# Patient Record
Sex: FEMALE | Race: BLACK OR AFRICAN AMERICAN | NOT HISPANIC OR LATINO
[De-identification: names, ages, dates, MRNs, and addresses within clinical notes are randomized per-mention and may not be internally consistent; named-entity substitution may affect disease eponyms.]

---

## 2017-01-10 ENCOUNTER — CHARTING TRANS (OUTPATIENT)
Dept: OTHER | Age: 52
End: 2017-01-10

## 2017-01-23 ENCOUNTER — CHARTING TRANS (OUTPATIENT)
Dept: INTERNAL MEDICINE | Age: 52
End: 2017-01-23

## 2017-01-23 ENCOUNTER — CHARTING TRANS (OUTPATIENT)
Dept: OTHER | Age: 52
End: 2017-01-23

## 2017-01-23 ENCOUNTER — IMAGING SERVICES (OUTPATIENT)
Dept: OTHER | Age: 52
End: 2017-01-23

## 2017-01-24 ENCOUNTER — LAB SERVICES (OUTPATIENT)
Dept: OTHER | Age: 52
End: 2017-01-24

## 2017-01-24 LAB
ALBUMIN SERPL BCG-MCNC: 4.2 G/DL (ref 3.6–5.1)
ALP SERPL-CCNC: 64 U/L (ref 45–105)
ALT SERPL W/O P-5'-P-CCNC: 15 U/L (ref 7–34)
AST SERPL-CCNC: 13 U/L (ref 9–37)
BILIRUB SERPL-MCNC: <0.2 MG/DL (ref 0–1)
BUN SERPL-MCNC: 21 MG/DL (ref 7–20)
CALCIUM SERPL-MCNC: 9.8 MG/DL (ref 8.6–10.6)
CHLORIDE SERPL-SCNC: 102 MMOL/L (ref 96–107)
CHOLEST SERPL-MCNC: 198 MG/DL (ref 140–200)
CREATININE, SERUM: 1.1 MG/DL (ref 0.5–1.4)
DIFFERENTIAL TYPE: ABNORMAL
GFR SERPL CREATININE-BSD FRML MDRD: 52 ML/MIN/{1.73M2}
GFR SERPL CREATININE-BSD FRML MDRD: >60 ML/MIN/{1.73M2}
GLUCOSE P FAST SERPL-MCNC: 102 MG/DL (ref 60–100)
HCO3 SERPL-SCNC: 23 MMOL/L (ref 22–32)
HDLC SERPL-MCNC: 77 MG/DL
HEMATOCRIT: 34.1 % (ref 34–45)
HEMOGLOBIN: 11.5 G/DL (ref 11.2–15.7)
LDLC SERPL CALC-MCNC: 110 MG/DL (ref 0–100)
LYMPH PERCENT: 26.9 % (ref 20.5–51.1)
LYMPHOCYTE ABSOLUTE #: 2.8 10*3/UL (ref 1.2–3.4)
MEAN CORPUSCULAR HGB CONCENTRATION: 33.7 % (ref 32–36)
MEAN CORPUSCULAR HGB: 29.7 PG (ref 27–34)
MEAN CORPUSCULAR VOLUME: 88.1 FL (ref 79–95)
MEAN PLATELET VOLUME: 7.8 FL (ref 8.6–12.4)
MIXED %: 7.4 % (ref 4.3–12.9)
MIXED ABSOLUTE #: 0.8 10*3/UL (ref 0.2–0.9)
NEUTROPHIL ABSOLUTE #: 6.8 10*3/UL (ref 1.4–6.5)
NEUTROPHIL PERCENT: 65.7 % (ref 34–73.5)
PLATELET COUNT: 426 10*3/UL (ref 150–400)
POTASSIUM SERPL-SCNC: 4.4 MMOL/L (ref 3.5–5.3)
PROT SERPL-MCNC: 6.8 G/DL (ref 6.2–8.1)
RED BLOOD CELL COUNT: 3.87 10*6/UL (ref 3.7–5.2)
RED CELL DISTRIBUTION WIDTH: 14 % (ref 11.3–14.8)
SODIUM SERPL-SCNC: 137 MMOL/L (ref 136–146)
TRIGL SERPL-MCNC: 54 MG/DL (ref 0–200)
TSH SERPL DL<=0.05 MIU/L-ACNC: 2.64 M[IU]/L (ref 0.3–4.82)
WHITE BLOOD CELL COUNT: 10.4 10*3/UL (ref 4–10)

## 2017-01-25 LAB
EST. AVERAGE GLUCOSE BLD GHB EST-MCNC: 126 MG/DL (ref 0–154)
HBA1C MFR BLD: 6 % (ref 4.2–6)

## 2017-01-26 ENCOUNTER — CHARTING TRANS (OUTPATIENT)
Dept: OTHER | Age: 52
End: 2017-01-26

## 2017-01-27 ENCOUNTER — CHARTING TRANS (OUTPATIENT)
Dept: OTHER | Age: 52
End: 2017-01-27

## 2017-01-27 ENCOUNTER — IMAGING SERVICES (OUTPATIENT)
Dept: OTHER | Age: 52
End: 2017-01-27

## 2017-02-03 ENCOUNTER — CHARTING TRANS (OUTPATIENT)
Dept: OTHER | Age: 52
End: 2017-02-03

## 2017-02-18 ENCOUNTER — IMAGING SERVICES (OUTPATIENT)
Dept: OTHER | Age: 52
End: 2017-02-18

## 2017-02-20 ENCOUNTER — IMAGING SERVICES (OUTPATIENT)
Dept: OTHER | Age: 52
End: 2017-02-20

## 2017-02-24 ENCOUNTER — IMAGING SERVICES (OUTPATIENT)
Dept: OTHER | Age: 52
End: 2017-02-24

## 2017-02-24 ENCOUNTER — CHARTING TRANS (OUTPATIENT)
Dept: OTHER | Age: 52
End: 2017-02-24

## 2017-03-20 ENCOUNTER — CHARTING TRANS (OUTPATIENT)
Dept: GASTROENTEROLOGY | Age: 52
End: 2017-03-20

## 2017-04-02 ENCOUNTER — CHARTING TRANS (OUTPATIENT)
Dept: URGENT CARE | Age: 52
End: 2017-04-02

## 2017-04-02 ASSESSMENT — PAIN SCALES - GENERAL: PAINLEVEL_OUTOF10: 6

## 2017-04-03 ENCOUNTER — CHARTING TRANS (OUTPATIENT)
Dept: OTHER | Age: 52
End: 2017-04-03

## 2017-04-10 ENCOUNTER — CHARTING TRANS (OUTPATIENT)
Dept: OTHER | Age: 52
End: 2017-04-10

## 2017-05-01 ENCOUNTER — CHARTING TRANS (OUTPATIENT)
Dept: OTHER | Age: 52
End: 2017-05-01

## 2017-05-01 ENCOUNTER — LAB SERVICES (OUTPATIENT)
Dept: OTHER | Age: 52
End: 2017-05-01

## 2017-05-02 LAB — PREGNANCY TEST, URINE: NEGATIVE

## 2017-05-03 ENCOUNTER — CHARTING TRANS (OUTPATIENT)
Dept: OTHER | Age: 52
End: 2017-05-03

## 2017-05-03 LAB — AP REPORT: NORMAL

## 2017-06-26 ENCOUNTER — CHARTING TRANS (OUTPATIENT)
Dept: OTHER | Age: 52
End: 2017-06-26

## 2017-06-28 ENCOUNTER — IMAGING SERVICES (OUTPATIENT)
Dept: OTHER | Age: 52
End: 2017-06-28

## 2017-06-28 ENCOUNTER — CHARTING TRANS (OUTPATIENT)
Dept: ORTHOPEDICS | Age: 52
End: 2017-06-28

## 2017-07-03 ENCOUNTER — CHARTING TRANS (OUTPATIENT)
Dept: OTHER | Age: 52
End: 2017-07-03

## 2017-07-05 ENCOUNTER — CHARTING TRANS (OUTPATIENT)
Dept: OTHER | Age: 52
End: 2017-07-05

## 2017-07-12 ENCOUNTER — MYAURORA ACCOUNT LINK (OUTPATIENT)
Dept: OTHER | Age: 52
End: 2017-07-12

## 2017-07-12 ENCOUNTER — CHARTING TRANS (OUTPATIENT)
Dept: GASTROENTEROLOGY | Age: 52
End: 2017-07-12

## 2017-07-17 ENCOUNTER — CHARTING TRANS (OUTPATIENT)
Dept: INTERNAL MEDICINE | Age: 52
End: 2017-07-17

## 2017-07-20 ENCOUNTER — CHARTING TRANS (OUTPATIENT)
Dept: OTHER | Age: 52
End: 2017-07-20

## 2017-08-25 ENCOUNTER — IMAGING SERVICES (OUTPATIENT)
Dept: OTHER | Age: 52
End: 2017-08-25

## 2017-09-15 ENCOUNTER — CHARTING TRANS (OUTPATIENT)
Dept: OTHER | Age: 52
End: 2017-09-15

## 2017-10-03 ENCOUNTER — CHARTING TRANS (OUTPATIENT)
Dept: OTHER | Age: 52
End: 2017-10-03

## 2017-11-08 ENCOUNTER — CHARTING TRANS (OUTPATIENT)
Dept: OTHER | Age: 52
End: 2017-11-08

## 2017-12-14 ENCOUNTER — CHARTING TRANS (OUTPATIENT)
Dept: OTHER | Age: 52
End: 2017-12-14

## 2017-12-19 ENCOUNTER — CHARTING TRANS (OUTPATIENT)
Dept: OTHER | Age: 52
End: 2017-12-19

## 2018-01-03 ENCOUNTER — CHARTING TRANS (OUTPATIENT)
Dept: ORTHOPEDICS | Age: 53
End: 2018-01-03

## 2018-01-11 ENCOUNTER — LAB SERVICES (OUTPATIENT)
Dept: OTHER | Age: 53
End: 2018-01-11

## 2018-01-11 LAB
ALBUMIN SERPL BCG-MCNC: 4.3 G/DL (ref 3.6–5.1)
ALP SERPL-CCNC: 61 U/L (ref 45–105)
ALT SERPL W/O P-5'-P-CCNC: 12 U/L (ref 7–34)
AST SERPL-CCNC: 14 U/L (ref 9–37)
BILIRUB SERPL-MCNC: 0.3 MG/DL (ref 0–1)
BUN SERPL-MCNC: 20 MG/DL (ref 7–20)
CALCIUM SERPL-MCNC: 9.7 MG/DL (ref 8.6–10.6)
CHLORIDE SERPL-SCNC: 102 MMOL/L (ref 96–107)
CHOLEST SERPL-MCNC: 240 MG/DL (ref 140–200)
CREATININE, SERUM: 1 MG/DL (ref 0.5–1.4)
DIFFERENTIAL TYPE: ABNORMAL
EST. AVERAGE GLUCOSE BLD GHB EST-MCNC: 120 MG/DL (ref 0–154)
GFR SERPL CREATININE-BSD FRML MDRD: 59 ML/MIN/{1.73M2}
GFR SERPL CREATININE-BSD FRML MDRD: >60 ML/MIN/{1.73M2}
GLUCOSE P FAST SERPL-MCNC: 87 MG/DL (ref 60–100)
HBA1C MFR BLD: 5.8 % (ref 4.2–6)
HCO3 SERPL-SCNC: 23 MMOL/L (ref 22–32)
HDLC SERPL-MCNC: 88 MG/DL
HEMATOCRIT: 37.8 % (ref 34–45)
HEMOGLOBIN: 12.5 G/DL (ref 11.2–15.7)
LDLC SERPL CALC-MCNC: 144 MG/DL (ref 0–100)
LYMPH PERCENT: 30.7 % (ref 20.5–51.1)
LYMPHOCYTE ABSOLUTE #: 3.2 10*3/UL (ref 1.2–3.4)
MEAN CORPUSCULAR HGB CONCENTRATION: 33.1 % (ref 32–36)
MEAN CORPUSCULAR HGB: 29.7 PG (ref 27–34)
MEAN CORPUSCULAR VOLUME: 89.8 FL (ref 79–95)
MEAN PLATELET VOLUME: 8 FL (ref 8.6–12.4)
MIXED %: 7 % (ref 4.3–12.9)
MIXED ABSOLUTE #: 0.7 10*3/UL (ref 0.2–0.9)
NEUTROPHIL ABSOLUTE #: 6.5 10*3/UL (ref 1.4–6.5)
NEUTROPHIL PERCENT: 62.3 % (ref 34–73.5)
PLATELET COUNT: 445 10*3/UL (ref 150–400)
POTASSIUM SERPL-SCNC: 4.7 MMOL/L (ref 3.5–5.3)
PROT SERPL-MCNC: 7 G/DL (ref 6.2–8.1)
RED BLOOD CELL COUNT: 4.21 10*6/UL (ref 3.7–5.2)
RED CELL DISTRIBUTION WIDTH: 14 % (ref 11.3–14.8)
SODIUM SERPL-SCNC: 137 MMOL/L (ref 136–146)
TRIGL SERPL-MCNC: 43 MG/DL (ref 0–200)
TSH SERPL DL<=0.05 MIU/L-ACNC: 1.85 M[IU]/L (ref 0.3–4.82)
WHITE BLOOD CELL COUNT: 10.4 10*3/UL (ref 4–10)

## 2018-01-23 ENCOUNTER — CHARTING TRANS (OUTPATIENT)
Dept: OTHER | Age: 53
End: 2018-01-23

## 2018-01-23 ENCOUNTER — LAB SERVICES (OUTPATIENT)
Dept: OTHER | Age: 53
End: 2018-01-23

## 2018-01-30 LAB — AP REPORT: NORMAL

## 2018-02-15 ENCOUNTER — CHARTING TRANS (OUTPATIENT)
Dept: OTHER | Age: 53
End: 2018-02-15

## 2018-02-23 ENCOUNTER — IMAGING SERVICES (OUTPATIENT)
Dept: OTHER | Age: 53
End: 2018-02-23

## 2018-03-12 ENCOUNTER — CHARTING TRANS (OUTPATIENT)
Dept: OTHER | Age: 53
End: 2018-03-12

## 2018-03-12 ENCOUNTER — MYAURORA ACCOUNT LINK (OUTPATIENT)
Dept: OTHER | Age: 53
End: 2018-03-12

## 2018-03-12 ASSESSMENT — PAIN SCALES - GENERAL: PAINLEVEL_OUTOF10: 7

## 2018-03-16 ENCOUNTER — MYAURORA ACCOUNT LINK (OUTPATIENT)
Dept: OTHER | Age: 53
End: 2018-03-16

## 2018-03-16 ENCOUNTER — CHARTING TRANS (OUTPATIENT)
Dept: OTHER | Age: 53
End: 2018-03-16

## 2018-03-19 ENCOUNTER — CHARTING TRANS (OUTPATIENT)
Dept: OTHER | Age: 53
End: 2018-03-19

## 2018-03-27 ENCOUNTER — CHARTING TRANS (OUTPATIENT)
Dept: OTHER | Age: 53
End: 2018-03-27

## 2018-03-27 ENCOUNTER — MYAURORA ACCOUNT LINK (OUTPATIENT)
Dept: OTHER | Age: 53
End: 2018-03-27

## 2018-03-27 ASSESSMENT — PAIN SCALES - GENERAL: PAINLEVEL_OUTOF10: 5

## 2018-03-28 ENCOUNTER — CHARTING TRANS (OUTPATIENT)
Dept: OTHER | Age: 53
End: 2018-03-28

## 2018-04-26 ENCOUNTER — CHARTING TRANS (OUTPATIENT)
Dept: OTHER | Age: 53
End: 2018-04-26

## 2018-06-13 ENCOUNTER — CHARTING TRANS (OUTPATIENT)
Dept: OTHER | Age: 53
End: 2018-06-13

## 2018-06-27 ENCOUNTER — CHARTING TRANS (OUTPATIENT)
Dept: OTHER | Age: 53
End: 2018-06-27

## 2018-07-30 ENCOUNTER — MYAURORA ACCOUNT LINK (OUTPATIENT)
Dept: OTHER | Age: 53
End: 2018-07-30

## 2018-08-20 ENCOUNTER — CHARTING TRANS (OUTPATIENT)
Dept: OTHER | Age: 53
End: 2018-08-20

## 2018-09-04 ENCOUNTER — MYAURORA ACCOUNT LINK (OUTPATIENT)
Dept: OTHER | Age: 53
End: 2018-09-04

## 2018-09-04 ENCOUNTER — IMAGING SERVICES (OUTPATIENT)
Dept: OTHER | Age: 53
End: 2018-09-04

## 2018-10-01 ENCOUNTER — CHARTING TRANS (OUTPATIENT)
Dept: OTHER | Age: 53
End: 2018-10-01

## 2018-10-04 ENCOUNTER — LAB SERVICES (OUTPATIENT)
Dept: OTHER | Age: 53
End: 2018-10-04

## 2018-10-04 ENCOUNTER — ANCILLARY ORDERS (OUTPATIENT)
Dept: OTHER | Age: 53
End: 2018-10-04

## 2018-10-04 ENCOUNTER — CHARTING TRANS (OUTPATIENT)
Dept: OTHER | Age: 53
End: 2018-10-04

## 2018-10-04 ENCOUNTER — MYAURORA ACCOUNT LINK (OUTPATIENT)
Dept: OTHER | Age: 53
End: 2018-10-04

## 2018-10-04 DIAGNOSIS — D21.9 BENIGN NEOPLASM OF CONNECTIVE AND OTHER SOFT TISSUE, UNSPECIFIED: ICD-10-CM

## 2018-10-04 LAB
BASOPHIL %: 0.4 % (ref 0–1.2)
BASOPHIL ABSOLUTE #: 0 10*3/UL (ref 0–0.1)
DIFFERENTIAL TYPE: ABNORMAL
EOSINOPHIL %: 2.9 % (ref 0–10)
EOSINOPHIL ABSOLUTE #: 0.3 10*3/UL (ref 0–0.5)
HEMATOCRIT: 38.9 % (ref 34–45)
HEMOGLOBIN: 12.9 G/DL (ref 11.2–15.7)
LYMPH PERCENT: 29.8 % (ref 20.5–51.1)
LYMPHOCYTE ABSOLUTE #: 2.7 10*3/UL (ref 1.2–3.4)
MEAN CORPUSCULAR HGB CONCENTRATION: 33.2 % (ref 32–36)
MEAN CORPUSCULAR HGB: 30.1 PG (ref 27–34)
MEAN CORPUSCULAR VOLUME: 90.7 FL (ref 79–95)
MEAN PLATELET VOLUME: 8.8 FL (ref 8.6–12.4)
MONOCYTE ABSOLUTE #: 0.7 10*3/UL (ref 0.2–0.9)
MONOCYTE PERCENT: 7.5 % (ref 4.3–12.9)
NEUTROPHIL ABSOLUTE #: 5.4 10*3/UL (ref 1.4–6.5)
NEUTROPHIL PERCENT: 59.4 % (ref 34–73.5)
PLATELET COUNT: 441 10*3/UL (ref 150–400)
RED BLOOD CELL COUNT: 4.29 10*6/UL (ref 3.7–5.2)
RED CELL DISTRIBUTION WIDTH: 13 % (ref 11.3–14.8)
WHITE BLOOD CELL COUNT: 9.2 10*3/UL (ref 4–10)

## 2018-10-05 LAB — FSH SERPL-ACNC: 7.82 M[IU]/ML (ref 2–25)

## 2018-10-06 ENCOUNTER — CHARTING TRANS (OUTPATIENT)
Dept: OTHER | Age: 53
End: 2018-10-06

## 2018-11-02 ENCOUNTER — CHARTING TRANS (OUTPATIENT)
Dept: OTHER | Age: 53
End: 2018-11-02

## 2018-11-12 ENCOUNTER — CHARTING TRANS (OUTPATIENT)
Dept: OTHER | Age: 53
End: 2018-11-12

## 2018-11-13 ENCOUNTER — CHARTING TRANS (OUTPATIENT)
Dept: OTHER | Age: 53
End: 2018-11-13

## 2018-11-17 ENCOUNTER — CHARTING TRANS (OUTPATIENT)
Dept: OTHER | Age: 53
End: 2018-11-17

## 2018-11-27 VITALS — WEIGHT: 280 LBS | BODY MASS INDEX: 45 KG/M2 | HEIGHT: 66 IN

## 2018-11-28 VITALS
DIASTOLIC BLOOD PRESSURE: 80 MMHG | RESPIRATION RATE: 22 BRPM | OXYGEN SATURATION: 97 % | TEMPERATURE: 97.3 F | SYSTOLIC BLOOD PRESSURE: 120 MMHG | HEART RATE: 57 BPM

## 2018-11-28 VITALS
DIASTOLIC BLOOD PRESSURE: 66 MMHG | WEIGHT: 293 LBS | BODY MASS INDEX: 47.09 KG/M2 | HEART RATE: 64 BPM | HEIGHT: 66 IN | SYSTOLIC BLOOD PRESSURE: 106 MMHG

## 2018-11-28 VITALS — WEIGHT: 293 LBS | BODY MASS INDEX: 47.09 KG/M2 | HEIGHT: 66 IN

## 2018-11-28 VITALS
BODY MASS INDEX: 46.93 KG/M2 | HEIGHT: 66 IN | TEMPERATURE: 97.3 F | SYSTOLIC BLOOD PRESSURE: 112 MMHG | RESPIRATION RATE: 16 BRPM | HEART RATE: 84 BPM | DIASTOLIC BLOOD PRESSURE: 62 MMHG | WEIGHT: 292 LBS

## 2018-11-29 VITALS
BODY MASS INDEX: 45.99 KG/M2 | HEIGHT: 67 IN | HEART RATE: 99 BPM | SYSTOLIC BLOOD PRESSURE: 138 MMHG | TEMPERATURE: 97 F | DIASTOLIC BLOOD PRESSURE: 86 MMHG | WEIGHT: 293 LBS | OXYGEN SATURATION: 92 %

## 2018-11-29 VITALS
HEIGHT: 67 IN | RESPIRATION RATE: 14 BRPM | WEIGHT: 293 LBS | DIASTOLIC BLOOD PRESSURE: 92 MMHG | BODY MASS INDEX: 45.99 KG/M2 | HEART RATE: 60 BPM | SYSTOLIC BLOOD PRESSURE: 116 MMHG

## 2018-12-10 ENCOUNTER — OFFICE VISIT (OUTPATIENT)
Dept: ORTHOPEDICS | Age: 53
End: 2018-12-10

## 2018-12-10 VITALS — WEIGHT: 270 LBS | HEIGHT: 66 IN | BODY MASS INDEX: 43.39 KG/M2

## 2018-12-10 DIAGNOSIS — M17.11 PRIMARY OSTEOARTHRITIS OF RIGHT KNEE: Primary | ICD-10-CM

## 2018-12-10 PROCEDURE — 20610 DRAIN/INJ JOINT/BURSA W/O US: CPT

## 2018-12-10 RX ORDER — CHLORAL HYDRATE 500 MG
CAPSULE ORAL
COMMUNITY
End: 2022-09-07 | Stop reason: ALTCHOICE

## 2018-12-10 RX ORDER — METHYLPREDNISOLONE ACETATE 40 MG/ML
40 INJECTION, SUSPENSION INTRA-ARTICULAR; INTRALESIONAL; INTRAMUSCULAR; SOFT TISSUE
Status: COMPLETED | OUTPATIENT
Start: 2018-12-10 | End: 2018-12-10

## 2018-12-10 RX ORDER — ALBUTEROL SULFATE 90 UG/1
2 AEROSOL, METERED RESPIRATORY (INHALATION)
COMMUNITY
Start: 2016-03-08 | End: 2019-10-25 | Stop reason: ALTCHOICE

## 2018-12-10 RX ORDER — LISINOPRIL 20 MG/1
TABLET ORAL
COMMUNITY
Start: 2018-11-12 | End: 2019-11-10 | Stop reason: SDUPTHER

## 2018-12-10 RX ORDER — FLUTICASONE PROPIONATE 50 MCG
1 SPRAY, SUSPENSION (ML) NASAL
COMMUNITY
Start: 2016-03-08 | End: 2019-10-25 | Stop reason: ALTCHOICE

## 2018-12-10 RX ORDER — BUPROPION HYDROCHLORIDE 150 MG/1
150 TABLET ORAL
COMMUNITY
Start: 2018-09-04 | End: 2019-02-19 | Stop reason: SDUPTHER

## 2018-12-10 RX ORDER — NABUMETONE 500 MG/1
TABLET, FILM COATED ORAL
COMMUNITY
Start: 2018-09-14 | End: 2019-03-12 | Stop reason: SDUPTHER

## 2018-12-10 RX ORDER — BUPROPION HYDROCHLORIDE 300 MG/1
300 TABLET ORAL
COMMUNITY
Start: 2018-09-04 | End: 2019-02-19 | Stop reason: SDUPTHER

## 2018-12-10 RX ORDER — MONTELUKAST SODIUM 10 MG/1
10 TABLET ORAL
COMMUNITY
Start: 2016-03-08 | End: 2020-04-24 | Stop reason: ALTCHOICE

## 2018-12-10 RX ORDER — BUTALBITAL, ACETAMINOPHEN AND CAFFEINE 50; 325; 40 MG/1; MG/1; MG/1
TABLET ORAL
COMMUNITY
Start: 2018-10-06 | End: 2019-03-11 | Stop reason: SDUPTHER

## 2018-12-10 RX ADMIN — METHYLPREDNISOLONE ACETATE 40 MG: 40 INJECTION, SUSPENSION INTRA-ARTICULAR; INTRALESIONAL; INTRAMUSCULAR; SOFT TISSUE at 15:14

## 2018-12-29 RX ORDER — BUTALBITAL, ACETAMINOPHEN AND CAFFEINE 50; 325; 40 MG/1; MG/1; MG/1
TABLET ORAL
Qty: 30 TABLET | Refills: 0 | Status: SHIPPED | OUTPATIENT
Start: 2018-12-29 | End: 2019-01-28 | Stop reason: SDUPTHER

## 2019-01-04 RX ORDER — BUTALBITAL, ACETAMINOPHEN AND CAFFEINE 50; 325; 40 MG/1; MG/1; MG/1
TABLET ORAL
Qty: 30 TABLET | Refills: 0 | OUTPATIENT
Start: 2019-01-04

## 2019-01-21 RX ORDER — PANTOPRAZOLE SODIUM 40 MG/1
TABLET, DELAYED RELEASE ORAL
COMMUNITY
Start: 2018-06-27 | End: 2019-01-28 | Stop reason: SDUPTHER

## 2019-01-28 ENCOUNTER — OFFICE VISIT (OUTPATIENT)
Dept: INTERNAL MEDICINE | Age: 54
End: 2019-01-28

## 2019-01-28 ENCOUNTER — OFFICE VISIT (OUTPATIENT)
Dept: ORTHOPEDICS | Age: 54
End: 2019-01-28

## 2019-01-28 VITALS
DIASTOLIC BLOOD PRESSURE: 68 MMHG | OXYGEN SATURATION: 99 % | BODY MASS INDEX: 42.59 KG/M2 | SYSTOLIC BLOOD PRESSURE: 102 MMHG | HEART RATE: 75 BPM | TEMPERATURE: 96.9 F | WEIGHT: 265 LBS | HEIGHT: 66 IN

## 2019-01-28 DIAGNOSIS — M17.12 PRIMARY OSTEOARTHRITIS OF LEFT KNEE: Primary | ICD-10-CM

## 2019-01-28 DIAGNOSIS — Z00.00 ROUTINE GENERAL MEDICAL EXAMINATION AT HEALTH CARE FACILITY: Primary | ICD-10-CM

## 2019-01-28 DIAGNOSIS — Z12.31 ENCOUNTER FOR SCREENING MAMMOGRAM FOR MALIGNANT NEOPLASM OF BREAST: ICD-10-CM

## 2019-01-28 PROCEDURE — 3074F SYST BP LT 130 MM HG: CPT | Performed by: INTERNAL MEDICINE

## 2019-01-28 PROCEDURE — 80061 LIPID PANEL: CPT | Performed by: INTERNAL MEDICINE

## 2019-01-28 PROCEDURE — 80050 GENERAL HEALTH PANEL: CPT | Performed by: INTERNAL MEDICINE

## 2019-01-28 PROCEDURE — 20610 DRAIN/INJ JOINT/BURSA W/O US: CPT | Performed by: PHYSICIAN ASSISTANT

## 2019-01-28 PROCEDURE — 99396 PREV VISIT EST AGE 40-64: CPT | Performed by: INTERNAL MEDICINE

## 2019-01-28 PROCEDURE — 3078F DIAST BP <80 MM HG: CPT | Performed by: INTERNAL MEDICINE

## 2019-01-28 RX ORDER — PANTOPRAZOLE SODIUM 40 MG/1
40 TABLET, DELAYED RELEASE ORAL DAILY
Qty: 90 TABLET | Refills: 0 | Status: SHIPPED | OUTPATIENT
Start: 2019-01-28 | End: 2019-04-26 | Stop reason: SDUPTHER

## 2019-01-28 RX ORDER — PHENDIMETRAZINE TARTRATE 35 MG/1
TABLET ORAL
Refills: 0 | COMMUNITY
Start: 2019-01-17 | End: 2019-10-25 | Stop reason: ALTCHOICE

## 2019-01-28 RX ORDER — METHYLPREDNISOLONE ACETATE 40 MG/ML
80 INJECTION, SUSPENSION INTRA-ARTICULAR; INTRALESIONAL; INTRAMUSCULAR; SOFT TISSUE
Status: COMPLETED | OUTPATIENT
Start: 2019-01-28 | End: 2019-01-28

## 2019-01-28 RX ADMIN — METHYLPREDNISOLONE ACETATE 80 MG: 40 INJECTION, SUSPENSION INTRA-ARTICULAR; INTRALESIONAL; INTRAMUSCULAR; SOFT TISSUE at 14:05

## 2019-01-28 SDOH — HEALTH STABILITY: PHYSICAL HEALTH: ON AVERAGE, HOW MANY MINUTES DO YOU ENGAGE IN EXERCISE AT THIS LEVEL?: 30 MIN

## 2019-01-28 SDOH — HEALTH STABILITY: MENTAL HEALTH
STRESS IS WHEN SOMEONE FEELS TENSE, NERVOUS, ANXIOUS, OR CAN'T SLEEP AT NIGHT BECAUSE THEIR MIND IS TROUBLED. HOW STRESSED ARE YOU?: NOT AT ALL

## 2019-01-28 SDOH — HEALTH STABILITY: PHYSICAL HEALTH: ON AVERAGE, HOW MANY DAYS PER WEEK DO YOU ENGAGE IN MODERATE TO STRENUOUS EXERCISE (LIKE A BRISK WALK)?: 5 DAYS

## 2019-01-28 ASSESSMENT — PATIENT HEALTH QUESTIONNAIRE - PHQ9
2. FEELING DOWN, DEPRESSED OR HOPELESS: NOT AT ALL
1. LITTLE INTEREST OR PLEASURE IN DOING THINGS: NOT AT ALL
SUM OF ALL RESPONSES TO PHQ9 QUESTIONS 1 AND 2: 0

## 2019-01-29 ENCOUNTER — LAB SERVICES (OUTPATIENT)
Dept: LAB | Age: 54
End: 2019-01-29

## 2019-01-29 DIAGNOSIS — Z00.00 ROUTINE GENERAL MEDICAL EXAMINATION AT HEALTH CARE FACILITY: ICD-10-CM

## 2019-01-29 LAB
ALBUMIN SERPL BCG-MCNC: 4.4 G/DL (ref 3.6–5.1)
ALP SERPL-CCNC: 65 U/L (ref 45–130)
ALT SERPL W/O P-5'-P-CCNC: 20 U/L (ref 7–34)
AST SERPL-CCNC: 21 U/L (ref 9–37)
BILIRUB SERPL-MCNC: 0.2 MG/DL (ref 0–1)
BUN SERPL-MCNC: 23 MG/DL (ref 7–20)
CALCIUM SERPL-MCNC: 10 MG/DL (ref 8.6–10.6)
CHLORIDE SERPL-SCNC: 104 MMOL/L (ref 96–107)
CHOLEST SERPL-MCNC: 222 MG/DL (ref 140–200)
CREAT SERPL-MCNC: 1.1 MG/DL (ref 0.5–1.4)
DIFFERENTIAL TYPE: ABNORMAL
GFR SERPL CREATININE-BSD FRML MDRD: 54 ML/MIN/{1.73M2}
GFR SERPL CREATININE-BSD FRML MDRD: >60 ML/MIN/{1.73M2}
GLUCOSE P FAST SERPL-MCNC: 85 MG/DL (ref 60–100)
HCO3 SERPL-SCNC: 23 MMOL/L (ref 22–32)
HDLC SERPL-MCNC: 78 MG/DL
HEMATOCRIT: 40.6 % (ref 34–45)
HEMOGLOBIN: 13.3 G/DL (ref 11.2–15.7)
LDLC SERPL CALC-MCNC: 135 MG/DL (ref 0–100)
LYMPH PERCENT: 30.2 % (ref 20.5–51.1)
LYMPHOCYTE ABSOLUTE #: 2.5 10*3/UL (ref 1.2–3.4)
MEAN CORPUSCULAR HGB CONCENTRATION: 32.8 % (ref 32–36)
MEAN CORPUSCULAR HGB: 30.4 PG (ref 27–34)
MEAN CORPUSCULAR VOLUME: 92.7 FL (ref 79–95)
MEAN PLATELET VOLUME: 8.4 FL (ref 8.6–12.4)
MIXED %: 5.8 % (ref 4.3–12.9)
MIXED ABSOLUTE #: 0.5 10*3/UL (ref 0.2–0.9)
NEUTROPHIL ABSOLUTE #: 5.4 10*3/UL (ref 1.4–6.5)
NEUTROPHIL PERCENT: 64 % (ref 34–73.5)
PLATELET COUNT: 389 10*3/UL (ref 150–400)
POTASSIUM SERPL-SCNC: 4.7 MMOL/L (ref 3.5–5.3)
PROT SERPL-MCNC: 7.3 G/DL (ref 6.2–8.1)
RED BLOOD CELL COUNT: 4.38 10*6/UL (ref 3.7–5.2)
RED CELL DISTRIBUTION WIDTH: 14 % (ref 11.3–14.8)
SODIUM SERPL-SCNC: 138 MMOL/L (ref 136–146)
TRIGL SERPL-MCNC: 44 MG/DL (ref 0–200)
TSH SERPL DL<=0.05 MIU/L-ACNC: 2.08 M[IU]/L (ref 0.3–4.82)
WHITE BLOOD CELL COUNT: 8.4 10*3/UL (ref 4–10)

## 2019-01-29 PROCEDURE — 80050 GENERAL HEALTH PANEL: CPT | Performed by: INTERNAL MEDICINE

## 2019-01-29 PROCEDURE — 36415 COLL VENOUS BLD VENIPUNCTURE: CPT | Performed by: INTERNAL MEDICINE

## 2019-01-29 PROCEDURE — 80061 LIPID PANEL: CPT | Performed by: INTERNAL MEDICINE

## 2019-02-20 RX ORDER — BUPROPION HYDROCHLORIDE 300 MG/1
TABLET ORAL
Qty: 90 TABLET | Refills: 1 | Status: SHIPPED | OUTPATIENT
Start: 2019-02-20 | End: 2019-08-21 | Stop reason: SDUPTHER

## 2019-02-20 RX ORDER — NABUMETONE 500 MG/1
TABLET, FILM COATED ORAL
Qty: 30 TABLET | Refills: 2 | OUTPATIENT
Start: 2019-02-20

## 2019-02-20 RX ORDER — BUPROPION HYDROCHLORIDE 150 MG/1
TABLET ORAL
Qty: 90 TABLET | Refills: 1 | Status: SHIPPED | OUTPATIENT
Start: 2019-02-20 | End: 2019-08-21 | Stop reason: SDUPTHER

## 2019-02-25 ENCOUNTER — TELEPHONE (OUTPATIENT)
Dept: SCHEDULING | Age: 54
End: 2019-02-25

## 2019-03-05 VITALS
SYSTOLIC BLOOD PRESSURE: 118 MMHG | DIASTOLIC BLOOD PRESSURE: 78 MMHG | WEIGHT: 292 LBS | HEIGHT: 67 IN | BODY MASS INDEX: 45.83 KG/M2

## 2019-03-06 VITALS
TEMPERATURE: 98.5 F | HEART RATE: 74 BPM | SYSTOLIC BLOOD PRESSURE: 122 MMHG | RESPIRATION RATE: 16 BRPM | OXYGEN SATURATION: 96 % | DIASTOLIC BLOOD PRESSURE: 84 MMHG

## 2019-03-06 VITALS
HEIGHT: 66 IN | DIASTOLIC BLOOD PRESSURE: 74 MMHG | SYSTOLIC BLOOD PRESSURE: 118 MMHG | TEMPERATURE: 98.7 F | WEIGHT: 274 LBS | BODY MASS INDEX: 44.03 KG/M2 | HEART RATE: 68 BPM

## 2019-03-06 VITALS
HEART RATE: 66 BPM | TEMPERATURE: 97 F | RESPIRATION RATE: 18 BRPM | OXYGEN SATURATION: 99 % | SYSTOLIC BLOOD PRESSURE: 136 MMHG | DIASTOLIC BLOOD PRESSURE: 90 MMHG

## 2019-03-11 RX ORDER — BUTALBITAL, ACETAMINOPHEN AND CAFFEINE 50; 325; 40 MG/1; MG/1; MG/1
TABLET ORAL
Qty: 30 TABLET | Refills: 0 | Status: SHIPPED | OUTPATIENT
Start: 2019-03-11 | End: 2019-05-11 | Stop reason: SDUPTHER

## 2019-03-11 RX ORDER — NABUMETONE 500 MG/1
TABLET, FILM COATED ORAL
Qty: 30 TABLET | Refills: 2 | OUTPATIENT
Start: 2019-03-11

## 2019-03-12 RX ORDER — NABUMETONE 500 MG/1
TABLET, FILM COATED ORAL
Qty: 30 TABLET | Refills: 0 | Status: SHIPPED | OUTPATIENT
Start: 2019-03-12 | End: 2019-10-25 | Stop reason: ALTCHOICE

## 2019-04-11 ENCOUNTER — TELEPHONE (OUTPATIENT)
Dept: ORTHOPEDICS | Age: 54
End: 2019-04-11

## 2019-05-01 RX ORDER — PANTOPRAZOLE SODIUM 40 MG/1
TABLET, DELAYED RELEASE ORAL
Qty: 90 TABLET | Refills: 1 | Status: SHIPPED | OUTPATIENT
Start: 2019-05-01 | End: 2019-11-20 | Stop reason: SDUPTHER

## 2019-05-14 RX ORDER — BUTALBITAL, ACETAMINOPHEN AND CAFFEINE 50; 325; 40 MG/1; MG/1; MG/1
TABLET ORAL
Qty: 30 TABLET | Refills: 0 | Status: SHIPPED | OUTPATIENT
Start: 2019-05-14 | End: 2019-08-19 | Stop reason: SDUPTHER

## 2019-05-20 NOTE — PROGRESS NOTES
HISTORY OF PRESENT ILLNESS  Patient presents with:  Weight Problem: referred by Dr Matt Carrizales  is currently taking phendimetrizine 35mg bid throught medi weight loss, Dr Lenise [facility]      Kirstin Osborn is a 47year old f negative  Migraines: +with aura, 1-3x/month  Seizures: negative  Joint-related conditions: OA bilateral knees  Liver disease: negative  Renal disease: negative  Diabetes: negative  Thyroid disease: small thyroid nodules  Constipation: negative  Other perti HGB, HCT, MCV, MCH, MCHC, RDW, PLT, MPV  No results found for: GLU, BUN, BUNCREA, CREATSERUM, ANIONGAP, GFR, GFRNAA, GFRAA, CA, OSMOCALC, ALKPHO, AST, ALT, ALKPHOS, BILT, TP, ALB, GLOBULT, GLOBULIN, AGRATIO, ALBGLOBRAT, NA, K, CL, CO2  No results found for LEPTIN, SERUM; Future  -     VITAMIN B12; Future  -     VITAMIN D, 25-HYDROXY; Future    Morbid obesity with BMI of 40.0-44.9, adult (HCC)  - no medication changes at this time, pt going to consider options.  If decides to [facility] with [facility]...your Lifestyle Renovation begins now! Thank you for placing your trust in our health care team, I look forward to working with you along this journey to better health!     Next steps:     1.  Schedule a personal nutrition consultation with one of the start to mindful eating! Continue or start exercising to help establish a routine. If not already exercising begin with 1 day and progress as able with long-term goal of 30 minutes 5 days a week at a minimum.     Meditation daily can help manage and

## 2019-05-21 ENCOUNTER — OFFICE VISIT (OUTPATIENT)
Dept: INTERNAL MEDICINE CLINIC | Facility: CLINIC | Age: 54
End: 2019-05-21
Payer: COMMERCIAL

## 2019-05-21 VITALS
RESPIRATION RATE: 14 BRPM | HEIGHT: 65.5 IN | SYSTOLIC BLOOD PRESSURE: 118 MMHG | DIASTOLIC BLOOD PRESSURE: 74 MMHG | HEART RATE: 76 BPM | BODY MASS INDEX: 43.95 KG/M2 | WEIGHT: 267 LBS

## 2019-05-21 DIAGNOSIS — M17.0 OSTEOARTHRITIS OF BOTH KNEES, UNSPECIFIED OSTEOARTHRITIS TYPE: ICD-10-CM

## 2019-05-21 DIAGNOSIS — Z51.81 ENCOUNTER FOR THERAPEUTIC DRUG MONITORING: Primary | ICD-10-CM

## 2019-05-21 DIAGNOSIS — E66.01 MORBID OBESITY WITH BMI OF 40.0-44.9, ADULT (HCC): ICD-10-CM

## 2019-05-21 DIAGNOSIS — I10 ESSENTIAL HYPERTENSION: ICD-10-CM

## 2019-05-21 DIAGNOSIS — G43.909 MIGRAINE WITHOUT STATUS MIGRAINOSUS, NOT INTRACTABLE, UNSPECIFIED MIGRAINE TYPE: ICD-10-CM

## 2019-05-21 DIAGNOSIS — K21.00 GASTROESOPHAGEAL REFLUX DISEASE WITH ESOPHAGITIS: ICD-10-CM

## 2019-05-21 PROCEDURE — 99204 OFFICE O/P NEW MOD 45 MIN: CPT | Performed by: NURSE PRACTITIONER

## 2019-05-21 RX ORDER — CHLORAL HYDRATE 500 MG
1 CAPSULE ORAL DAILY
Status: ON HOLD | COMMUNITY
End: 2021-05-18

## 2019-05-21 RX ORDER — PHENDIMETRAZINE TARTRATE 35 MG/1
1 TABLET ORAL 2 TIMES DAILY
Refills: 0 | COMMUNITY
Start: 2019-05-10 | End: 2019-07-16 | Stop reason: ALTCHOICE

## 2019-05-21 RX ORDER — BUTALBITAL, ACETAMINOPHEN AND CAFFEINE 50; 325; 40 MG/1; MG/1; MG/1
1 TABLET ORAL AS NEEDED
COMMUNITY
Start: 2018-10-06

## 2019-05-21 RX ORDER — LISINOPRIL 20 MG/1
20 TABLET ORAL
Refills: 1 | Status: ON HOLD | COMMUNITY
Start: 2019-05-11 | End: 2021-05-18

## 2019-05-21 RX ORDER — BUPROPION HYDROCHLORIDE 300 MG/1
1 TABLET ORAL DAILY
COMMUNITY
Start: 2018-09-04 | End: 2021-04-20

## 2019-05-21 RX ORDER — BUPROPION HYDROCHLORIDE 150 MG/1
1 TABLET ORAL DAILY
COMMUNITY
Start: 2018-09-04 | End: 2020-02-19

## 2019-05-21 RX ORDER — FLUTICASONE PROPIONATE 50 MCG
1 SPRAY, SUSPENSION (ML) NASAL DAILY
COMMUNITY
Start: 2016-03-08 | End: 2020-02-19

## 2019-05-21 RX ORDER — ALBUTEROL SULFATE 90 UG/1
2 AEROSOL, METERED RESPIRATORY (INHALATION) AS NEEDED
COMMUNITY
Start: 2016-03-08 | End: 2020-02-19

## 2019-05-21 RX ORDER — PANTOPRAZOLE SODIUM 40 MG/1
40 TABLET, DELAYED RELEASE ORAL
Refills: 1 | Status: ON HOLD | COMMUNITY
Start: 2019-05-01 | End: 2021-05-18

## 2019-05-21 RX ORDER — NABUMETONE 500 MG/1
1 TABLET, FILM COATED ORAL 2 TIMES DAILY PRN
Refills: 0 | COMMUNITY
Start: 2019-03-12 | End: 2019-09-16

## 2019-05-21 NOTE — PROGRESS NOTES
Yoni Hampton is covered, but a prior authorization (PA) is needed  Your patient can expect to pay $25.00

## 2019-05-21 NOTE — PATIENT INSTRUCTIONS
Welcome to the [facility]...your Lifestyle Renovation begins now! Thank you for placing your trust in our health care team, I look forward to working with you along this journey to better health!     Next steps:     1.  Sched to remain accountable for your choices- this is the start to mindful eating! Continue or start exercising to help establish a routine.  If not already exercising begin with 1 day and progress as able with long-term goal of 30 minutes 5 days a week at a m

## 2019-06-06 PROBLEM — G89.29 CHRONIC PAIN OF BOTH KNEES: Status: ACTIVE | Noted: 2019-06-06

## 2019-06-06 PROBLEM — M25.561 CHRONIC PAIN OF BOTH KNEES: Status: ACTIVE | Noted: 2019-06-06

## 2019-06-06 PROBLEM — M25.562 CHRONIC PAIN OF BOTH KNEES: Status: ACTIVE | Noted: 2019-06-06

## 2019-06-19 ENCOUNTER — TELEPHONE (OUTPATIENT)
Dept: INTERNAL MEDICINE CLINIC | Facility: CLINIC | Age: 54
End: 2019-06-19

## 2019-06-19 NOTE — TELEPHONE ENCOUNTER
I called pt to schedule appt and pt said at her first initial visit she told chas she wasn't sure about the program or starting any medication but now she is ready to continue and start medication pt was here for a first inital visit in may and would l

## 2019-06-20 NOTE — TELEPHONE ENCOUNTER
I reviewed her consult note. I would like her to start Topamax at 1 tab daily for 7 days, then increase to 1 tab twice a day as prescribed. Send script to pharmacy for #60 tabs with 1 refill.  Have her schedule f/u visit in 3 weeks and bring previous EKG sh

## 2019-07-15 ENCOUNTER — TELEPHONE (OUTPATIENT)
Dept: INTERNAL MEDICINE CLINIC | Facility: CLINIC | Age: 54
End: 2019-07-15

## 2019-07-15 DIAGNOSIS — Z51.81 ENCOUNTER FOR THERAPEUTIC DRUG MONITORING: Primary | ICD-10-CM

## 2019-07-15 DIAGNOSIS — E66.01 MORBID OBESITY WITH BMI OF 40.0-44.9, ADULT (HCC): ICD-10-CM

## 2019-07-16 RX ORDER — PHENTERMINE HYDROCHLORIDE 15 MG/1
15 CAPSULE ORAL EVERY MORNING
Qty: 30 CAPSULE | Refills: 0 | OUTPATIENT
Start: 2019-07-16 | End: 2019-08-19 | Stop reason: DRUGHIGH

## 2019-07-16 RX ORDER — PHENDIMETRAZINE TARTRATE 35 MG/1
1 TABLET ORAL 2 TIMES DAILY
Qty: 30 TABLET | Refills: 0 | Status: CANCELLED | OUTPATIENT
Start: 2019-07-16

## 2019-07-16 NOTE — TELEPHONE ENCOUNTER
Requesting phendimetrizine   LOV: 5/21/19  RTC: 1 month  Filled: 5/10/19 #30 with 0 refills    Future Appointments   Date Time Provider Ole Llanes   7/30/2019 11:30 AM Denver Adams PTA [facility] PT [facility]   8/6/2019 11:30 AM Denver Adams PTA

## 2019-07-16 NOTE — TELEPHONE ENCOUNTER
Please make sure patient stops phendimetrazine (prescribed by outside provider) and then starts phentermine daily in the AM. Continue on Topamax, should have 1 refill left.  Bring previous EKG from last provider to next office visit, if she can not locate w

## 2019-07-22 NOTE — TELEPHONE ENCOUNTER
Patient informed of all. She will stop Phedimetrazine and begin Phentermine as instructed.   She would like called to [facility] (given information about cost/coupons)  Rx called in    Phentermine HCl 15 MG Oral Cap 30 capsule 0 7/16/2019    Sig:   Take 1 capsule

## 2019-08-19 ENCOUNTER — OFFICE VISIT (OUTPATIENT)
Dept: INTERNAL MEDICINE CLINIC | Facility: CLINIC | Age: 54
End: 2019-08-19
Payer: COMMERCIAL

## 2019-08-19 VITALS
RESPIRATION RATE: 14 BRPM | HEART RATE: 78 BPM | DIASTOLIC BLOOD PRESSURE: 74 MMHG | BODY MASS INDEX: 46.42 KG/M2 | HEIGHT: 65.5 IN | SYSTOLIC BLOOD PRESSURE: 110 MMHG | WEIGHT: 282 LBS

## 2019-08-19 DIAGNOSIS — M17.0 OSTEOARTHRITIS OF BOTH KNEES, UNSPECIFIED OSTEOARTHRITIS TYPE: ICD-10-CM

## 2019-08-19 DIAGNOSIS — Z51.81 ENCOUNTER FOR THERAPEUTIC DRUG MONITORING: Primary | ICD-10-CM

## 2019-08-19 DIAGNOSIS — K21.00 GASTROESOPHAGEAL REFLUX DISEASE WITH ESOPHAGITIS: ICD-10-CM

## 2019-08-19 DIAGNOSIS — E66.01 MORBID OBESITY WITH BMI OF 40.0-44.9, ADULT (HCC): ICD-10-CM

## 2019-08-19 DIAGNOSIS — G43.909 MIGRAINE WITHOUT STATUS MIGRAINOSUS, NOT INTRACTABLE, UNSPECIFIED MIGRAINE TYPE: ICD-10-CM

## 2019-08-19 DIAGNOSIS — I10 ESSENTIAL HYPERTENSION: ICD-10-CM

## 2019-08-19 PROCEDURE — 99214 OFFICE O/P EST MOD 30 MIN: CPT | Performed by: NURSE PRACTITIONER

## 2019-08-19 RX ORDER — PHENTERMINE HYDROCHLORIDE 37.5 MG/1
37.5 TABLET ORAL EVERY MORNING
Qty: 30 TABLET | Refills: 0 | Status: SHIPPED | OUTPATIENT
Start: 2019-08-19 | End: 2019-09-16

## 2019-08-19 RX ORDER — PHENTERMINE HYDROCHLORIDE 15 MG/1
15 CAPSULE ORAL EVERY MORNING
Qty: 30 CAPSULE | Refills: 0 | Status: CANCELLED | OUTPATIENT
Start: 2019-08-19

## 2019-08-19 RX ORDER — TOPIRAMATE 25 MG/1
25 TABLET ORAL 2 TIMES DAILY
Refills: 1 | COMMUNITY
Start: 2019-07-16 | End: 2019-08-19 | Stop reason: DRUGHIGH

## 2019-08-19 RX ORDER — TOPIRAMATE 50 MG/1
50 TABLET, FILM COATED ORAL 2 TIMES DAILY
Qty: 60 TABLET | Refills: 2 | Status: SHIPPED | OUTPATIENT
Start: 2019-08-19 | End: 2019-10-15

## 2019-08-19 RX ORDER — BUTALBITAL, ACETAMINOPHEN AND CAFFEINE 50; 325; 40 MG/1; MG/1; MG/1
TABLET ORAL
Qty: 30 TABLET | Refills: 0 | Status: SHIPPED | OUTPATIENT
Start: 2019-08-19 | End: 2019-11-09 | Stop reason: SDUPTHER

## 2019-08-19 RX ORDER — TOPIRAMATE 25 MG/1
TABLET ORAL
Qty: 60 TABLET | Refills: 1 | OUTPATIENT
Start: 2019-08-19

## 2019-08-19 NOTE — PROGRESS NOTES
Lawrence Rich is a 49year old female presents today for 3 month follow-up on medical weight loss program for the treatment of overweight, obesity, or morbid obesity with associated HTN, OA, GERD, .    S:  Current weight Wt Readings from Last 6 Encounters soft  NEURO/MS: motor and sensory grossly intact  PSYCH: pleasant, cooperative, normal mood and affect    ASSESSMENT AND PLAN:  Encounter for therapeutic drug monitoring  (primary encounter diagnosis)  Morbid obesity with bmi of 40.0-44.9, adult (hcc)  Ess weight gain   Emotional Eating under Stress  Have you ever found yourself mindlessly eating a tub of ice cream while you brood about your latest romantic rejection or eating a hamburger and fries in front of your computer as you furiously try to make a wor the stressor! Unfortunately, we are stuck with a neuroendocrine system that didn’t get the update, so your brain is still going to tell you to reach for that plate of cookies anyway.   Belly Fat  In the days when our ancestors were fighting off tigers and f and is inactive, which means that those extra calories aren’t getting burned off.  Anxiety can also make you eat more “mindlessly” as you churn around worrying thoughts in your head, not even focusing on the taste of the food, how much you’ve eaten, or when sleep because of pulling overnights to cram for exams or writing until the early hours. Stress causes decreased blood sugar, which leads to fatigue.  If you drink coffee or caffeinated soft drinks to stay awake, or alcohol to feel better, your sleep cycle w don’t have time for leisure activities with looming deadlines, taking time to relieve stress helps you to feel refreshed, lets you think more clearly, and improves your mood, so you are less likely to overeat.   Write in a Journal  Writing down your experie

## 2019-08-19 NOTE — PATIENT INSTRUCTIONS
Continue making lifestyle changes that focus on good nutrition, regular exercise and stress management. Medication Plan: Increase phentermine and Topamax dose.     Next steps to work on before next office visit include: Be mindful of impact stress can ha adrenaline wear off, cortisol, known as the “stress hormone,” hangs around and starts signaling the body to replenish your food supply.  Fighting off wild animals, like our ancestors did, used up a lot of energy, so their bodies needed more stores of fat an trigger “emotional eating.” Overeating or eating unhealthy foods in response to stress or as a way to calm down is a very common response.  In the most recent American Psychological Association’s “Stress in [employer], a whopping 40% of respondents re pellets, when given the choice of eating these instead of normal feed. Less Sleep  Do you ever lie awake at night worrying about paying the bills or about who will watch your kids when you have to go to work?  According to the APA’s “Stress in [employer] or because there is food in front of you. A well-designed study of binge-eaters showed that participating in a Mindful Eating program led to fewer binges and reduced depression.   Find Rewarding Activities Unrelated to Food  Taking a hike, reading a book, g

## 2019-08-20 ENCOUNTER — TELEPHONE (OUTPATIENT)
Dept: INTERNAL MEDICINE CLINIC | Facility: CLINIC | Age: 54
End: 2019-08-20

## 2019-08-21 ENCOUNTER — OFFICE VISIT (OUTPATIENT)
Dept: INTERNAL MEDICINE CLINIC | Facility: CLINIC | Age: 54
End: 2019-08-21
Payer: COMMERCIAL

## 2019-08-21 DIAGNOSIS — E66.01 OBESITY, CLASS III, BMI 40-49.9 (MORBID OBESITY) (HCC): ICD-10-CM

## 2019-08-21 DIAGNOSIS — K21.00 GASTROESOPHAGEAL REFLUX DISEASE WITH ESOPHAGITIS: ICD-10-CM

## 2019-08-21 DIAGNOSIS — I10 ESSENTIAL HYPERTENSION: Primary | ICD-10-CM

## 2019-08-21 PROCEDURE — 97802 MEDICAL NUTRITION INDIV IN: CPT | Performed by: DIETITIAN, REGISTERED

## 2019-08-21 NOTE — TELEPHONE ENCOUNTER
8/20/19 @ 10:42am Spoke to [place] at [facility], [phone], LVF#[id number]. She verified that patient has following benefits for Bariatric services:   • 6 month medical weight management criteria. •  [address] [place]  is required.    • Patient must register with Shiela Chavez

## 2019-08-26 RX ORDER — BUPROPION HYDROCHLORIDE 300 MG/1
TABLET ORAL
Qty: 30 TABLET | Refills: 0 | Status: SHIPPED | OUTPATIENT
Start: 2019-08-26 | End: 2019-09-22 | Stop reason: SDUPTHER

## 2019-08-26 RX ORDER — BUPROPION HYDROCHLORIDE 150 MG/1
TABLET ORAL
Qty: 30 TABLET | Refills: 0 | Status: SHIPPED | OUTPATIENT
Start: 2019-08-26 | End: 2019-09-22 | Stop reason: SDUPTHER

## 2019-08-26 NOTE — PROGRESS NOTES
INITIAL OUTPATIENT NUTRITION CONSULTATION    Nutrition Assessment    Medical Diagnosis: Obesity, GERD, HTN    Client Age and Gender: 49year old female    Marital Status and Occupation:  with twin seniors in high school      Meds:    Current Out lbs in the past 3 months    Diet/Weight History: Max weight of 314 lbs. Not overweight until marriage. Weight gain has continued for the past 20+ years. Has tried multiple weight loss diets with moderate success and regain.   Lowest weight of 258 lbs was continuing avoiding. Recall of diet shows very healthy intake. Pt states she learned how to eat through previous weight camp program.  Encouragement to continue was given. Pt has goal of resuming keeping a food journal a working out more often.     Goals

## 2019-09-16 ENCOUNTER — OFFICE VISIT (OUTPATIENT)
Dept: INTERNAL MEDICINE CLINIC | Facility: CLINIC | Age: 54
End: 2019-09-16
Payer: COMMERCIAL

## 2019-09-16 ENCOUNTER — TELEPHONE (OUTPATIENT)
Dept: INTERNAL MEDICINE CLINIC | Facility: CLINIC | Age: 54
End: 2019-09-16

## 2019-09-16 VITALS
DIASTOLIC BLOOD PRESSURE: 76 MMHG | SYSTOLIC BLOOD PRESSURE: 104 MMHG | WEIGHT: 276.63 LBS | BODY MASS INDEX: 49.63 KG/M2 | HEIGHT: 62.5 IN | HEART RATE: 84 BPM

## 2019-09-16 DIAGNOSIS — E66.01 MORBID OBESITY WITH BMI OF 40.0-44.9, ADULT (HCC): ICD-10-CM

## 2019-09-16 DIAGNOSIS — I10 ESSENTIAL HYPERTENSION: ICD-10-CM

## 2019-09-16 DIAGNOSIS — Z51.81 ENCOUNTER FOR THERAPEUTIC DRUG MONITORING: Primary | ICD-10-CM

## 2019-09-16 PROCEDURE — 99213 OFFICE O/P EST LOW 20 MIN: CPT | Performed by: NURSE PRACTITIONER

## 2019-09-16 RX ORDER — PHENTERMINE HYDROCHLORIDE 37.5 MG/1
37.5 TABLET ORAL EVERY MORNING
Qty: 30 TABLET | Refills: 0 | Status: SHIPPED | OUTPATIENT
Start: 2019-09-16 | End: 2019-10-21

## 2019-09-16 NOTE — PATIENT INSTRUCTIONS
Continue making lifestyle changes that focus on good nutrition, regular exercise and stress management. Medication Plan: Continue current medication regimen. Option to increase Topamax dose as discussed.     Next steps to work on before next office visit [email or web address] com  · Diet Doctor @ www. dietdoctor. com - low carb swaps    Stress Management/Behavior/Mindful Eating  · CALM meditation kiarra  · Headspace  · Am I Hungry? Mindful eating virtual  kiarra  · [email or web address] org - Obesity Action Coaliti

## 2019-09-16 NOTE — PROGRESS NOTES
Pratibha Ceballos is a 49year old female presents today for 4 month follow-up on medical weight loss program for the treatment of overweight, obesity, or morbid obesity with associated HTN, OA, GERD, .    S:  Current weight Wt Readings from Last 6 Encounters soft  NEURO/MS: motor and sensory grossly intact  PSYCH: pleasant, cooperative, normal mood and affect    ASSESSMENT AND PLAN:  Encounter for therapeutic drug monitoring  (primary encounter diagnosis)  Morbid obesity with bmi of 40.0-44.9, adult (hcc)  Ess Garry Cardenas at our [employer]- individual weekly coaching with option to add personal training and small group fitness classes targeted at weight loss- [phone] and/or email @ [email or web address]  · AnySize Fitness @ [email or web address]

## 2019-09-24 RX ORDER — BUPROPION HYDROCHLORIDE 300 MG/1
TABLET ORAL
Qty: 30 TABLET | Refills: 0 | Status: SHIPPED | OUTPATIENT
Start: 2019-09-24 | End: 2019-10-25 | Stop reason: SDUPTHER

## 2019-09-24 RX ORDER — BUPROPION HYDROCHLORIDE 150 MG/1
TABLET ORAL
Qty: 30 TABLET | Refills: 0 | Status: SHIPPED | OUTPATIENT
Start: 2019-09-24 | End: 2019-10-25 | Stop reason: SDUPTHER

## 2019-10-04 ENCOUNTER — APPOINTMENT (OUTPATIENT)
Dept: INTERNAL MEDICINE | Age: 54
End: 2019-10-04

## 2019-10-14 ENCOUNTER — OFFICE VISIT (OUTPATIENT)
Dept: INTERNAL MEDICINE CLINIC | Facility: CLINIC | Age: 54
End: 2019-10-14
Payer: COMMERCIAL

## 2019-10-14 VITALS — WEIGHT: 273 LBS | BODY MASS INDEX: 49 KG/M2

## 2019-10-14 DIAGNOSIS — K21.00 GASTROESOPHAGEAL REFLUX DISEASE WITH ESOPHAGITIS: ICD-10-CM

## 2019-10-14 DIAGNOSIS — E66.01 OBESITY, CLASS III, BMI 40-49.9 (MORBID OBESITY) (HCC): ICD-10-CM

## 2019-10-14 DIAGNOSIS — I10 ESSENTIAL HYPERTENSION: ICD-10-CM

## 2019-10-14 PROCEDURE — 97803 MED NUTRITION INDIV SUBSEQ: CPT | Performed by: DIETITIAN, REGISTERED

## 2019-10-14 NOTE — PROGRESS NOTES
FOLLOW UP NUTRITION CONSULTATION    Nutrition Assessment    Number of consultations with dietitian: 2    Height:  Ht Readings from Last 1 Encounters:  09/16/19 : 62.5\"      Weight:   Wt Readings from Last 2 Encounters:  10/14/19 : 273 lb (123.8 kg)  09

## 2019-10-15 DIAGNOSIS — G43.909 MIGRAINE WITHOUT STATUS MIGRAINOSUS, NOT INTRACTABLE, UNSPECIFIED MIGRAINE TYPE: ICD-10-CM

## 2019-10-15 DIAGNOSIS — Z51.81 ENCOUNTER FOR THERAPEUTIC DRUG MONITORING: ICD-10-CM

## 2019-10-15 DIAGNOSIS — E66.01 MORBID OBESITY WITH BMI OF 40.0-44.9, ADULT (HCC): ICD-10-CM

## 2019-10-15 NOTE — TELEPHONE ENCOUNTER
Requesting Topiramate  LOV: 9/16/19  RTC: one month  Last Relevant Labs: na  Filled: 8/19/19 #60 with 2 refills    Future Appointments   Date Time Provider Ole Llanes   10/21/2019  9:00 AM KATERIN Toth [facility]     Will you appr

## 2019-10-19 RX ORDER — TOPIRAMATE 50 MG/1
50 TABLET, FILM COATED ORAL 2 TIMES DAILY
Qty: 180 TABLET | Refills: 0 | Status: SHIPPED | OUTPATIENT
Start: 2019-10-19 | End: 2019-10-21

## 2019-10-21 ENCOUNTER — OFFICE VISIT (OUTPATIENT)
Dept: INTERNAL MEDICINE CLINIC | Facility: CLINIC | Age: 54
End: 2019-10-21
Payer: COMMERCIAL

## 2019-10-21 VITALS
SYSTOLIC BLOOD PRESSURE: 110 MMHG | WEIGHT: 273.63 LBS | BODY MASS INDEX: 45.04 KG/M2 | HEIGHT: 65.5 IN | DIASTOLIC BLOOD PRESSURE: 78 MMHG | HEART RATE: 68 BPM

## 2019-10-21 DIAGNOSIS — E66.01 MORBID OBESITY WITH BMI OF 40.0-44.9, ADULT (HCC): ICD-10-CM

## 2019-10-21 DIAGNOSIS — F43.9 STRESS: ICD-10-CM

## 2019-10-21 DIAGNOSIS — Z51.81 ENCOUNTER FOR THERAPEUTIC DRUG MONITORING: Primary | ICD-10-CM

## 2019-10-21 DIAGNOSIS — G43.909 MIGRAINE WITHOUT STATUS MIGRAINOSUS, NOT INTRACTABLE, UNSPECIFIED MIGRAINE TYPE: ICD-10-CM

## 2019-10-21 PROCEDURE — 99214 OFFICE O/P EST MOD 30 MIN: CPT | Performed by: NURSE PRACTITIONER

## 2019-10-21 RX ORDER — PHENTERMINE HYDROCHLORIDE 37.5 MG/1
37.5 TABLET ORAL EVERY MORNING
Qty: 30 TABLET | Refills: 1 | Status: SHIPPED | OUTPATIENT
Start: 2019-10-21 | End: 2019-12-19

## 2019-10-21 RX ORDER — TOPIRAMATE 100 MG/1
100 TABLET, FILM COATED ORAL 2 TIMES DAILY
Qty: 180 TABLET | Refills: 1 | Status: SHIPPED | OUTPATIENT
Start: 2019-10-21 | End: 2020-04-29

## 2019-10-21 NOTE — PATIENT INSTRUCTIONS
Continue making lifestyle changes that focus on good nutrition, regular exercise and stress management. Medication Plan: Continue current medication regimen, aside from increase Topamax to 100 mg twice a day.     Next steps to work on before next office Fitness  · [employer] - on the go group exercise  · [place] 7 Minute Workout - free on the go HIIT training  · 5 Minute Kitchen Workout [email or web address]    Nutrition Trackers and Tools  · MyFitness Pal  · LoseIT!   · FitFoundation (h

## 2019-10-21 NOTE — PROGRESS NOTES
Pratibha Ceballos is a 49year old female presents today for 5 month follow-up on medical weight loss program for the treatment of overweight, obesity, or morbid obesity with associated HTN, OA, GERD, .    S:  Current weight Wt Readings from Last 6 Encounters non icteric, PERRLA  LUNGS: CTA in all fields, breathing non labored  CARDIO: RRR without murmur, normal S1 and S2 without clicks or gallops, no pedal edema.   GI: +BS, soft  NEURO/MS: motor and sensory grossly intact  PSYCH: pleasant, cooperative, normal m time.    Intermittent Fasting Options:    Time restricted eating: Eat only in an 8 hour window, fast for 12-16 hours consecutively of the day. Drinking water during the fasting time is okay. Additionally make a routine of reading the daily blog from [employer] www.[email or web address]. com  · Diet Doctor @ www. dietdoctor. com - low carb swaps    Stress Management/Behavior/Mindful Eating  · CALM meditation kiarra  · Headspace  · Am I Hungry? Mindful eating virtual  kiarra  · [email or web address].[email or web address] org - Obesity Action Coaliti

## 2019-10-25 ENCOUNTER — OFFICE VISIT (OUTPATIENT)
Dept: INTERNAL MEDICINE | Age: 54
End: 2019-10-25

## 2019-10-25 VITALS
HEART RATE: 72 BPM | BODY MASS INDEX: 43.71 KG/M2 | WEIGHT: 272 LBS | HEIGHT: 66 IN | DIASTOLIC BLOOD PRESSURE: 70 MMHG | SYSTOLIC BLOOD PRESSURE: 106 MMHG | TEMPERATURE: 96.2 F | RESPIRATION RATE: 20 BRPM

## 2019-10-25 DIAGNOSIS — F32.A DEPRESSION, UNSPECIFIED DEPRESSION TYPE: ICD-10-CM

## 2019-10-25 DIAGNOSIS — E66.01 CLASS 3 SEVERE OBESITY DUE TO EXCESS CALORIES WITH BODY MASS INDEX (BMI) OF 40.0 TO 44.9 IN ADULT, UNSPECIFIED WHETHER SERIOUS COMORBIDITY PRESENT (CMD): ICD-10-CM

## 2019-10-25 DIAGNOSIS — I10 ESSENTIAL HYPERTENSION: Primary | ICD-10-CM

## 2019-10-25 PROCEDURE — 99214 OFFICE O/P EST MOD 30 MIN: CPT | Performed by: INTERNAL MEDICINE

## 2019-10-25 PROCEDURE — 3078F DIAST BP <80 MM HG: CPT | Performed by: INTERNAL MEDICINE

## 2019-10-25 PROCEDURE — 3074F SYST BP LT 130 MM HG: CPT | Performed by: INTERNAL MEDICINE

## 2019-10-25 RX ORDER — TOPIRAMATE 100 MG/1
TABLET, FILM COATED ORAL
COMMUNITY
Start: 2019-10-21 | End: 2019-10-25 | Stop reason: DRUGHIGH

## 2019-10-25 RX ORDER — PHENTERMINE HYDROCHLORIDE 37.5 MG/1
TABLET ORAL
COMMUNITY
Start: 2019-10-21 | End: 2019-10-25 | Stop reason: ALTCHOICE

## 2019-10-25 RX ORDER — TOPIRAMATE 100 MG/1
100 TABLET, FILM COATED ORAL
COMMUNITY
Start: 2019-10-21 | End: 2021-10-29 | Stop reason: ALTCHOICE

## 2019-10-25 RX ORDER — BUPROPION HYDROCHLORIDE 150 MG/1
150 TABLET ORAL EVERY MORNING
Qty: 90 TABLET | Refills: 1 | Status: SHIPPED | OUTPATIENT
Start: 2019-10-25 | End: 2020-02-05 | Stop reason: DRUGHIGH

## 2019-10-25 RX ORDER — TOPIRAMATE 50 MG/1
50 TABLET, FILM COATED ORAL 2 TIMES DAILY
Refills: 2 | COMMUNITY
Start: 2019-09-15 | End: 2019-10-25 | Stop reason: ALTCHOICE

## 2019-10-25 RX ORDER — PHENTERMINE HYDROCHLORIDE 15 MG/1
15 CAPSULE ORAL EVERY MORNING
Refills: 0 | COMMUNITY
Start: 2019-07-22 | End: 2020-02-05 | Stop reason: ALTCHOICE

## 2019-10-25 RX ORDER — BUPROPION HYDROCHLORIDE 300 MG/1
300 TABLET ORAL EVERY MORNING
Qty: 90 TABLET | Refills: 1 | Status: SHIPPED | OUTPATIENT
Start: 2019-10-25 | End: 2020-04-22

## 2019-10-25 RX ORDER — TRAMADOL HYDROCHLORIDE 50 MG/1
50 TABLET ORAL
COMMUNITY
Start: 2019-05-01 | End: 2020-02-05 | Stop reason: ALTCHOICE

## 2019-10-25 SDOH — HEALTH STABILITY: MENTAL HEALTH
STRESS IS WHEN SOMEONE FEELS TENSE, NERVOUS, ANXIOUS, OR CAN'T SLEEP AT NIGHT BECAUSE THEIR MIND IS TROUBLED. HOW STRESSED ARE YOU?: TO SOME EXTENT

## 2019-10-25 SDOH — HEALTH STABILITY: PHYSICAL HEALTH: ON AVERAGE, HOW MANY DAYS PER WEEK DO YOU ENGAGE IN MODERATE TO STRENUOUS EXERCISE (LIKE A BRISK WALK)?: 5 DAYS

## 2019-10-25 SDOH — HEALTH STABILITY: PHYSICAL HEALTH: ON AVERAGE, HOW MANY MINUTES DO YOU ENGAGE IN EXERCISE AT THIS LEVEL?: 30 MIN

## 2019-10-25 ASSESSMENT — PATIENT HEALTH QUESTIONNAIRE - PHQ9
2. FEELING DOWN, DEPRESSED OR HOPELESS: NOT AT ALL
SUM OF ALL RESPONSES TO PHQ9 QUESTIONS 1 AND 2: 0
1. LITTLE INTEREST OR PLEASURE IN DOING THINGS: NOT AT ALL
SUM OF ALL RESPONSES TO PHQ9 QUESTIONS 1 AND 2: 0

## 2019-11-12 RX ORDER — BUTALBITAL, ACETAMINOPHEN AND CAFFEINE 50; 325; 40 MG/1; MG/1; MG/1
TABLET ORAL
Qty: 30 TABLET | Refills: 0 | Status: SHIPPED | OUTPATIENT
Start: 2019-11-12 | End: 2020-04-08

## 2019-11-13 RX ORDER — LISINOPRIL 20 MG/1
TABLET ORAL
Qty: 90 TABLET | Refills: 1 | Status: SHIPPED | OUTPATIENT
Start: 2019-11-13 | End: 2020-04-24 | Stop reason: SDUPTHER

## 2019-11-26 RX ORDER — PANTOPRAZOLE SODIUM 40 MG/1
TABLET, DELAYED RELEASE ORAL
Qty: 90 TABLET | Refills: 2 | Status: SHIPPED | OUTPATIENT
Start: 2019-11-26 | End: 2020-06-27 | Stop reason: SDUPTHER

## 2019-12-19 ENCOUNTER — OFFICE VISIT (OUTPATIENT)
Dept: INTERNAL MEDICINE CLINIC | Facility: CLINIC | Age: 54
End: 2019-12-19
Payer: COMMERCIAL

## 2019-12-19 VITALS
WEIGHT: 278 LBS | HEART RATE: 80 BPM | SYSTOLIC BLOOD PRESSURE: 110 MMHG | HEIGHT: 65.5 IN | DIASTOLIC BLOOD PRESSURE: 80 MMHG | BODY MASS INDEX: 45.76 KG/M2 | RESPIRATION RATE: 14 BRPM

## 2019-12-19 DIAGNOSIS — I10 ESSENTIAL HYPERTENSION: ICD-10-CM

## 2019-12-19 DIAGNOSIS — E66.01 MORBID OBESITY WITH BMI OF 40.0-44.9, ADULT (HCC): ICD-10-CM

## 2019-12-19 DIAGNOSIS — Z51.81 ENCOUNTER FOR THERAPEUTIC DRUG MONITORING: Primary | ICD-10-CM

## 2019-12-19 DIAGNOSIS — K21.00 GASTROESOPHAGEAL REFLUX DISEASE WITH ESOPHAGITIS: ICD-10-CM

## 2019-12-19 PROCEDURE — 99213 OFFICE O/P EST LOW 20 MIN: CPT | Performed by: NURSE PRACTITIONER

## 2019-12-19 RX ORDER — PHENTERMINE HYDROCHLORIDE 37.5 MG/1
37.5 TABLET ORAL EVERY MORNING
Qty: 30 TABLET | Refills: 1 | Status: SHIPPED | OUTPATIENT
Start: 2019-12-19 | End: 2020-01-23

## 2019-12-19 NOTE — PATIENT INSTRUCTIONS
Continue making lifestyle changes that focus on good nutrition, regular exercise and stress management. Medication Plan: Continue current medication regimen. Next steps to work on before next office visit include: Corina Drake work resuming exercise!  Contin and other nutrients. These essential nutrients matter greatly to every single cell in your body.  They make up your:  • Cell membrane  • Nucleus  • Mitochondria  When cells join together, they make tissue that brings life to your bones, brain, skin, nerves

## 2019-12-19 NOTE — PROGRESS NOTES
Thomas Lyman is a 49year old female presents today for 7 month follow-up on medical weight loss program for the treatment of overweight, obesity, or morbid obesity with associated HTN, OA, GERD, .    S:  Current weight Wt Readings from Last 6 Encounters CTA in all fields, breathing non labored  CARDIO: RRR without murmur, normal S1 and S2 without clicks or gallops, no pedal edema.   GI: +BS, soft  NEURO/MS: motor and sensory grossly intact  PSYCH: pleasant, cooperative, normal mood and affect    ASSESSMENT motivation, exercise and nutrition tips relevant to today's culture. Set a notification on your smart phone to say \"Read the [facility] Blog\".     Re-thinking Nutrition: Learning to See Food as Fuel  October 8, 2019 • Posted in Brice Limon, Nutrition • By Your Weight M Blocks of Good Nutrition  A diet without enough fiber, protein and healthy fats can cause your cells to become brittle, leaky and tired.  When cells can't do what they are designed to do, problems like inflammation, cancer and other difficult health conditi

## 2020-01-23 ENCOUNTER — OFFICE VISIT (OUTPATIENT)
Dept: INTERNAL MEDICINE CLINIC | Facility: CLINIC | Age: 55
End: 2020-01-23
Payer: COMMERCIAL

## 2020-01-23 VITALS
HEART RATE: 99 BPM | BODY MASS INDEX: 44.94 KG/M2 | OXYGEN SATURATION: 100 % | SYSTOLIC BLOOD PRESSURE: 120 MMHG | DIASTOLIC BLOOD PRESSURE: 80 MMHG | RESPIRATION RATE: 16 BRPM | HEIGHT: 65.5 IN | TEMPERATURE: 98 F | WEIGHT: 273 LBS

## 2020-01-23 DIAGNOSIS — Z51.81 ENCOUNTER FOR THERAPEUTIC DRUG MONITORING: Primary | ICD-10-CM

## 2020-01-23 DIAGNOSIS — E66.01 MORBID OBESITY WITH BMI OF 40.0-44.9, ADULT (HCC): ICD-10-CM

## 2020-01-23 DIAGNOSIS — K21.00 GASTROESOPHAGEAL REFLUX DISEASE WITH ESOPHAGITIS: ICD-10-CM

## 2020-01-23 DIAGNOSIS — I10 ESSENTIAL HYPERTENSION: ICD-10-CM

## 2020-01-23 PROCEDURE — 99213 OFFICE O/P EST LOW 20 MIN: CPT | Performed by: NURSE PRACTITIONER

## 2020-01-23 RX ORDER — PHENTERMINE HYDROCHLORIDE 37.5 MG/1
37.5 TABLET ORAL EVERY MORNING
Qty: 30 TABLET | Refills: 1 | Status: SHIPPED | OUTPATIENT
Start: 2020-01-23 | End: 2020-03-19

## 2020-01-23 NOTE — PATIENT INSTRUCTIONS
Continue making lifestyle changes that focus on good nutrition, regular exercise and stress management. Medication Plan: Continue current medication regimen. Next steps to work on before next office visit include: [employer]j [employer] job!  Continue to be mindful ab nutritious foods can do your brain a lot of good. If you struggle with mental health disorders or need help concentrating on work or school, you'll probably notice a huge difference — just by being more mindful of your diet!   These foods can help you stay never leave you feeling hungry, weak, unfocused or deprived. Moderation is always key!     Patient Resources:    Personal Training/Fitness Classes    · Karen Edouard and [place] @ [email or web address] Full f Education  · Mindless Eating by Theresa Mix  · The End of Dieting: How to Live for Life by Dr. Nargis Tran M.D. · The Complete Guide to fasting by Dr. Jazzy Pratt  · Sugar, Kennyth Roles by Katie Saravia, Ph.D, R.D.   · The Game Changers- Igneous Systems Documentary on p

## 2020-01-23 NOTE — PROGRESS NOTES
Winston Jay is a 49year old female presents today for 8 month follow-up on medical weight loss program for the treatment of overweight, obesity, or morbid obesity with associated HTN, OA, GERD, .    S:  Current weight Wt Readings from Last 6 Encounters sclera non icteric, PERRLA  LUNGS: CTA in all fields, breathing non labored  CARDIO: RRR without murmur, normal S1 and S2 without clicks or gallops, no pedal edema.   GI: +BS, soft  NEURO/MS: motor and sensory grossly intact  PSYCH: pleasant, cooperative, n • By Your Weight Matters Campaign    The science of nutrition is quite amazing. It's no secret that what we eat will have an effect on our bodies.  We know that regularly eating nutritious foods will:  • Give us more energy  • Build and repair muscles  • Co new connections. This can help with learning and memory. Fatty Fish  Fish and other seafood have a lot of omega-3 fatty acids. These are great for your thinking abilities, increasing blood flow in the brain and building the structure of neurons.   Whole Gr training and small group fitness classes targeted at weight loss- [phone] and/or email @ [email or web address]  · AnySize Fitness @ [email or web address].  Personal training and Group Fitness classes with Elif Guajardo [phone]  · 3 about 1 month (around 2/23/2020) for weight management. Patient verbalizes understanding.

## 2020-01-28 ENCOUNTER — TELEPHONE (OUTPATIENT)
Dept: INTERNAL MEDICINE | Age: 55
End: 2020-01-28

## 2020-02-03 ENCOUNTER — LAB SERVICES (OUTPATIENT)
Dept: LAB | Age: 55
End: 2020-02-03

## 2020-02-03 DIAGNOSIS — I10 ESSENTIAL HYPERTENSION: Primary | ICD-10-CM

## 2020-02-03 DIAGNOSIS — I10 ESSENTIAL HYPERTENSION: ICD-10-CM

## 2020-02-03 LAB
ALBUMIN SERPL BCG-MCNC: 4.4 G/DL (ref 3.6–5.1)
ALP SERPL-CCNC: 65 U/L (ref 45–130)
ALT SERPL W/O P-5'-P-CCNC: 15 U/L (ref 7–34)
AST SERPL-CCNC: 16 U/L (ref 9–37)
BILIRUB SERPL-MCNC: 0.2 MG/DL (ref 0–1)
BUN SERPL-MCNC: 24 MG/DL (ref 7–20)
CALCIUM SERPL-MCNC: 9.9 MG/DL (ref 8.6–10.6)
CHLORIDE SERPL-SCNC: 110 MMOL/L (ref 96–107)
CHOLEST SERPL-MCNC: 204 MG/DL (ref 140–200)
CREAT SERPL-MCNC: 1.4 MG/DL (ref 0.5–1.4)
DIFFERENTIAL TYPE: ABNORMAL
GFR SERPL CREATININE-BSD FRML MDRD: 39 ML/MIN/{1.73M2}
GFR SERPL CREATININE-BSD FRML MDRD: 47 ML/MIN/{1.73M2}
GLUCOSE P FAST SERPL-MCNC: 104 MG/DL (ref 60–100)
HCO3 SERPL-SCNC: 24 MMOL/L (ref 22–32)
HDLC SERPL-MCNC: 67 MG/DL
HEMATOCRIT: 39.2 % (ref 34–45)
HEMOGLOBIN: 12.7 G/DL (ref 11.2–15.7)
LDLC SERPL CALC-MCNC: 125 MG/DL (ref 0–100)
LYMPH PERCENT: 40.5 % (ref 20.5–51.1)
LYMPHOCYTE ABSOLUTE #: 2.2 10*3/UL (ref 1.2–3.4)
MEAN CORPUSCULAR HGB CONCENTRATION: 32.4 % (ref 32–36)
MEAN CORPUSCULAR HGB: 30.3 PG (ref 27–34)
MEAN CORPUSCULAR VOLUME: 93.6 FL (ref 79–95)
MEAN PLATELET VOLUME: 7.6 FL (ref 8.6–12.4)
MIXED %: 8.6 % (ref 4.3–12.9)
MIXED ABSOLUTE #: 0.5 10*3/UL (ref 0.2–0.9)
NEUTROPHIL ABSOLUTE #: 2.8 10*3/UL (ref 1.4–6.5)
NEUTROPHIL PERCENT: 50.9 % (ref 34–73.5)
PLATELET COUNT: 403 10*3/UL (ref 150–400)
POTASSIUM SERPL-SCNC: 4.6 MMOL/L (ref 3.5–5.3)
PROT SERPL-MCNC: 7 G/DL (ref 6.2–8.1)
RED BLOOD CELL COUNT: 4.19 10*6/UL (ref 3.7–5.2)
RED CELL DISTRIBUTION WIDTH: 13.1 % (ref 11.3–14.8)
SODIUM SERPL-SCNC: 141 MMOL/L (ref 136–146)
TRIGL SERPL-MCNC: 56 MG/DL (ref 0–200)
TSH SERPL DL<=0.05 MIU/L-ACNC: 1.44 M[IU]/L (ref 0.3–4.82)
WHITE BLOOD CELL COUNT: 5.5 10*3/UL (ref 4–10)

## 2020-02-03 PROCEDURE — 36415 COLL VENOUS BLD VENIPUNCTURE: CPT | Performed by: INTERNAL MEDICINE

## 2020-02-03 PROCEDURE — 80061 LIPID PANEL: CPT | Performed by: INTERNAL MEDICINE

## 2020-02-03 PROCEDURE — 80050 GENERAL HEALTH PANEL: CPT | Performed by: INTERNAL MEDICINE

## 2020-02-05 ENCOUNTER — TELEPHONE (OUTPATIENT)
Dept: GASTROENTEROLOGY | Age: 55
End: 2020-02-05

## 2020-02-05 ENCOUNTER — OFFICE VISIT (OUTPATIENT)
Dept: INTERNAL MEDICINE | Age: 55
End: 2020-02-05

## 2020-02-05 VITALS
DIASTOLIC BLOOD PRESSURE: 70 MMHG | BODY MASS INDEX: 42.59 KG/M2 | TEMPERATURE: 97 F | HEART RATE: 72 BPM | RESPIRATION RATE: 14 BRPM | SYSTOLIC BLOOD PRESSURE: 124 MMHG | HEIGHT: 66 IN | WEIGHT: 265 LBS

## 2020-02-05 DIAGNOSIS — Z86.010 PERSONAL HISTORY OF COLONIC POLYPS: ICD-10-CM

## 2020-02-05 DIAGNOSIS — Z86.010 HISTORY OF COLON POLYPS: ICD-10-CM

## 2020-02-05 DIAGNOSIS — R73.01 IMPAIRED FASTING GLUCOSE: ICD-10-CM

## 2020-02-05 DIAGNOSIS — Z12.31 ENCOUNTER FOR SCREENING MAMMOGRAM FOR MALIGNANT NEOPLASM OF BREAST: ICD-10-CM

## 2020-02-05 DIAGNOSIS — R94.4 DECREASED GFR: ICD-10-CM

## 2020-02-05 DIAGNOSIS — Z00.00 ROUTINE GENERAL MEDICAL EXAMINATION AT HEALTH CARE FACILITY: Primary | ICD-10-CM

## 2020-02-05 DIAGNOSIS — K22.70 BARRETT'S ESOPHAGUS WITHOUT DYSPLASIA: Primary | ICD-10-CM

## 2020-02-05 DIAGNOSIS — K22.70 BARRETT'S ESOPHAGUS WITHOUT DYSPLASIA: ICD-10-CM

## 2020-02-05 PROCEDURE — 99396 PREV VISIT EST AGE 40-64: CPT | Performed by: INTERNAL MEDICINE

## 2020-02-05 PROCEDURE — 3074F SYST BP LT 130 MM HG: CPT | Performed by: INTERNAL MEDICINE

## 2020-02-05 PROCEDURE — 3078F DIAST BP <80 MM HG: CPT | Performed by: INTERNAL MEDICINE

## 2020-02-05 RX ORDER — PHENTERMINE HYDROCHLORIDE 37.5 MG/1
TABLET ORAL
Refills: 1 | COMMUNITY
Start: 2020-01-23 | End: 2021-10-29 | Stop reason: ALTCHOICE

## 2020-02-05 SDOH — HEALTH STABILITY: PHYSICAL HEALTH: ON AVERAGE, HOW MANY DAYS PER WEEK DO YOU ENGAGE IN MODERATE TO STRENUOUS EXERCISE (LIKE A BRISK WALK)?: 4 DAYS

## 2020-02-05 SDOH — HEALTH STABILITY: PHYSICAL HEALTH: ON AVERAGE, HOW MANY MINUTES DO YOU ENGAGE IN EXERCISE AT THIS LEVEL?: 60 MIN

## 2020-02-05 ASSESSMENT — PATIENT HEALTH QUESTIONNAIRE - PHQ9
SUM OF ALL RESPONSES TO PHQ9 QUESTIONS 1 AND 2: 0
1. LITTLE INTEREST OR PLEASURE IN DOING THINGS: NOT AT ALL
SUM OF ALL RESPONSES TO PHQ9 QUESTIONS 1 AND 2: 0
2. FEELING DOWN, DEPRESSED OR HOPELESS: NOT AT ALL

## 2020-02-07 RX ORDER — BISACODYL 5 MG/1
TABLET, DELAYED RELEASE ORAL
Qty: 2 TABLET | Refills: 0 | Status: SHIPPED | OUTPATIENT
Start: 2020-02-07 | End: 2020-03-23

## 2020-02-07 RX ORDER — SIMETHICONE 125 MG
TABLET,CHEWABLE ORAL
Qty: 2 TABLET | Refills: 0 | Status: SHIPPED | OUTPATIENT
Start: 2020-02-07 | End: 2020-03-23

## 2020-02-08 ENCOUNTER — E-ADVICE (OUTPATIENT)
Dept: INTERNAL MEDICINE | Age: 55
End: 2020-02-08

## 2020-02-12 ENCOUNTER — LAB SERVICES (OUTPATIENT)
Dept: LAB | Age: 55
End: 2020-02-12

## 2020-02-12 DIAGNOSIS — R94.4 DECREASED GFR: ICD-10-CM

## 2020-02-12 DIAGNOSIS — R73.01 IMPAIRED FASTING GLUCOSE: ICD-10-CM

## 2020-02-12 LAB
BUN SERPL-MCNC: 26 MG/DL (ref 7–20)
CALCIUM SERPL-MCNC: 9.6 MG/DL (ref 8.6–10.6)
CHLORIDE SERPL-SCNC: 107 MMOL/L (ref 96–107)
CREAT SERPL-MCNC: 1.2 MG/DL (ref 0.5–1.4)
EST. AVERAGE GLUCOSE BLD GHB EST-MCNC: 113 MG/DL (ref 0–154)
GFR SERPL CREATININE-BSD FRML MDRD: 48 ML/MIN/{1.73M2}
GFR SERPL CREATININE-BSD FRML MDRD: 59 ML/MIN/{1.73M2}
GLUCOSE SERPL-MCNC: 101 MG/DL (ref 70–200)
HBA1C MFR BLD: 5.6 % (ref 4.2–6)
HCO3 SERPL-SCNC: 23 MMOL/L (ref 22–32)
POTASSIUM SERPL-SCNC: 4.3 MMOL/L (ref 3.5–5.3)
SODIUM SERPL-SCNC: 139 MMOL/L (ref 136–146)

## 2020-02-12 PROCEDURE — 36415 COLL VENOUS BLD VENIPUNCTURE: CPT | Performed by: INTERNAL MEDICINE

## 2020-02-12 PROCEDURE — 80048 BASIC METABOLIC PNL TOTAL CA: CPT | Performed by: INTERNAL MEDICINE

## 2020-02-12 PROCEDURE — 83036 HEMOGLOBIN GLYCOSYLATED A1C: CPT | Performed by: INTERNAL MEDICINE

## 2020-02-19 ENCOUNTER — OFFICE VISIT (OUTPATIENT)
Dept: INTERNAL MEDICINE CLINIC | Facility: CLINIC | Age: 55
End: 2020-02-19
Payer: COMMERCIAL

## 2020-02-19 VITALS
RESPIRATION RATE: 16 BRPM | HEIGHT: 65.6 IN | SYSTOLIC BLOOD PRESSURE: 120 MMHG | DIASTOLIC BLOOD PRESSURE: 72 MMHG | WEIGHT: 267 LBS | HEART RATE: 84 BPM | BODY MASS INDEX: 43.43 KG/M2

## 2020-02-19 DIAGNOSIS — K21.00 GASTROESOPHAGEAL REFLUX DISEASE WITH ESOPHAGITIS: ICD-10-CM

## 2020-02-19 DIAGNOSIS — E66.01 MORBID OBESITY WITH BMI OF 40.0-44.9, ADULT (HCC): ICD-10-CM

## 2020-02-19 DIAGNOSIS — Z51.81 ENCOUNTER FOR THERAPEUTIC DRUG MONITORING: Primary | ICD-10-CM

## 2020-02-19 DIAGNOSIS — G43.909 MIGRAINE WITHOUT STATUS MIGRAINOSUS, NOT INTRACTABLE, UNSPECIFIED MIGRAINE TYPE: ICD-10-CM

## 2020-02-19 DIAGNOSIS — I10 ESSENTIAL HYPERTENSION: ICD-10-CM

## 2020-02-19 PROCEDURE — 99213 OFFICE O/P EST LOW 20 MIN: CPT | Performed by: NURSE PRACTITIONER

## 2020-02-19 RX ORDER — SIMETHICONE 125 MG
TABLET,CHEWABLE ORAL
COMMUNITY
Start: 2020-02-07 | End: 2020-03-23

## 2020-02-19 RX ORDER — LOPERAMIDE HCL 2 MG
TABLET ORAL
COMMUNITY
Start: 2020-02-07 | End: 2021-04-20

## 2020-02-19 NOTE — PATIENT INSTRUCTIONS
Continue making lifestyle changes that focus on good nutrition, regular exercise and stress management. Medication Plan: Continue current medication regimen.     Next steps to work on before next office visit include: Continue to be focused, inspired and

## 2020-02-19 NOTE — PROGRESS NOTES
Kirstin Osborn is a 49year old female presents today for 9 month follow-up on medical weight loss program for the treatment of overweight, obesity, or morbid obesity with associated HTN, OA, GERD, .    S:  Current weight Wt Readings from Last 6 Encounters BMI 43.62 kg/m²   GENERAL: well developed, well nourished, in no apparent distress, morbidly obese  EYES: conjunctiva pink, sclera non icteric, PERRLA  LUNGS: CTA in all fields, breathing non labored  CARDIO: RRR without murmur, normal S1 and S2 without cl and maintained their weight loss between # for over 1 plus years, most over 5 years now. • 80% of persons in the registry are women and 20% are men.   • The \"average\" woman is 39years of age and currently weighs 145 lbs, while the \"average\" man

## 2020-02-24 ENCOUNTER — TELEPHONE (OUTPATIENT)
Dept: GASTROENTEROLOGY | Age: 55
End: 2020-02-24

## 2020-02-27 ENCOUNTER — APPOINTMENT (OUTPATIENT)
Dept: GASTROENTEROLOGY | Age: 55
End: 2020-02-27
Attending: INTERNAL MEDICINE

## 2020-03-10 RX ORDER — LISINOPRIL 20 MG/1
TABLET ORAL
Qty: 90 TABLET | Refills: 1 | OUTPATIENT
Start: 2020-03-10

## 2020-03-19 DIAGNOSIS — Z51.81 ENCOUNTER FOR THERAPEUTIC DRUG MONITORING: ICD-10-CM

## 2020-03-19 DIAGNOSIS — E66.01 MORBID OBESITY WITH BMI OF 40.0-44.9, ADULT (HCC): ICD-10-CM

## 2020-03-23 ENCOUNTER — TELEPHONE (OUTPATIENT)
Dept: GASTROENTEROLOGY | Age: 55
End: 2020-03-23

## 2020-03-23 RX ORDER — PHENTERMINE HYDROCHLORIDE 37.5 MG/1
37.5 TABLET ORAL EVERY MORNING
Qty: 30 TABLET | Refills: 0 | Status: SHIPPED | OUTPATIENT
Start: 2020-03-23 | End: 2020-04-23

## 2020-03-30 ENCOUNTER — APPOINTMENT (OUTPATIENT)
Dept: GASTROENTEROLOGY | Age: 55
End: 2020-03-30
Attending: INTERNAL MEDICINE

## 2020-04-08 RX ORDER — BUTALBITAL, ACETAMINOPHEN AND CAFFEINE 50; 325; 40 MG/1; MG/1; MG/1
TABLET ORAL
Qty: 30 TABLET | Refills: 0 | Status: SHIPPED | OUTPATIENT
Start: 2020-04-08 | End: 2020-06-27 | Stop reason: SDUPTHER

## 2020-04-09 ENCOUNTER — APPOINTMENT (OUTPATIENT)
Dept: MAMMOGRAPHY | Age: 55
End: 2020-04-09
Attending: INTERNAL MEDICINE

## 2020-04-10 DIAGNOSIS — G43.909 MIGRAINE WITHOUT STATUS MIGRAINOSUS, NOT INTRACTABLE, UNSPECIFIED MIGRAINE TYPE: ICD-10-CM

## 2020-04-10 DIAGNOSIS — Z51.81 ENCOUNTER FOR THERAPEUTIC DRUG MONITORING: ICD-10-CM

## 2020-04-10 DIAGNOSIS — E66.01 MORBID OBESITY WITH BMI OF 40.0-44.9, ADULT (HCC): ICD-10-CM

## 2020-04-10 NOTE — TELEPHONE ENCOUNTER
Requesting topiramate 100 mg  LOV: 2/19/20  RTC: one month  Last Relevant Labs: na  Filled: 10/21/19 #180 with 1 refills    No future appointments. Pt cancelled 3/19 and 3/25. Do you want tele visit?

## 2020-04-17 DIAGNOSIS — F32.A DEPRESSION, UNSPECIFIED DEPRESSION TYPE: ICD-10-CM

## 2020-04-17 NOTE — TELEPHONE ENCOUNTER
I spoke with patient and she is free on Monday and Thursdays in the afternoons. Will you please send her a my chart (she will now check for it) and schedule her. Thank you!

## 2020-04-22 DIAGNOSIS — F32.A DEPRESSION, UNSPECIFIED DEPRESSION TYPE: ICD-10-CM

## 2020-04-22 RX ORDER — BUPROPION HYDROCHLORIDE 150 MG/1
TABLET ORAL
Qty: 90 TABLET | Refills: 0 | OUTPATIENT
Start: 2020-04-22

## 2020-04-22 RX ORDER — BUPROPION HYDROCHLORIDE 300 MG/1
TABLET ORAL
Qty: 30 TABLET | Refills: 0 | Status: SHIPPED | OUTPATIENT
Start: 2020-04-22 | End: 2020-06-27 | Stop reason: SDUPTHER

## 2020-04-23 ENCOUNTER — VIRTUAL PHONE E/M (OUTPATIENT)
Dept: INTERNAL MEDICINE CLINIC | Facility: CLINIC | Age: 55
End: 2020-04-23
Payer: COMMERCIAL

## 2020-04-23 DIAGNOSIS — I10 ESSENTIAL HYPERTENSION: ICD-10-CM

## 2020-04-23 DIAGNOSIS — G43.909 MIGRAINE WITHOUT STATUS MIGRAINOSUS, NOT INTRACTABLE, UNSPECIFIED MIGRAINE TYPE: ICD-10-CM

## 2020-04-23 DIAGNOSIS — E66.01 MORBID OBESITY WITH BMI OF 40.0-44.9, ADULT (HCC): ICD-10-CM

## 2020-04-23 DIAGNOSIS — F43.9 STRESS: ICD-10-CM

## 2020-04-23 DIAGNOSIS — Z51.81 ENCOUNTER FOR THERAPEUTIC DRUG MONITORING: Primary | ICD-10-CM

## 2020-04-23 DIAGNOSIS — K21.00 GASTROESOPHAGEAL REFLUX DISEASE WITH ESOPHAGITIS: ICD-10-CM

## 2020-04-23 PROCEDURE — 99213 OFFICE O/P EST LOW 20 MIN: CPT | Performed by: NURSE PRACTITIONER

## 2020-04-23 RX ORDER — FLUOXETINE HYDROCHLORIDE 20 MG/1
20 CAPSULE ORAL DAILY
Qty: 30 CAPSULE | Refills: 2 | Status: SHIPPED | OUTPATIENT
Start: 2020-04-23 | End: 2020-07-28

## 2020-04-23 RX ORDER — PHENTERMINE HYDROCHLORIDE 37.5 MG/1
37.5 TABLET ORAL EVERY MORNING
Qty: 30 TABLET | Refills: 1 | Status: SHIPPED | OUTPATIENT
Start: 2020-04-23 | End: 2020-08-26

## 2020-04-23 NOTE — PROGRESS NOTES
Virtual Telephone Check-In    Manolo Mcelroy verbally consents to a Virtual/Telephone Check-In visit on 4/23/2020. Patient understands and accepts financial responsibility for any deductible, co-insurance and/or co-pays associated with this service. FLUoxetine HCl 20 MG Oral Cap; Take 1 capsule (20 mg total) by mouth daily. Morbid obesity with BMI of 40.0-44.9, adult (HCC)  - CPM, aside from add fluoxetine for stress eating  -     Phentermine HCl 37.5 MG Oral Tab;  Take 1 tablet (37.5 mg total)

## 2020-04-23 NOTE — PATIENT INSTRUCTIONS
Thank you for taking the time for our virtual encounter today! Here are the next steps and plans we discussed:    1. Continue your medications, aside from add fluoxetine daily with option to increase dose after 3 weeks- can contact office.   2. Great job re cravings usually come on very suddenly.  When you are genuinely hungry, you may experience one or several of the symptoms listed below:  • Stomach pangs or growling   • Emptiness in the stomach   • Irritability   • Headache   • Low energy/fatigue   • Diffic ___________________________________________________________________  • Taken from the American Diabetes Association @ www.diabetes. org.  • Last Edited: March 19, 2014, reviewed December 17, 2013    Emotional Eating and Overeating   You have to know how t family and friends. 4. Be physically active. Exercise reduces stress and is a great mood enhancer too. So be sure you make time for regular physical activity. 5. Create new comforts. Make a list of healthy activities you enjoy.  And, whenever you feel the

## 2020-04-24 ENCOUNTER — APPOINTMENT (OUTPATIENT)
Dept: INTERNAL MEDICINE | Age: 55
End: 2020-04-24

## 2020-04-24 ENCOUNTER — E-ADVICE (OUTPATIENT)
Dept: INTERNAL MEDICINE | Age: 55
End: 2020-04-24

## 2020-04-24 ENCOUNTER — OFFICE VISIT (OUTPATIENT)
Dept: INTERNAL MEDICINE | Age: 55
End: 2020-04-24

## 2020-04-24 VITALS
BODY MASS INDEX: 44.84 KG/M2 | TEMPERATURE: 97.5 F | WEIGHT: 279 LBS | HEART RATE: 70 BPM | HEIGHT: 66 IN | SYSTOLIC BLOOD PRESSURE: 134 MMHG | DIASTOLIC BLOOD PRESSURE: 96 MMHG

## 2020-04-24 DIAGNOSIS — I10 ESSENTIAL HYPERTENSION: Primary | ICD-10-CM

## 2020-04-24 PROCEDURE — 99213 OFFICE O/P EST LOW 20 MIN: CPT | Performed by: INTERNAL MEDICINE

## 2020-04-24 RX ORDER — LISINOPRIL 20 MG/1
20 TABLET ORAL DAILY
Qty: 90 TABLET | Refills: 1 | Status: SHIPPED | OUTPATIENT
Start: 2020-04-24 | End: 2020-11-04 | Stop reason: SDUPTHER

## 2020-04-24 SDOH — HEALTH STABILITY: PHYSICAL HEALTH: ON AVERAGE, HOW MANY MINUTES DO YOU ENGAGE IN EXERCISE AT THIS LEVEL?: 60 MIN

## 2020-04-24 SDOH — HEALTH STABILITY: MENTAL HEALTH: HOW OFTEN DO YOU HAVE A DRINK CONTAINING ALCOHOL?: 2-4 TIMES A MONTH

## 2020-04-24 SDOH — HEALTH STABILITY: PHYSICAL HEALTH: ON AVERAGE, HOW MANY DAYS PER WEEK DO YOU ENGAGE IN MODERATE TO STRENUOUS EXERCISE (LIKE A BRISK WALK)?: 4 DAYS

## 2020-04-24 ASSESSMENT — PATIENT HEALTH QUESTIONNAIRE - PHQ9
1. LITTLE INTEREST OR PLEASURE IN DOING THINGS: NOT AT ALL
SUM OF ALL RESPONSES TO PHQ9 QUESTIONS 1 AND 2: 1
2. FEELING DOWN, DEPRESSED OR HOPELESS: SEVERAL DAYS
SUM OF ALL RESPONSES TO PHQ9 QUESTIONS 1 AND 2: 1

## 2020-04-28 DIAGNOSIS — Z51.81 ENCOUNTER FOR THERAPEUTIC DRUG MONITORING: ICD-10-CM

## 2020-04-28 DIAGNOSIS — G43.909 MIGRAINE WITHOUT STATUS MIGRAINOSUS, NOT INTRACTABLE, UNSPECIFIED MIGRAINE TYPE: ICD-10-CM

## 2020-04-28 DIAGNOSIS — E66.01 MORBID OBESITY WITH BMI OF 40.0-44.9, ADULT (HCC): ICD-10-CM

## 2020-04-28 NOTE — TELEPHONE ENCOUNTER
Requesting Topiramate  LOV: 4/23/20  RTC: 2 months  Last Relevant Labs: na  Filled: 10/21/19 #180 with 1 refills    No future appointments.     Pended for approval

## 2020-04-29 RX ORDER — TOPIRAMATE 100 MG/1
100 TABLET, FILM COATED ORAL 2 TIMES DAILY
Qty: 180 TABLET | Refills: 0 | Status: SHIPPED | OUTPATIENT
Start: 2020-04-29 | End: 2020-07-28

## 2020-05-13 ENCOUNTER — TELEPHONE (OUTPATIENT)
Dept: GASTROENTEROLOGY | Age: 55
End: 2020-05-13

## 2020-05-18 ENCOUNTER — APPOINTMENT (OUTPATIENT)
Dept: MAMMOGRAPHY | Age: 55
End: 2020-05-18
Attending: INTERNAL MEDICINE

## 2020-05-19 ENCOUNTER — APPOINTMENT (OUTPATIENT)
Dept: GASTROENTEROLOGY | Age: 55
End: 2020-05-19
Attending: INTERNAL MEDICINE

## 2020-06-20 ENCOUNTER — APPOINTMENT (OUTPATIENT)
Dept: MAMMOGRAPHY | Age: 55
End: 2020-06-20
Attending: INTERNAL MEDICINE

## 2020-06-27 DIAGNOSIS — F32.A DEPRESSION, UNSPECIFIED DEPRESSION TYPE: ICD-10-CM

## 2020-06-28 RX ORDER — BUPROPION HYDROCHLORIDE 300 MG/1
300 TABLET ORAL EVERY MORNING
Qty: 90 TABLET | Refills: 0 | Status: SHIPPED | OUTPATIENT
Start: 2020-06-28 | End: 2020-11-04 | Stop reason: SDUPTHER

## 2020-06-28 RX ORDER — PANTOPRAZOLE SODIUM 40 MG/1
40 TABLET, DELAYED RELEASE ORAL DAILY
Qty: 90 TABLET | Refills: 2 | Status: SHIPPED | OUTPATIENT
Start: 2020-06-28 | End: 2021-10-08 | Stop reason: SDUPTHER

## 2020-06-29 RX ORDER — BUTALBITAL, ACETAMINOPHEN AND CAFFEINE 50; 325; 40 MG/1; MG/1; MG/1
1 TABLET ORAL EVERY 6 HOURS PRN
Qty: 30 TABLET | Refills: 0 | Status: SHIPPED | OUTPATIENT
Start: 2020-06-29 | End: 2020-11-04 | Stop reason: SDUPTHER

## 2020-07-06 ENCOUNTER — TELEPHONE (OUTPATIENT)
Dept: GASTROENTEROLOGY | Age: 55
End: 2020-07-06

## 2020-07-21 ENCOUNTER — APPOINTMENT (OUTPATIENT)
Dept: GASTROENTEROLOGY | Age: 55
End: 2020-07-21
Attending: INTERNAL MEDICINE

## 2020-07-25 DIAGNOSIS — Z51.81 ENCOUNTER FOR THERAPEUTIC DRUG MONITORING: ICD-10-CM

## 2020-07-25 DIAGNOSIS — G43.909 MIGRAINE WITHOUT STATUS MIGRAINOSUS, NOT INTRACTABLE, UNSPECIFIED MIGRAINE TYPE: ICD-10-CM

## 2020-07-25 DIAGNOSIS — E66.01 MORBID OBESITY WITH BMI OF 40.0-44.9, ADULT (HCC): ICD-10-CM

## 2020-07-27 NOTE — TELEPHONE ENCOUNTER
Requesting Topiramate 100 mg  LOV: 4/23/20  RTC: 2 months  Last Relevant Labs: na  Filled: 4/29/20 #180 with 0 refills    No future appointments. Due for a visit. My chart sent.

## 2020-07-27 NOTE — TELEPHONE ENCOUNTER
Patient is now scheduled    Future Appointments   Date Time Provider Ole Llanes   8/26/2020  2:00 PM KATERIN Hogan [address]

## 2020-07-28 DIAGNOSIS — E66.01 MORBID OBESITY WITH BMI OF 40.0-44.9, ADULT (HCC): ICD-10-CM

## 2020-07-28 DIAGNOSIS — Z51.81 ENCOUNTER FOR THERAPEUTIC DRUG MONITORING: ICD-10-CM

## 2020-07-28 DIAGNOSIS — F43.9 STRESS: ICD-10-CM

## 2020-07-28 RX ORDER — TOPIRAMATE 100 MG/1
TABLET, FILM COATED ORAL
Qty: 180 TABLET | Refills: 0 | Status: SHIPPED | OUTPATIENT
Start: 2020-07-28 | End: 2020-10-27

## 2020-07-28 NOTE — TELEPHONE ENCOUNTER
Pharmacy sent request for 90 day of fluoxetine stating it will not be covered unless 90 day.     Requesting Fluoxetine 20 mg  LOV: 4/23/20  RTC: 2 months  Last Relevant Labs: na  Filled: 4/23/20 #30 with 2 refills    Future Appointments   Date Time Provider

## 2020-07-29 RX ORDER — FLUOXETINE HYDROCHLORIDE 20 MG/1
20 CAPSULE ORAL DAILY
Qty: 90 CAPSULE | Refills: 0 | Status: SHIPPED | OUTPATIENT
Start: 2020-07-29 | End: 2020-08-26

## 2020-08-25 NOTE — PROGRESS NOTES
Anuradha Short is a 48year old female presents today for follow-up on medical weight loss program for the treatment of overweight, obesity, or morbid obesity with associated HTN, OA, GERD, .    S:  Current weight Wt Readings from Last 6 Encounters:  08/26 GENERAL: well developed, well nourished, in no apparent distress, morbidly obese  EYES: conjunctiva pink, sclera non icteric, PERRLA  LUNGS: CTA in all fields, breathing non labored  CARDIO: RRR without murmur, normal S1 and S2 without clicks or gallops, n Next steps to work on before next office visit include: Glad to have you back and ready to focus on self care! I recommend the kiarra Motivation (picture of \") to help provide daily support and encouragement.  I also recommend to journal daily- one option is · Feeling tired, weak, or low in energy  · Having trouble focusing, remembering, or making decisions  · Feeling angry or agitated can be a sign as well. This may be the only sign more common in men.    Date Last Reviewed: 3/1/2017  © [id number] The [employer]Well

## 2020-08-26 ENCOUNTER — OFFICE VISIT (OUTPATIENT)
Dept: INTERNAL MEDICINE CLINIC | Facility: CLINIC | Age: 55
End: 2020-08-26
Payer: COMMERCIAL

## 2020-08-26 VITALS
WEIGHT: 293 LBS | SYSTOLIC BLOOD PRESSURE: 130 MMHG | BODY MASS INDEX: 48.23 KG/M2 | DIASTOLIC BLOOD PRESSURE: 90 MMHG | RESPIRATION RATE: 16 BRPM | HEART RATE: 90 BPM | HEIGHT: 65.5 IN

## 2020-08-26 DIAGNOSIS — F43.9 STRESS: ICD-10-CM

## 2020-08-26 DIAGNOSIS — K21.00 GASTROESOPHAGEAL REFLUX DISEASE WITH ESOPHAGITIS: ICD-10-CM

## 2020-08-26 DIAGNOSIS — Z51.81 ENCOUNTER FOR THERAPEUTIC DRUG MONITORING: Primary | ICD-10-CM

## 2020-08-26 DIAGNOSIS — I10 ESSENTIAL HYPERTENSION: ICD-10-CM

## 2020-08-26 DIAGNOSIS — E66.01 MORBID OBESITY WITH BMI OF 40.0-44.9, ADULT (HCC): ICD-10-CM

## 2020-08-26 DIAGNOSIS — M17.0 OSTEOARTHRITIS OF BOTH KNEES, UNSPECIFIED OSTEOARTHRITIS TYPE: ICD-10-CM

## 2020-08-26 PROCEDURE — 3008F BODY MASS INDEX DOCD: CPT | Performed by: NURSE PRACTITIONER

## 2020-08-26 PROCEDURE — 3080F DIAST BP >= 90 MM HG: CPT | Performed by: NURSE PRACTITIONER

## 2020-08-26 PROCEDURE — 99213 OFFICE O/P EST LOW 20 MIN: CPT | Performed by: NURSE PRACTITIONER

## 2020-08-26 PROCEDURE — 3075F SYST BP GE 130 - 139MM HG: CPT | Performed by: NURSE PRACTITIONER

## 2020-08-26 RX ORDER — PHENTERMINE HYDROCHLORIDE 37.5 MG/1
37.5 TABLET ORAL EVERY MORNING
Qty: 30 TABLET | Refills: 0 | Status: SHIPPED | OUTPATIENT
Start: 2020-08-26 | End: 2020-10-14

## 2020-08-26 RX ORDER — FLUOXETINE HYDROCHLORIDE 40 MG/1
40 CAPSULE ORAL DAILY
Qty: 90 CAPSULE | Refills: 1 | Status: SHIPPED | OUTPATIENT
Start: 2020-08-26 | End: 2021-01-27

## 2020-08-26 NOTE — PATIENT INSTRUCTIONS
Continue making lifestyle changes that focus on good nutrition, regular exercise and stress management. Medication Plan: Continue current medication regimen, aside from increase Fluoxetine to 40 mg daily- new script at pharmacy.  Look into coverage for S most of the time  · Feeling guilty or helpless  · Losing pleasure in things you used to enjoy, like reading, exercise, or social events  · Sleeping less or more than normal  · Having a big rise or fall in appetite or weight  · Feeling restless or irritable

## 2020-08-29 ENCOUNTER — TELEPHONE (OUTPATIENT)
Dept: GASTROENTEROLOGY | Age: 55
End: 2020-08-29

## 2020-09-14 ENCOUNTER — APPOINTMENT (OUTPATIENT)
Dept: MAMMOGRAPHY | Age: 55
End: 2020-09-14
Attending: INTERNAL MEDICINE

## 2020-10-08 ENCOUNTER — APPOINTMENT (OUTPATIENT)
Dept: GASTROENTEROLOGY | Age: 55
End: 2020-10-08
Attending: INTERNAL MEDICINE

## 2020-10-12 DIAGNOSIS — F32.A DEPRESSION, UNSPECIFIED DEPRESSION TYPE: ICD-10-CM

## 2020-10-12 DIAGNOSIS — E66.01 MORBID OBESITY WITH BMI OF 40.0-44.9, ADULT (HCC): ICD-10-CM

## 2020-10-12 DIAGNOSIS — Z51.81 ENCOUNTER FOR THERAPEUTIC DRUG MONITORING: ICD-10-CM

## 2020-10-13 ENCOUNTER — IMAGING SERVICES (OUTPATIENT)
Dept: MAMMOGRAPHY | Age: 55
End: 2020-10-13
Attending: INTERNAL MEDICINE

## 2020-10-13 DIAGNOSIS — Z12.31 ENCOUNTER FOR SCREENING MAMMOGRAM FOR MALIGNANT NEOPLASM OF BREAST: ICD-10-CM

## 2020-10-13 PROCEDURE — 77067 SCR MAMMO BI INCL CAD: CPT | Performed by: RADIOLOGY

## 2020-10-13 PROCEDURE — 77063 BREAST TOMOSYNTHESIS BI: CPT | Performed by: RADIOLOGY

## 2020-10-13 NOTE — TELEPHONE ENCOUNTER
Future Appointments   Date Time Provider Ole Llanes   10/20/2020  2:40 PM KATERIN Pittman [address]

## 2020-10-13 NOTE — TELEPHONE ENCOUNTER
Requesting Phentermine 37.5 mg  LOV: 8/26/20  RTC: one month  Last Relevant Labs: na  Filled: 8/26/20 #30 with 0 refills  [place]    No future appointments. Overdue for visit. My chart sent.

## 2020-10-14 RX ORDER — PHENTERMINE HYDROCHLORIDE 37.5 MG/1
37.5 TABLET ORAL EVERY MORNING
Qty: 30 TABLET | Refills: 0 | Status: SHIPPED | OUTPATIENT
Start: 2020-10-14 | End: 2020-10-27

## 2020-10-15 ENCOUNTER — IMAGING SERVICES (OUTPATIENT)
Dept: MAMMOGRAPHY | Age: 55
End: 2020-10-15
Attending: INTERNAL MEDICINE

## 2020-10-15 DIAGNOSIS — Z86.010 PERSONAL HISTORY OF COLONIC POLYPS: ICD-10-CM

## 2020-10-15 DIAGNOSIS — R92.8 ABNORMAL MAMMOGRAM: ICD-10-CM

## 2020-10-15 DIAGNOSIS — R92.1 BREAST CALCIFICATIONS: ICD-10-CM

## 2020-10-15 DIAGNOSIS — R92.8 ABNORMAL MAMMOGRAM: Primary | ICD-10-CM

## 2020-10-15 DIAGNOSIS — D12.5 BENIGN NEOPLASM OF SIGMOID COLON: ICD-10-CM

## 2020-10-15 DIAGNOSIS — K20.80 OTHER ESOPHAGITIS WITHOUT BLEEDING: ICD-10-CM

## 2020-10-15 DIAGNOSIS — K22.70 BARRETT'S ESOPHAGUS WITHOUT DYSPLASIA: ICD-10-CM

## 2020-10-15 DIAGNOSIS — K29.60 OTHER GASTRITIS WITHOUT BLEEDING: ICD-10-CM

## 2020-10-15 PROCEDURE — 77065 DX MAMMO INCL CAD UNI: CPT | Performed by: RADIOLOGY

## 2020-10-22 RX ORDER — BUPROPION HYDROCHLORIDE 300 MG/1
TABLET ORAL
Qty: 90 TABLET | Refills: 0 | OUTPATIENT
Start: 2020-10-22

## 2020-10-22 RX ORDER — BUTALBITAL, ACETAMINOPHEN AND CAFFEINE 50; 325; 40 MG/1; MG/1; MG/1
1 TABLET ORAL EVERY 6 HOURS PRN
Qty: 30 TABLET | Refills: 0 | OUTPATIENT
Start: 2020-10-22

## 2020-10-27 ENCOUNTER — OFFICE VISIT (OUTPATIENT)
Dept: INTERNAL MEDICINE CLINIC | Facility: CLINIC | Age: 55
End: 2020-10-27
Payer: COMMERCIAL

## 2020-10-27 VITALS
RESPIRATION RATE: 16 BRPM | SYSTOLIC BLOOD PRESSURE: 124 MMHG | WEIGHT: 293 LBS | HEART RATE: 76 BPM | BODY MASS INDEX: 48.23 KG/M2 | DIASTOLIC BLOOD PRESSURE: 82 MMHG | HEIGHT: 65.5 IN

## 2020-10-27 DIAGNOSIS — Z51.81 ENCOUNTER FOR THERAPEUTIC DRUG MONITORING: Primary | ICD-10-CM

## 2020-10-27 DIAGNOSIS — E66.01 MORBID OBESITY WITH BMI OF 40.0-44.9, ADULT (HCC): ICD-10-CM

## 2020-10-27 DIAGNOSIS — I10 ESSENTIAL HYPERTENSION: ICD-10-CM

## 2020-10-27 DIAGNOSIS — G43.909 MIGRAINE WITHOUT STATUS MIGRAINOSUS, NOT INTRACTABLE, UNSPECIFIED MIGRAINE TYPE: ICD-10-CM

## 2020-10-27 PROCEDURE — 3008F BODY MASS INDEX DOCD: CPT | Performed by: NURSE PRACTITIONER

## 2020-10-27 PROCEDURE — 3079F DIAST BP 80-89 MM HG: CPT | Performed by: NURSE PRACTITIONER

## 2020-10-27 PROCEDURE — 99214 OFFICE O/P EST MOD 30 MIN: CPT | Performed by: NURSE PRACTITIONER

## 2020-10-27 PROCEDURE — 3074F SYST BP LT 130 MM HG: CPT | Performed by: NURSE PRACTITIONER

## 2020-10-27 RX ORDER — TOPIRAMATE 100 MG/1
100 TABLET, FILM COATED ORAL 2 TIMES DAILY
Qty: 180 TABLET | Refills: 1 | Status: SHIPPED | OUTPATIENT
Start: 2020-10-27 | End: 2021-04-20

## 2020-10-27 RX ORDER — PHENTERMINE HYDROCHLORIDE 37.5 MG/1
37.5 TABLET ORAL EVERY MORNING
Qty: 30 TABLET | Refills: 0 | Status: SHIPPED | OUTPATIENT
Start: 2020-10-27 | End: 2020-11-30

## 2020-10-27 NOTE — PROGRESS NOTES
Karen Langley is a 48year old female presents today for follow-up on medical weight loss program for the treatment of overweight, obesity, or morbid obesity with associated HTN, OA, GERD, .    S:  Current weight Wt Readings from Last 6 Encounters:  10/27 LUNGS: CTA in all fields, breathing non labored  CARDIO: RRR without murmur, normal S1 and S2 without clicks or gallops, no pedal edema.   GI: +BS  NEURO/MS: motor and sensory grossly intact  PSYCH: pleasant, cooperative, normal mood and affect    ASSESSMEN Is excess weight affecting your life and your health? Bariatric surgery (also called obesity surgery) may help you reach a healthier weight. This surgery alters your digestive system. For the surgery to work, you must change your diet and lifestyle.  In mos Obesity is measured by a formula called body mass index (BMI), which is based on your height and weight. A healthy BMI is about 18 to 25. A BMI of 30 or more signals obesity. A BMI of 40 or more reflects severe (morbid) obesity.    Resources  · American Soc

## 2020-10-27 NOTE — PATIENT INSTRUCTIONS
Continue making lifestyle changes that focus on good nutrition, regular exercise and stress management. Medication Plan: Continue current medication regimen.     Next steps to work on before next office visit include: Reviewed bariatric surgery option wi weight is lost steadily during the first year or two after surgery. · Most likely, you won’t lose all your excess weight. But you can reach a much healthier weight.   Obesity is measured by a formula called body mass index (BMI), which is based on your hei

## 2020-10-31 DIAGNOSIS — I10 ESSENTIAL HYPERTENSION: ICD-10-CM

## 2020-11-04 ENCOUNTER — OFFICE VISIT (OUTPATIENT)
Dept: INTERNAL MEDICINE | Age: 55
End: 2020-11-04

## 2020-11-04 VITALS
SYSTOLIC BLOOD PRESSURE: 110 MMHG | TEMPERATURE: 97.1 F | BODY MASS INDEX: 45.03 KG/M2 | HEART RATE: 77 BPM | RESPIRATION RATE: 20 BRPM | DIASTOLIC BLOOD PRESSURE: 72 MMHG | OXYGEN SATURATION: 99 % | HEIGHT: 66 IN

## 2020-11-04 DIAGNOSIS — F32.A DEPRESSION, UNSPECIFIED DEPRESSION TYPE: ICD-10-CM

## 2020-11-04 DIAGNOSIS — I10 ESSENTIAL HYPERTENSION: Primary | ICD-10-CM

## 2020-11-04 PROCEDURE — 99214 OFFICE O/P EST MOD 30 MIN: CPT | Performed by: INTERNAL MEDICINE

## 2020-11-04 PROCEDURE — 3074F SYST BP LT 130 MM HG: CPT | Performed by: INTERNAL MEDICINE

## 2020-11-04 PROCEDURE — 3078F DIAST BP <80 MM HG: CPT | Performed by: INTERNAL MEDICINE

## 2020-11-04 RX ORDER — FLUOXETINE HYDROCHLORIDE 40 MG/1
40 CAPSULE ORAL
COMMUNITY
Start: 2020-08-26 | End: 2021-10-29 | Stop reason: ALTCHOICE

## 2020-11-04 RX ORDER — LISINOPRIL 20 MG/1
20 TABLET ORAL DAILY
Qty: 90 TABLET | Refills: 1 | Status: SHIPPED | OUTPATIENT
Start: 2020-11-04 | End: 2021-04-30

## 2020-11-04 RX ORDER — BUPROPION HYDROCHLORIDE 300 MG/1
300 TABLET ORAL EVERY MORNING
Qty: 90 TABLET | Refills: 1 | Status: SHIPPED | OUTPATIENT
Start: 2020-11-04 | End: 2021-04-30

## 2020-11-04 RX ORDER — BUTALBITAL, ACETAMINOPHEN AND CAFFEINE 50; 325; 40 MG/1; MG/1; MG/1
1 TABLET ORAL EVERY 6 HOURS PRN
Qty: 30 TABLET | Refills: 0 | Status: SHIPPED | OUTPATIENT
Start: 2020-11-04 | End: 2021-04-09

## 2020-11-04 ASSESSMENT — PATIENT HEALTH QUESTIONNAIRE - PHQ9
SUM OF ALL RESPONSES TO PHQ9 QUESTIONS 1 AND 2: 2
1. LITTLE INTEREST OR PLEASURE IN DOING THINGS: SEVERAL DAYS
SUM OF ALL RESPONSES TO PHQ9 QUESTIONS 1 AND 2: 2
CLINICAL INTERPRETATION OF PHQ2 SCORE: NO FURTHER SCREENING NEEDED
2. FEELING DOWN, DEPRESSED OR HOPELESS: SEVERAL DAYS
CLINICAL INTERPRETATION OF PHQ9 SCORE: NO FURTHER SCREENING NEEDED

## 2020-11-05 RX ORDER — LISINOPRIL 20 MG/1
TABLET ORAL
Qty: 90 TABLET | Refills: 1 | OUTPATIENT
Start: 2020-11-05

## 2020-11-10 ENCOUNTER — OFFICE VISIT (OUTPATIENT)
Dept: SURGERY | Age: 55
End: 2020-11-10

## 2020-11-10 VITALS — BODY MASS INDEX: 47.09 KG/M2 | HEIGHT: 66 IN | WEIGHT: 293 LBS

## 2020-11-10 DIAGNOSIS — E66.01 CLASS 3 SEVERE OBESITY DUE TO EXCESS CALORIES WITH BODY MASS INDEX (BMI) OF 40.0 TO 44.9 IN ADULT, UNSPECIFIED WHETHER SERIOUS COMORBIDITY PRESENT (CMD): ICD-10-CM

## 2020-11-10 DIAGNOSIS — D48.62 NEOPLASM OF UNCERTAIN BEHAVIOR OF LEFT BREAST: ICD-10-CM

## 2020-11-10 DIAGNOSIS — R92.0 MAMMOGRAPHIC MICROCALCIFICATION: Primary | ICD-10-CM

## 2020-11-10 PROCEDURE — 99203 OFFICE O/P NEW LOW 30 MIN: CPT | Performed by: SURGERY

## 2020-11-11 DIAGNOSIS — R92.0 MAMMOGRAPHIC MICROCALCIFICATION: Primary | ICD-10-CM

## 2020-11-19 ENCOUNTER — TELEPHONE (OUTPATIENT)
Dept: GASTROENTEROLOGY | Age: 55
End: 2020-11-19

## 2020-11-25 ENCOUNTER — APPOINTMENT (OUTPATIENT)
Dept: GASTROENTEROLOGY | Age: 55
End: 2020-11-25
Attending: INTERNAL MEDICINE

## 2020-11-30 ENCOUNTER — TELEMEDICINE (OUTPATIENT)
Dept: INTERNAL MEDICINE CLINIC | Facility: CLINIC | Age: 55
End: 2020-11-30

## 2020-11-30 DIAGNOSIS — F43.9 STRESS: ICD-10-CM

## 2020-11-30 DIAGNOSIS — E66.01 MORBID OBESITY WITH BMI OF 40.0-44.9, ADULT (HCC): ICD-10-CM

## 2020-11-30 DIAGNOSIS — K21.00 GASTROESOPHAGEAL REFLUX DISEASE WITH ESOPHAGITIS, UNSPECIFIED WHETHER HEMORRHAGE: ICD-10-CM

## 2020-11-30 DIAGNOSIS — Z51.81 ENCOUNTER FOR THERAPEUTIC DRUG MONITORING: Primary | ICD-10-CM

## 2020-11-30 DIAGNOSIS — I10 ESSENTIAL HYPERTENSION: ICD-10-CM

## 2020-11-30 PROCEDURE — 99213 OFFICE O/P EST LOW 20 MIN: CPT | Performed by: NURSE PRACTITIONER

## 2020-11-30 RX ORDER — PHENTERMINE HYDROCHLORIDE 37.5 MG/1
37.5 TABLET ORAL EVERY MORNING
Qty: 30 TABLET | Refills: 1 | Status: SHIPPED | OUTPATIENT
Start: 2020-11-30 | End: 2021-01-27

## 2020-11-30 NOTE — PATIENT INSTRUCTIONS
Continue making lifestyle changes that focus on good nutrition, regular exercise and stress management. Medication Plan: Continue current medication regimen. Next steps to work on before next office visit include: Ritous Cage work!  Think about your goals f ? Quitting smoking  Social  Healthy eating and physical activity can be affected by many social and habitual factors:  ? College  ? Marriage  ? Children  ? Jobs and commute time  ? senior living  ? Family holidays  ? Neighborhood environment  ?  Income level

## 2020-11-30 NOTE — PROGRESS NOTES
Virtual Telephone Check-In    Samir Centeno verbally consents to a Virtual/Telephone Check-In visit on 11/30/2020. Patient understands and accepts financial responsibility for any deductible, co-insurance and/or co-pays associated with this service. -     Phentermine HCl 37.5 MG Oral Tab; Take 1 tablet (37.5 mg total) by mouth every morning. Morbid obesity with BMI of 40.0-44.9, adult ([facility]  - CPM  -  on the impact life events has on weight  -     Phentermine HCl 37.5 MG Oral Tab;  Take 1 tab One theory from [employer], known as the life course perspective, would encourage you to reflect on your weight by looking at how you have grown and changed over time.  This perspective suggests that the patterns in your weight over time are due to By looking at the events in your life that have shaped your thoughts and circumstances, you can learn from your past and use those lessons to help you manage your weight and your health as a whole.  This may also help you achieve greater self-awareness and

## 2020-12-03 ENCOUNTER — EXTERNAL RECORD (OUTPATIENT)
Dept: HEALTH INFORMATION MANAGEMENT | Facility: OTHER | Age: 55
End: 2020-12-03

## 2020-12-03 ENCOUNTER — APPOINTMENT (OUTPATIENT)
Dept: OTHER | Facility: PHYSICIAN GROUP | Age: 55
End: 2020-12-03

## 2020-12-03 PROCEDURE — 19081 BX BREAST 1ST LESION STRTCTC: CPT | Performed by: SURGERY

## 2020-12-29 ENCOUNTER — DOCUMENTATION ONLY (OUTPATIENT)
Dept: SURGERY | Facility: CLINIC | Age: 55
End: 2020-12-29

## 2020-12-29 ENCOUNTER — OFFICE VISIT (OUTPATIENT)
Dept: SURGERY | Facility: CLINIC | Age: 55
End: 2020-12-29
Payer: COMMERCIAL

## 2020-12-29 VITALS
OXYGEN SATURATION: 95 % | DIASTOLIC BLOOD PRESSURE: 97 MMHG | BODY MASS INDEX: 47.65 KG/M2 | SYSTOLIC BLOOD PRESSURE: 135 MMHG | HEART RATE: 83 BPM | HEIGHT: 65.8 IN | WEIGHT: 293 LBS

## 2020-12-29 NOTE — PROGRESS NOTES
Oriented pt to the bariatric program; provided/reviewed bariatric packet of info, time line, referrals,etc; pt agreed and verbalized understanding; attended seminar on 11/16/2020.

## 2020-12-29 NOTE — PROGRESS NOTES
[address], [address]  Dept: [phone]    12/29/2020  Bariatric New Patient Evaluation    Chief Complaint: Obesity    History of Present Illness:   Yesika Bradford Migraine        Past Surgical History:    Past Surgical History:   Procedure Laterality Date   • CHOLECYSTECTOMY     • HERNIA SURGERY     • OTHER SURGICAL HISTORY      right knee meniscus repair    • TUBAL LIGATION         Family History:    Family History Pantoprazole Sodium 40 MG Oral Tab EC, Take 40 mg by mouth once daily. , Disp: , Rfl: 1     Allergies:  No Known Allergies    ROS:  Neg x12    Physical Exam:  Vital Signs:  BP (!) 135/97   Pulse 83   Ht 65.8\"   Wt (!) 300 lb 1.6 oz (136.1 kg)   LMP  (LMP U

## 2021-01-12 ENCOUNTER — TELEPHONE (OUTPATIENT)
Dept: INTERNAL MEDICINE CLINIC | Facility: CLINIC | Age: 56
End: 2021-01-12

## 2021-01-12 DIAGNOSIS — E66.01 MORBID OBESITY WITH BMI OF 40.0-44.9, ADULT (HCC): ICD-10-CM

## 2021-01-12 DIAGNOSIS — I10 ESSENTIAL HYPERTENSION: Primary | ICD-10-CM

## 2021-01-12 NOTE — TELEPHONE ENCOUNTER
Kina Pérez RN Hi Amy,     Can you please enter a Dietitian order as patient is seeing Manpreet  tomorrow and last order done was in 2019. Remember to include any cardiovascular risk factor in addition to obesity dx.       D

## 2021-01-13 ENCOUNTER — OFFICE VISIT (OUTPATIENT)
Dept: INTERNAL MEDICINE CLINIC | Facility: CLINIC | Age: 56
End: 2021-01-13
Payer: COMMERCIAL

## 2021-01-13 VITALS — BODY MASS INDEX: 47.46 KG/M2 | HEIGHT: 65.8 IN | WEIGHT: 291.81 LBS

## 2021-01-13 DIAGNOSIS — E66.01 CLASS 3 SEVERE OBESITY DUE TO EXCESS CALORIES WITH SERIOUS COMORBIDITY AND BODY MASS INDEX (BMI) OF 45.0 TO 49.9 IN ADULT (HCC): Primary | ICD-10-CM

## 2021-01-13 PROCEDURE — 3008F BODY MASS INDEX DOCD: CPT | Performed by: DIETITIAN, REGISTERED

## 2021-01-13 PROCEDURE — 97802 MEDICAL NUTRITION INDIV IN: CPT | Performed by: DIETITIAN, REGISTERED

## 2021-01-13 NOTE — PROGRESS NOTES
[address] [place] [address] AND WEIGHT LOSS CLINIC  [address] [place] [address]  Dept: [phone]  Loc: [phone]    01/13/21    Bariatric Initial Nutrition Assessment    Lawrence Rich is a 48year old female.     Referr trigger to snack    Patient engages in binge-purge behavior: no    Patient uses laxatives or vomits as a means of purging: no    Patient complains of: none    Patient's motivation for bariatric surgery: \"I'm tired of my weight fluctuating\"    Allergies: Disp: 90 capsule, Rfl: 1  •  PEG-KCl-NaCl-NaSulf-Na Asc-C (PLENVU) 140 g Oral Recon Soln, Take 1 kit by mouth., Disp: , Rfl:   •  [employer]S GAS RELIEF EXTRA STRENGTH 125 MG Oral Chew Tab, SEE COLONOSCOPY INSTRUCTIONS., Disp: , Rfl:   •  lisinopril 20 MG Oral Tab in bariatric surgery. Pt reports struggling with weight in adulthood, has tried many different diets and medications in the past w/ little sustained success.  Pt currently on topamax and phentermine, feels good appetite and craving suppression and is overal requirements? Introduced, will reinforce  Patient understand fluid requirements (amount and method of intake)? Introduced, will reinforce  Patient understands post-operative diet? Will assess with quiz  Patient ready for Liquid Protein Education?  No    Chestine Scrape

## 2021-01-26 ENCOUNTER — TELEPHONE (OUTPATIENT)
Dept: SURGERY | Facility: CLINIC | Age: 56
End: 2021-01-26

## 2021-01-26 ENCOUNTER — OFFICE VISIT (OUTPATIENT)
Dept: SURGERY | Facility: CLINIC | Age: 56
End: 2021-01-26
Payer: COMMERCIAL

## 2021-01-26 VITALS
BODY MASS INDEX: 46.4 KG/M2 | DIASTOLIC BLOOD PRESSURE: 66 MMHG | HEIGHT: 65.8 IN | SYSTOLIC BLOOD PRESSURE: 104 MMHG | HEART RATE: 72 BPM | WEIGHT: 285.31 LBS

## 2021-01-26 NOTE — PROGRESS NOTES
[address]  Dept: [phone]    1/26/2021    BARIATRIC EXISTING PATIENT/FOLLOW UP    HPI:  Manolo Navi is a 48year old-year old female

## 2021-01-27 ENCOUNTER — OFFICE VISIT (OUTPATIENT)
Dept: INTERNAL MEDICINE CLINIC | Facility: CLINIC | Age: 56
End: 2021-01-27
Payer: COMMERCIAL

## 2021-01-27 VITALS
DIASTOLIC BLOOD PRESSURE: 60 MMHG | SYSTOLIC BLOOD PRESSURE: 110 MMHG | RESPIRATION RATE: 14 BRPM | WEIGHT: 284 LBS | HEIGHT: 65.5 IN | HEART RATE: 78 BPM | BODY MASS INDEX: 46.75 KG/M2

## 2021-01-27 DIAGNOSIS — E66.01 MORBID OBESITY WITH BMI OF 40.0-44.9, ADULT (HCC): ICD-10-CM

## 2021-01-27 DIAGNOSIS — F43.9 STRESS: ICD-10-CM

## 2021-01-27 DIAGNOSIS — G43.909 MIGRAINE WITHOUT STATUS MIGRAINOSUS, NOT INTRACTABLE, UNSPECIFIED MIGRAINE TYPE: ICD-10-CM

## 2021-01-27 DIAGNOSIS — K21.00 GASTROESOPHAGEAL REFLUX DISEASE WITH ESOPHAGITIS, UNSPECIFIED WHETHER HEMORRHAGE: ICD-10-CM

## 2021-01-27 DIAGNOSIS — Z51.81 ENCOUNTER FOR THERAPEUTIC DRUG MONITORING: Primary | ICD-10-CM

## 2021-01-27 DIAGNOSIS — I10 ESSENTIAL HYPERTENSION: ICD-10-CM

## 2021-01-27 PROCEDURE — 3074F SYST BP LT 130 MM HG: CPT | Performed by: NURSE PRACTITIONER

## 2021-01-27 PROCEDURE — 99214 OFFICE O/P EST MOD 30 MIN: CPT | Performed by: NURSE PRACTITIONER

## 2021-01-27 PROCEDURE — 3078F DIAST BP <80 MM HG: CPT | Performed by: NURSE PRACTITIONER

## 2021-01-27 PROCEDURE — 3008F BODY MASS INDEX DOCD: CPT | Performed by: NURSE PRACTITIONER

## 2021-01-27 RX ORDER — FLUOXETINE HYDROCHLORIDE 40 MG/1
40 CAPSULE ORAL DAILY
Qty: 90 CAPSULE | Refills: 1 | Status: SHIPPED | OUTPATIENT
Start: 2021-01-27 | End: 2021-04-20

## 2021-01-27 RX ORDER — PHENTERMINE HYDROCHLORIDE 37.5 MG/1
37.5 TABLET ORAL EVERY MORNING
Qty: 30 TABLET | Refills: 1 | Status: SHIPPED | OUTPATIENT
Start: 2021-01-27 | End: 2021-04-20

## 2021-01-27 NOTE — PATIENT INSTRUCTIONS
Continue making lifestyle changes that focus on good nutrition, regular exercise and stress management. Medication Plan: Continue current medication regimen. Next steps to work on before next office visit include:     New year, new YOU!  Set your inte scale.  The Centers for Disease Control recommends at least 150 minutes of moderate-intensity aerobic activity every week, plus muscle-strengthening activities at least two days a week.   3 – And Also Functional Nutrition  Eating a healthier diet is also ab few steps you can take to set up your home for success. How to Build a [facility] of Less-healthy Food  These foods are okay in moderation, but they can be enjoyed outside of the house in a fun and empowering way.  Go thr foods in your kitchen such as fruit paired with nut butter, hummus and carrots, lunch meat and cheese roll-ups, etc. It may also help to keep pre-made freezer meals on-hand (something you've cooked in advance) for busy weeknights when you don't have time t  - on line meal delivery programs for preparation at home  · Meal Village in [address] for homemade meals to go @ www.eParachute  · Diet Doctor @ www. dietdoctor. com - low carb swaps  · Yummly - meal prep and planning kiarra    Stress Management/Behavior/

## 2021-01-27 NOTE — PROGRESS NOTES
Adeola Haji is a 48year old female presents today for follow-up on medical weight loss program for the treatment of overweight, obesity, or morbid obesity with associated HTN, OA, GERD, .    S:  Current weight Wt Readings from Last 6 Encounters:  01/27 284 lb (128.8 kg)   LMP  (LMP Unknown)   BMI 46.54 kg/m²   GENERAL: well developed, well nourished, in no apparent distress, morbidly obese  EYES: conjunctiva pink, sclera non icteric, PERRLA  LUNGS: CTA in all fields, breathing non labored  CARDIO: RRR wi nutrition, regular exercise and stress management. Medication Plan: Continue current medication regimen. Next steps to work on before next office visit include:     New year, new YOU! Set your intentions for health and wellness in the new year.  Get b least 150 minutes of moderate-intensity aerobic activity every week, plus muscle-strengthening activities at least two days a week. 3 – And Also Functional Nutrition  Eating a healthier diet is also about more than just weight-loss.  Nutritious foods fuel How to Build a [facility] of Less-healthy Food  These foods are okay in moderation, but they can be enjoyed outside of the house in a fun and empowering way.  Go through your kitchen fridge, freezer, pantry, cabinets and butter, hummus and carrots, lunch meat and cheese roll-ups, etc. It may also help to keep pre-made freezer meals on-hand (something you've cooked in advance) for busy weeknights when you don't have time to prepare food.   Turning your home into a healthy fo at home  · Meal Village in [place] for homemade meals to go @ www.[employer]  · Diet Doctor @ www. dietdoctor. com - low carb swaps  · Yummly - meal prep and planning kiarra    Stress Management/Behavior/Mindful Eating  · CALM meditation kiarra  · Headspace  ·

## 2021-02-03 ENCOUNTER — OFFICE VISIT (OUTPATIENT)
Dept: INTERNAL MEDICINE | Age: 56
End: 2021-02-03

## 2021-02-03 DIAGNOSIS — R73.01 IMPAIRED FASTING GLUCOSE: ICD-10-CM

## 2021-02-03 DIAGNOSIS — Z00.00 ROUTINE GENERAL MEDICAL EXAMINATION AT HEALTH CARE FACILITY: Primary | ICD-10-CM

## 2021-02-03 DIAGNOSIS — Z11.51 SCREENING FOR HPV (HUMAN PAPILLOMAVIRUS): ICD-10-CM

## 2021-02-03 DIAGNOSIS — K63.5 POLYP OF COLON, UNSPECIFIED PART OF COLON, UNSPECIFIED TYPE: ICD-10-CM

## 2021-02-03 DIAGNOSIS — I10 ESSENTIAL HYPERTENSION: ICD-10-CM

## 2021-02-03 DIAGNOSIS — K22.719 BARRETT'S ESOPHAGUS WITH DYSPLASIA: ICD-10-CM

## 2021-02-03 DIAGNOSIS — Z01.419 ROUTINE GYNECOLOGICAL EXAMINATION: ICD-10-CM

## 2021-02-03 PROCEDURE — 3074F SYST BP LT 130 MM HG: CPT | Performed by: INTERNAL MEDICINE

## 2021-02-03 PROCEDURE — 88142 CYTOPATH C/V THIN LAYER: CPT | Performed by: PATHOLOGY

## 2021-02-03 PROCEDURE — 87624 HPV HI-RISK TYP POOLED RSLT: CPT | Performed by: CLINICAL MEDICAL LABORATORY

## 2021-02-03 PROCEDURE — 99396 PREV VISIT EST AGE 40-64: CPT | Performed by: INTERNAL MEDICINE

## 2021-02-03 PROCEDURE — 3078F DIAST BP <80 MM HG: CPT | Performed by: INTERNAL MEDICINE

## 2021-02-03 SDOH — HEALTH STABILITY: PHYSICAL HEALTH: ON AVERAGE, HOW MANY MINUTES DO YOU ENGAGE IN EXERCISE AT THIS LEVEL?: 60 MIN

## 2021-02-03 SDOH — HEALTH STABILITY: MENTAL HEALTH: HOW OFTEN DO YOU HAVE A DRINK CONTAINING ALCOHOL?: 2-4 TIMES A MONTH

## 2021-02-03 SDOH — HEALTH STABILITY: PHYSICAL HEALTH: ON AVERAGE, HOW MANY DAYS PER WEEK DO YOU ENGAGE IN MODERATE TO STRENUOUS EXERCISE (LIKE A BRISK WALK)?: 4 DAYS

## 2021-02-03 ASSESSMENT — PATIENT HEALTH QUESTIONNAIRE - PHQ9
CLINICAL INTERPRETATION OF PHQ2 SCORE: NO FURTHER SCREENING NEEDED
2. FEELING DOWN, DEPRESSED OR HOPELESS: NOT AT ALL
SUM OF ALL RESPONSES TO PHQ9 QUESTIONS 1 AND 2: 0
SUM OF ALL RESPONSES TO PHQ9 QUESTIONS 1 AND 2: 0
CLINICAL INTERPRETATION OF PHQ9 SCORE: NO FURTHER SCREENING NEEDED
1. LITTLE INTEREST OR PLEASURE IN DOING THINGS: NOT AT ALL

## 2021-02-03 ASSESSMENT — PAIN SCALES - GENERAL: PAINLEVEL: 0

## 2021-02-04 ENCOUNTER — LAB SERVICES (OUTPATIENT)
Dept: LAB | Age: 56
End: 2021-02-04

## 2021-02-04 DIAGNOSIS — R73.01 IMPAIRED FASTING GLUCOSE: ICD-10-CM

## 2021-02-04 DIAGNOSIS — I10 ESSENTIAL HYPERTENSION: ICD-10-CM

## 2021-02-04 DIAGNOSIS — Z00.00 ROUTINE GENERAL MEDICAL EXAMINATION AT HEALTH CARE FACILITY: ICD-10-CM

## 2021-02-04 LAB
ALBUMIN SERPL-MCNC: 4.1 G/DL (ref 3.6–5.1)
ALP SERPL-CCNC: 96 U/L (ref 45–130)
ALT SERPL W/O P-5'-P-CCNC: 18 U/L (ref 4–38)
AST SERPL-CCNC: 23 U/L (ref 14–43)
BASOPHIL %: 1.1 % (ref 0–1.2)
BASOPHIL ABSOLUTE #: 0.1 10*3/UL (ref 0–0.1)
BILIRUB SERPL-MCNC: 0.2 MG/DL (ref 0–1.3)
BUN SERPL-MCNC: 21 MG/DL (ref 7–20)
CALCIUM SERPL-MCNC: 9.4 MG/DL (ref 8.6–10.6)
CHLORIDE SERPL-SCNC: 108 MMOL/L (ref 96–107)
CHOLEST SERPL-MCNC: 217 MG/DL (ref 140–200)
CO2 SERPL-SCNC: 22 MMOL/L (ref 22–32)
CREAT SERPL-MCNC: 1.1 MG/DL (ref 0.5–1.4)
DIFFERENTIAL TYPE: ABNORMAL
EOSINOPHIL %: 3.6 % (ref 0–10)
EOSINOPHIL ABSOLUTE #: 0.2 10*3/UL (ref 0–0.5)
EST. AVERAGE GLUCOSE BLD GHB EST-MCNC: 115 MG/DL (ref 0–154)
GFR SERPL CREATININE-BSD FRML MDRD: 52 ML/MIN/{1.73M2}
GFR SERPL CREATININE-BSD FRML MDRD: >60 ML/MIN/{1.73M2}
GLUCOSE P FAST SERPL-MCNC: 84 MG/DL (ref 60–100)
HBA1C MFR BLD: 5.6 % (ref 4.2–6)
HDLC SERPL-MCNC: 62 MG/DL
HEMATOCRIT: 40.2 % (ref 34–45)
HEMOGLOBIN: 12.6 G/DL (ref 11.2–15.7)
IMMATURE GRANULOCYTE ABSOLUTE: 0.01 10*3/UL (ref 0–0.05)
IMMATURE GRANULOCYTE PERCENT: 0.2 % (ref 0–0.5)
LDLC SERPL CALC-MCNC: 141 MG/DL (ref 30–100)
LYMPH PERCENT: 37.3 % (ref 20.5–51.1)
LYMPHOCYTE ABSOLUTE #: 2.4 10*3/UL (ref 1.2–3.4)
MEAN CORPUSCULAR HGB CONCENTRATION: 31.3 % (ref 32–36)
MEAN CORPUSCULAR HGB: 29.6 PG (ref 27–34)
MEAN CORPUSCULAR VOLUME: 94.6 FL (ref 79–95)
MEAN PLATELET VOLUME: 9.2 FL (ref 8.6–12.4)
MONOCYTE ABSOLUTE #: 0.5 10*3/UL (ref 0.2–0.9)
MONOCYTE PERCENT: 7.6 % (ref 4.3–12.9)
NEUTROPHIL ABSOLUTE #: 3.2 10*3/UL (ref 1.4–6.5)
NEUTROPHIL PERCENT: 50.2 % (ref 34–73.5)
PLATELET COUNT: 407 10*3/UL (ref 150–400)
POTASSIUM SERPL-SCNC: 4.5 MMOL/L (ref 3.5–5.3)
PROT SERPL-MCNC: 7 G/DL (ref 6.4–8.5)
RED BLOOD CELL COUNT: 4.25 10*6/UL (ref 3.7–5.2)
RED CELL DISTRIBUTION WIDTH: 13.3 % (ref 11.3–14.8)
SODIUM SERPL-SCNC: 136 MMOL/L (ref 136–146)
TRIGL SERPL-MCNC: 68 MG/DL (ref 0–200)
TSH SERPL DL<=0.05 MIU/L-ACNC: 1.98 M[IU]/L (ref 0.3–4.82)
WHITE BLOOD CELL COUNT: 6.4 10*3/UL (ref 4–10)

## 2021-02-04 PROCEDURE — 36415 COLL VENOUS BLD VENIPUNCTURE: CPT | Performed by: INTERNAL MEDICINE

## 2021-02-04 PROCEDURE — 80061 LIPID PANEL: CPT | Performed by: INTERNAL MEDICINE

## 2021-02-04 PROCEDURE — 83036 HEMOGLOBIN GLYCOSYLATED A1C: CPT | Performed by: INTERNAL MEDICINE

## 2021-02-04 PROCEDURE — 80050 GENERAL HEALTH PANEL: CPT | Performed by: INTERNAL MEDICINE

## 2021-02-05 ENCOUNTER — TELEPHONE (OUTPATIENT)
Dept: INTERNAL MEDICINE | Age: 56
End: 2021-02-05

## 2021-02-11 ENCOUNTER — OFFICE VISIT (OUTPATIENT)
Dept: SURGERY | Facility: CLINIC | Age: 56
End: 2021-02-11
Payer: COMMERCIAL

## 2021-02-11 VITALS — HEIGHT: 65.5 IN | BODY MASS INDEX: 46.44 KG/M2 | WEIGHT: 282.13 LBS

## 2021-02-11 DIAGNOSIS — E66.01 CLASS 3 SEVERE OBESITY DUE TO EXCESS CALORIES WITH SERIOUS COMORBIDITY AND BODY MASS INDEX (BMI) OF 45.0 TO 49.9 IN ADULT (HCC): Primary | ICD-10-CM

## 2021-02-11 PROCEDURE — 3008F BODY MASS INDEX DOCD: CPT | Performed by: DIETITIAN, REGISTERED

## 2021-02-11 PROCEDURE — 0358T BIA WHOLE BODY: CPT | Performed by: DIETITIAN, REGISTERED

## 2021-02-11 PROCEDURE — 97803 MED NUTRITION INDIV SUBSEQ: CPT | Performed by: DIETITIAN, REGISTERED

## 2021-02-11 NOTE — PROGRESS NOTES
[address] [place] [address] AND WEIGHT LOSS CLINIC  [address] [place] [address]  Dept: [phone]  Loc: [phone]    02/11/21      Bariatric Follow-up Nutrition Session    Karen Shivam is a 48year old female.      Reno mg by mouth once daily. , Disp: , Rfl: 1  •  Butalbital-APAP-Caffeine -40 MG Oral Tab, Take 1 tablet by mouth as needed. , Disp: , Rfl:   •  buPROPion HCl ER, XL, 300 MG Oral Tablet 24 Hr, Take 1 tablet by mouth daily. , Disp: , Rfl:   •  omega-3 fatty results. Body Composition Analysis done today, has good upper body and trunk muscle mass at 114.4% for the right arm, 114.4% for the left arm, and 105% for the trunk.  Pt w/ poor lower body muscle mass at 86.7% for the right leg and 88.6% for the left le

## 2021-02-12 ENCOUNTER — ORDER TRANSCRIPTION (OUTPATIENT)
Dept: ADMINISTRATIVE | Facility: HOSPITAL | Age: 56
End: 2021-02-12

## 2021-02-12 ENCOUNTER — OFFICE VISIT (OUTPATIENT)
Dept: CARDIOLOGY | Age: 56
End: 2021-02-12

## 2021-02-12 ENCOUNTER — LAB REQUISITION (OUTPATIENT)
Dept: LAB | Age: 56
End: 2021-02-12

## 2021-02-12 VITALS
HEIGHT: 66 IN | BODY MASS INDEX: 45.32 KG/M2 | HEART RATE: 72 BPM | DIASTOLIC BLOOD PRESSURE: 72 MMHG | WEIGHT: 282 LBS | SYSTOLIC BLOOD PRESSURE: 100 MMHG

## 2021-02-12 DIAGNOSIS — Z01.810 PREOP CARDIOVASCULAR EXAM: ICD-10-CM

## 2021-02-12 DIAGNOSIS — Z00.00 ENCOUNTER FOR GENERAL ADULT MEDICAL EXAMINATION WITHOUT ABNORMAL FINDINGS: ICD-10-CM

## 2021-02-12 DIAGNOSIS — Z11.51 ENCOUNTER FOR SCREENING FOR HUMAN PAPILLOMAVIRUS (HPV): ICD-10-CM

## 2021-02-12 DIAGNOSIS — Z11.59 ENCOUNTER FOR SCREENING FOR OTHER VIRAL DISEASES: ICD-10-CM

## 2021-02-12 DIAGNOSIS — Z01.818 PREOP EXAMINATION: Primary | ICD-10-CM

## 2021-02-12 DIAGNOSIS — I10 ESSENTIAL HYPERTENSION: ICD-10-CM

## 2021-02-12 DIAGNOSIS — Z01.419 ENCOUNTER FOR GYNECOLOGICAL EXAMINATION (GENERAL) (ROUTINE) WITHOUT ABNORMAL FINDINGS: ICD-10-CM

## 2021-02-12 DIAGNOSIS — I10 ESSENTIAL (PRIMARY) HYPERTENSION: ICD-10-CM

## 2021-02-12 DIAGNOSIS — R73.01 IMPAIRED FASTING GLUCOSE: ICD-10-CM

## 2021-02-12 DIAGNOSIS — E66.01 CLASS 3 SEVERE OBESITY DUE TO EXCESS CALORIES WITH BODY MASS INDEX (BMI) OF 40.0 TO 44.9 IN ADULT, UNSPECIFIED WHETHER SERIOUS COMORBIDITY PRESENT (CMD): Primary | ICD-10-CM

## 2021-02-12 LAB — AP REPORT: NORMAL

## 2021-02-12 PROCEDURE — 93000 ELECTROCARDIOGRAM COMPLETE: CPT | Performed by: INTERNAL MEDICINE

## 2021-02-12 PROCEDURE — 99205 OFFICE O/P NEW HI 60 MIN: CPT | Performed by: INTERNAL MEDICINE

## 2021-02-12 ASSESSMENT — ENCOUNTER SYMPTOMS
BRUISES/BLEEDS EASILY: 0
FEVER: 0
HEMOPTYSIS: 0
COUGH: 0
ALLERGIC/IMMUNOLOGIC COMMENTS: NO NEW FOOD ALLERGIES
WEIGHT LOSS: 0
WEIGHT GAIN: 0
CHILLS: 0
SUSPICIOUS LESIONS: 0
HEMATOCHEZIA: 0

## 2021-02-12 ASSESSMENT — PATIENT HEALTH QUESTIONNAIRE - PHQ9
CLINICAL INTERPRETATION OF PHQ2 SCORE: NO FURTHER SCREENING NEEDED
SUM OF ALL RESPONSES TO PHQ9 QUESTIONS 1 AND 2: 0
CLINICAL INTERPRETATION OF PHQ9 SCORE: NO FURTHER SCREENING NEEDED
SUM OF ALL RESPONSES TO PHQ9 QUESTIONS 1 AND 2: 0
2. FEELING DOWN, DEPRESSED OR HOPELESS: NOT AT ALL
1. LITTLE INTEREST OR PLEASURE IN DOING THINGS: NOT AT ALL

## 2021-02-15 LAB
HPV16+18+45 E6+E7MRNA CVX NAA+PROBE: NEGATIVE
HPV16+18+45 E6+E7MRNA CVX NAA+PROBE: NEGATIVE
Lab: NORMAL
Lab: NORMAL

## 2021-02-16 ENCOUNTER — LAB ENCOUNTER (OUTPATIENT)
Dept: LAB | Age: 56
End: 2021-02-16
Attending: INTERNAL MEDICINE
Payer: COMMERCIAL

## 2021-02-16 DIAGNOSIS — Z11.59 ENCOUNTER FOR SCREENING FOR OTHER VIRAL DISEASES: ICD-10-CM

## 2021-02-16 DIAGNOSIS — Z01.818 PREOP EXAMINATION: ICD-10-CM

## 2021-02-17 LAB — SARS-COV-2 RNA RESP QL NAA+PROBE: NOT DETECTED

## 2021-02-19 ENCOUNTER — HOSPITAL ENCOUNTER (OUTPATIENT)
Dept: CV DIAGNOSTICS | Facility: HOSPITAL | Age: 56
Discharge: HOME | End: 2021-02-19
Attending: INTERNAL MEDICINE
Payer: COMMERCIAL

## 2021-02-19 DIAGNOSIS — I10 ESSENTIAL HYPERTENSION: ICD-10-CM

## 2021-02-19 DIAGNOSIS — Z01.810 PRE-OPERATIVE CARDIOVASCULAR EXAMINATION: ICD-10-CM

## 2021-02-19 PROCEDURE — 93017 CV STRESS TEST TRACING ONLY: CPT | Performed by: INTERNAL MEDICINE

## 2021-02-19 PROCEDURE — 93350 STRESS TTE ONLY: CPT | Performed by: INTERNAL MEDICINE

## 2021-02-19 PROCEDURE — 93018 CV STRESS TEST I&R ONLY: CPT | Performed by: INTERNAL MEDICINE

## 2021-02-23 ENCOUNTER — OFFICE VISIT (OUTPATIENT)
Dept: SURGERY | Facility: CLINIC | Age: 56
End: 2021-02-23
Payer: COMMERCIAL

## 2021-02-23 VITALS
HEIGHT: 65.8 IN | OXYGEN SATURATION: 99 % | DIASTOLIC BLOOD PRESSURE: 85 MMHG | HEART RATE: 94 BPM | SYSTOLIC BLOOD PRESSURE: 125 MMHG | BODY MASS INDEX: 45.36 KG/M2 | WEIGHT: 278.88 LBS

## 2021-02-23 NOTE — PROGRESS NOTES
[address] [facility] [address]  [address] [address] [place] [address]  Dept: [phone]    2/23/2021    BARIATRIC EXISTING PATIENT/FOLLOW UP    HPI:  Manolo Mcelroy is a 48year old-year old female weigh her options and follow-up with her gastroenterologist and see me in a month    Plan: Follow-up 1 month    Haim Ayala MD  2/23/2021

## 2021-03-01 ENCOUNTER — OFFICE VISIT (OUTPATIENT)
Dept: GASTROENTEROLOGY | Age: 56
End: 2021-03-01

## 2021-03-01 VITALS — HEIGHT: 66 IN | BODY MASS INDEX: 44.68 KG/M2 | WEIGHT: 278 LBS

## 2021-03-01 DIAGNOSIS — K22.70 BARRETT'S ESOPHAGUS WITHOUT DYSPLASIA: Primary | ICD-10-CM

## 2021-03-01 DIAGNOSIS — Z86.010 PERSONAL HISTORY OF COLONIC POLYPS: ICD-10-CM

## 2021-03-01 PROCEDURE — 99204 OFFICE O/P NEW MOD 45 MIN: CPT | Performed by: INTERNAL MEDICINE

## 2021-03-01 RX ORDER — SODIUM, POTASSIUM,MAG SULFATES 17.5-3.13G
SOLUTION, RECONSTITUTED, ORAL ORAL
Qty: 1 KIT | Refills: 0 | Status: SHIPPED | OUTPATIENT
Start: 2021-03-01 | End: 2021-04-15

## 2021-03-01 RX ORDER — BISACODYL 5 MG/1
TABLET, DELAYED RELEASE ORAL
Qty: 2 TABLET | Refills: 0 | Status: SHIPPED | OUTPATIENT
Start: 2021-03-01 | End: 2021-04-15

## 2021-03-01 RX ORDER — SIMETHICONE 125 MG
TABLET,CHEWABLE ORAL
Qty: 2 TABLET | Refills: 0 | Status: SHIPPED | OUTPATIENT
Start: 2021-03-01 | End: 2021-04-15

## 2021-03-03 ENCOUNTER — E-ADVICE (OUTPATIENT)
Dept: CARDIOLOGY | Age: 56
End: 2021-03-03

## 2021-03-10 ENCOUNTER — LAB SERVICES (OUTPATIENT)
Dept: LAB | Age: 56
End: 2021-03-10

## 2021-03-10 DIAGNOSIS — Z86.010 PERSONAL HISTORY OF COLONIC POLYPS: ICD-10-CM

## 2021-03-10 DIAGNOSIS — K22.70 BARRETT'S ESOPHAGUS WITHOUT DYSPLASIA: ICD-10-CM

## 2021-03-10 PROCEDURE — U0005 INFEC AGEN DETEC AMPLI PROBE: HCPCS | Performed by: INTERNAL MEDICINE

## 2021-03-10 PROCEDURE — U0003 INFECTIOUS AGENT DETECTION BY NUCLEIC ACID (DNA OR RNA); SEVERE ACUTE RESPIRATORY SYNDROME CORONAVIRUS 2 (SARS-COV-2) (CORONAVIRUS DISEASE [COVID-19]), AMPLIFIED PROBE TECHNIQUE, MAKING USE OF HIGH THROUGHPUT TECHNOLOGIES AS DESCRIBED BY CMS-2020-01-R: HCPCS | Performed by: INTERNAL MEDICINE

## 2021-03-11 LAB
SARS-COV-2 RNA RESP QL NAA+PROBE: NOT DETECTED
SERVICE CMNT-IMP: NORMAL
SERVICE CMNT-IMP: NORMAL

## 2021-03-12 ENCOUNTER — OFFICE VISIT (OUTPATIENT)
Dept: GASTROENTEROLOGY | Age: 56
End: 2021-03-12
Attending: INTERNAL MEDICINE

## 2021-03-12 DIAGNOSIS — K22.70 BARRETT'S ESOPHAGUS WITHOUT DYSPLASIA: ICD-10-CM

## 2021-03-12 DIAGNOSIS — Z86.010 HISTORY OF COLON POLYPS: Primary | ICD-10-CM

## 2021-03-12 LAB — COLONOSCOPY STUDY: NORMAL

## 2021-03-12 PROCEDURE — 88305 TISSUE EXAM BY PATHOLOGIST: CPT | Performed by: CLINICAL MEDICAL LABORATORY

## 2021-03-12 PROCEDURE — 43239 EGD BIOPSY SINGLE/MULTIPLE: CPT | Performed by: INTERNAL MEDICINE

## 2021-03-12 PROCEDURE — 45380 COLONOSCOPY AND BIOPSY: CPT | Performed by: INTERNAL MEDICINE

## 2021-03-12 PROCEDURE — 88342 IMHCHEM/IMCYTCHM 1ST ANTB: CPT | Performed by: CLINICAL MEDICAL LABORATORY

## 2021-03-12 PROCEDURE — 88305 TISSUE EXAM BY PATHOLOGIST: CPT | Performed by: PATHOLOGY

## 2021-03-12 PROCEDURE — 88342 IMHCHEM/IMCYTCHM 1ST ANTB: CPT | Performed by: PATHOLOGY

## 2021-03-15 ENCOUNTER — LAB REQUISITION (OUTPATIENT)
Dept: LAB | Age: 56
End: 2021-03-15

## 2021-03-15 DIAGNOSIS — Z86.010 PERSONAL HISTORY OF COLONIC POLYPS: ICD-10-CM

## 2021-03-15 DIAGNOSIS — K22.70 BARRETT'S ESOPHAGUS WITHOUT DYSPLASIA: ICD-10-CM

## 2021-03-16 ENCOUNTER — DOCUMENTATION (OUTPATIENT)
Dept: GASTROENTEROLOGY | Age: 56
End: 2021-03-16

## 2021-03-17 LAB — AP REPORT: NORMAL

## 2021-03-30 ENCOUNTER — OFFICE VISIT (OUTPATIENT)
Dept: SURGERY | Facility: CLINIC | Age: 56
End: 2021-03-30
Payer: COMMERCIAL

## 2021-03-30 ENCOUNTER — TELEPHONE (OUTPATIENT)
Dept: SURGERY | Facility: CLINIC | Age: 56
End: 2021-03-30

## 2021-03-30 VITALS
BODY MASS INDEX: 45.67 KG/M2 | DIASTOLIC BLOOD PRESSURE: 74 MMHG | WEIGHT: 280.81 LBS | HEART RATE: 75 BPM | SYSTOLIC BLOOD PRESSURE: 124 MMHG | HEIGHT: 65.8 IN | OXYGEN SATURATION: 98 %

## 2021-03-30 DIAGNOSIS — M17.0 OSTEOARTHRITIS OF BOTH KNEES, UNSPECIFIED OSTEOARTHRITIS TYPE: ICD-10-CM

## 2021-03-30 DIAGNOSIS — K21.00 GASTROESOPHAGEAL REFLUX DISEASE WITH ESOPHAGITIS, UNSPECIFIED WHETHER HEMORRHAGE: ICD-10-CM

## 2021-03-30 DIAGNOSIS — Z51.81 ENCOUNTER FOR THERAPEUTIC DRUG MONITORING: ICD-10-CM

## 2021-03-30 DIAGNOSIS — I10 ESSENTIAL HYPERTENSION: ICD-10-CM

## 2021-03-30 DIAGNOSIS — Z01.818 PREOPERATIVE TESTING: ICD-10-CM

## 2021-03-30 DIAGNOSIS — E66.01 CLASS 3 SEVERE OBESITY DUE TO EXCESS CALORIES WITH SERIOUS COMORBIDITY AND BODY MASS INDEX (BMI) OF 45.0 TO 49.9 IN ADULT (HCC): Primary | ICD-10-CM

## 2021-03-30 NOTE — TELEPHONE ENCOUNTER
Please place order for preop bariatric labs and have her schedule f/u for medication reconcilation planned end of April/1st week in May. Scheduled for surgery on 5/17/21 with Dr. Socorro Borjas.

## 2021-03-30 NOTE — PROGRESS NOTES
[address] [facility], [address] [facility] [address].  Heather Henson  Dept: [phone]    3/30/2021    BARIATRIC EXISTING PATIENT/FOLLOW UP    HPI:  Lazaroklaussilvia Aguilar is a 48year old-year old female 2  50-59 - 3  60+ - 4    Male - 2  Smoking history - 2  OR time > 3 hours - 2  Prior history of VTE - 5    Total Score  10    0-14 --> Low risk --> standard prophylaxis  15-19 --> Medium risk --> protocol for postop chemoprophylaxis  20 or greater --> high

## 2021-04-01 ENCOUNTER — OFFICE VISIT (OUTPATIENT)
Dept: SURGERY | Facility: CLINIC | Age: 56
End: 2021-04-01
Payer: COMMERCIAL

## 2021-04-01 VITALS — HEIGHT: 65.8 IN | BODY MASS INDEX: 45.54 KG/M2 | WEIGHT: 280 LBS

## 2021-04-01 DIAGNOSIS — E66.01 CLASS 3 SEVERE OBESITY DUE TO EXCESS CALORIES WITH SERIOUS COMORBIDITY AND BODY MASS INDEX (BMI) OF 45.0 TO 49.9 IN ADULT (HCC): Primary | ICD-10-CM

## 2021-04-01 PROCEDURE — 3008F BODY MASS INDEX DOCD: CPT | Performed by: DIETITIAN, REGISTERED

## 2021-04-01 PROCEDURE — 97803 MED NUTRITION INDIV SUBSEQ: CPT | Performed by: DIETITIAN, REGISTERED

## 2021-04-01 NOTE — TELEPHONE ENCOUNTER
Patient is scheduled for med review with KW  Future Appointments   Date Time Provider Ole Llanes   4/20/2021  2:00 PM KATERIN Whitman [facility]

## 2021-04-01 NOTE — PROGRESS NOTES
[address] [place] [address] AND WEIGHT LOSS CLINIC  [address] [place] [address]  Dept: [phone]  Loc: [phone]    04/01/21    Bariatric Liquid Protein Nutrition Session    Elenarancho Iverson is a 48year old female    [de-identified] 90 capsule, Rfl: 1  •  topiramate 100 MG Oral Tab, Take 1 tablet (100 mg total) by mouth 2 (two) times daily. , Disp: 180 tablet, Rfl: 1  •  PEG-KCl-NaCl-NaSulf-Na Asc-C (PLENVU) 140 g Oral Recon Soln, Take 1 kit by mouth., Disp: , Rfl:   •  CVS GAS RELIEF monitor energy levels and adjust activity, as needed. Pt agreeable and verbalized understanding, will f/u 2-6 weeks post-op.     Nutrition Diagnosis: Morbid obesity: Improvement shown    Intervention     Interventions: Counseled for liquid protein diet, Gav

## 2021-04-08 RX ORDER — BUTALBITAL, ACETAMINOPHEN AND CAFFEINE 50; 325; 40 MG/1; MG/1; MG/1
1 TABLET ORAL EVERY 6 HOURS PRN
Qty: 30 TABLET | Refills: 0 | Status: CANCELLED | OUTPATIENT
Start: 2021-04-08

## 2021-04-09 RX ORDER — BUTALBITAL, ACETAMINOPHEN AND CAFFEINE 50; 325; 40 MG/1; MG/1; MG/1
1 TABLET ORAL EVERY 6 HOURS PRN
Qty: 30 TABLET | Refills: 0 | Status: SHIPPED | OUTPATIENT
Start: 2021-04-09 | End: 2021-07-30

## 2021-04-20 ENCOUNTER — OFFICE VISIT (OUTPATIENT)
Dept: INTERNAL MEDICINE CLINIC | Facility: CLINIC | Age: 56
End: 2021-04-20
Payer: COMMERCIAL

## 2021-04-20 VITALS
BODY MASS INDEX: 46.25 KG/M2 | HEART RATE: 75 BPM | DIASTOLIC BLOOD PRESSURE: 86 MMHG | WEIGHT: 281 LBS | SYSTOLIC BLOOD PRESSURE: 130 MMHG | RESPIRATION RATE: 16 BRPM | HEIGHT: 65.5 IN

## 2021-04-20 DIAGNOSIS — F43.9 STRESS: ICD-10-CM

## 2021-04-20 DIAGNOSIS — G43.909 MIGRAINE WITHOUT STATUS MIGRAINOSUS, NOT INTRACTABLE, UNSPECIFIED MIGRAINE TYPE: ICD-10-CM

## 2021-04-20 DIAGNOSIS — Z51.81 ENCOUNTER FOR THERAPEUTIC DRUG MONITORING: Primary | ICD-10-CM

## 2021-04-20 DIAGNOSIS — E66.01 MORBID OBESITY WITH BMI OF 40.0-44.9, ADULT (HCC): ICD-10-CM

## 2021-04-20 DIAGNOSIS — K21.00 GASTROESOPHAGEAL REFLUX DISEASE WITH ESOPHAGITIS, UNSPECIFIED WHETHER HEMORRHAGE: ICD-10-CM

## 2021-04-20 DIAGNOSIS — I10 ESSENTIAL HYPERTENSION: ICD-10-CM

## 2021-04-20 PROCEDURE — 3079F DIAST BP 80-89 MM HG: CPT | Performed by: NURSE PRACTITIONER

## 2021-04-20 PROCEDURE — 3008F BODY MASS INDEX DOCD: CPT | Performed by: NURSE PRACTITIONER

## 2021-04-20 PROCEDURE — 99214 OFFICE O/P EST MOD 30 MIN: CPT | Performed by: NURSE PRACTITIONER

## 2021-04-20 PROCEDURE — 3075F SYST BP GE 130 - 139MM HG: CPT | Performed by: NURSE PRACTITIONER

## 2021-04-20 RX ORDER — TOPIRAMATE 100 MG/1
50 TABLET, FILM COATED ORAL 2 TIMES DAILY
Qty: 15 TABLET | Refills: 1 | Status: ON HOLD | COMMUNITY
Start: 2021-04-20 | End: 2021-05-18

## 2021-04-20 RX ORDER — PHENTERMINE HYDROCHLORIDE 37.5 MG/1
37.5 TABLET ORAL EVERY MORNING
Qty: 30 TABLET | Refills: 0 | Status: ON HOLD | OUTPATIENT
Start: 2021-04-20 | End: 2021-05-18

## 2021-04-20 RX ORDER — MULTIVIT WITH MINERALS/LUTEIN
1000 TABLET ORAL DAILY
Status: ON HOLD | COMMUNITY
End: 2021-05-18

## 2021-04-20 RX ORDER — BUPROPION HYDROCHLORIDE 150 MG/1
150 TABLET ORAL DAILY
Qty: 30 TABLET | Refills: 0 | Status: ON HOLD | OUTPATIENT
Start: 2021-04-20 | End: 2021-05-18

## 2021-04-20 RX ORDER — FLUOXETINE HYDROCHLORIDE 20 MG/1
20 CAPSULE ORAL DAILY
Qty: 30 CAPSULE | Refills: 0 | Status: SHIPPED | OUTPATIENT
Start: 2021-04-20 | End: 2021-04-21

## 2021-04-20 NOTE — PROGRESS NOTES
Rigo Padilla is a 48year old female presents today for follow-up on medical weight loss program for the treatment of overweight, obesity, or morbid obesity with associated HTN, OA, GERD, .    S:  Current weight Wt Readings from Last 6 Encounters:  04/20 CTA in all fields, breathing non labored  CARDIO: RRR without murmur, normal S1 and S2 without clicks or gallops, no pedal edema.   GI: +BS  NEURO/MS: motor and sensory grossly intact  PSYCH: pleasant, cooperative, normal mood and affect    ASSESSMENT AND P from reduce fluoxetine and Wellbutrin XL as discussed- new scripts at pharmacy.  Although not discussed at this visit I would like to reduce your Topamax to 50 mg twice a day (can take 1/2 tab twice a day of your current 100 mg tab) so that we can wean you mouth as needed. 4/20/2021: Hold day of surgery, resume PRN for migraines post op when home. • omega-3 fatty acids 1000 MG Oral Cap Take 1 capsule by mouth daily. 4/20/2021: Last dose the day prior to bariatric surgery.  Hold post op, can reevaluate at f/

## 2021-04-20 NOTE — PATIENT INSTRUCTIONS
Continue making lifestyle changes that focus on good nutrition, regular exercise and stress management. Medication Plan: Continue current medication regimen, aside from reduce fluoxetine and Wellbutrin XL as discussed- new scripts at pharmacy.  Although dose the day prior to bariatric surgery. Hold day of surgery. Restart post op pending BP readings. • Butalbital-APAP-Caffeine -40 MG Oral Tab Take 1 tablet by mouth as needed.  4/20/2021: Hold day of surgery, resume PRN for migraines post op when HANNA HERNÁNDEZ

## 2021-04-21 ENCOUNTER — TELEPHONE (OUTPATIENT)
Dept: INTERNAL MEDICINE CLINIC | Facility: CLINIC | Age: 56
End: 2021-04-21

## 2021-04-21 DIAGNOSIS — F43.9 STRESS: ICD-10-CM

## 2021-04-21 DIAGNOSIS — Z51.81 ENCOUNTER FOR THERAPEUTIC DRUG MONITORING: ICD-10-CM

## 2021-04-21 DIAGNOSIS — E66.01 MORBID OBESITY WITH BMI OF 40.0-44.9, ADULT (HCC): ICD-10-CM

## 2021-04-21 RX ORDER — FLUOXETINE HYDROCHLORIDE 20 MG/1
20 CAPSULE ORAL DAILY
Qty: 90 CAPSULE | Refills: 0 | Status: SHIPPED | OUTPATIENT
Start: 2021-04-21 | End: 2021-07-18

## 2021-04-28 DIAGNOSIS — I10 ESSENTIAL HYPERTENSION: ICD-10-CM

## 2021-04-28 DIAGNOSIS — F32.A DEPRESSION, UNSPECIFIED DEPRESSION TYPE: ICD-10-CM

## 2021-04-28 LAB
CASE RPRT: NORMAL
PATH REPORT.FINAL DX SPEC: NORMAL

## 2021-04-30 RX ORDER — LISINOPRIL 20 MG/1
TABLET ORAL
Qty: 90 TABLET | Refills: 1 | Status: SHIPPED | OUTPATIENT
Start: 2021-04-30 | End: 2021-11-09

## 2021-04-30 RX ORDER — BUPROPION HYDROCHLORIDE 300 MG/1
TABLET ORAL
Qty: 90 TABLET | Refills: 1 | Status: SHIPPED | OUTPATIENT
Start: 2021-04-30 | End: 2021-10-29 | Stop reason: ALTCHOICE

## 2021-05-14 ENCOUNTER — LAB ENCOUNTER (OUTPATIENT)
Dept: LAB | Age: 56
End: 2021-05-14
Attending: SURGERY
Payer: COMMERCIAL

## 2021-05-14 DIAGNOSIS — Z01.818 PREOP TESTING: ICD-10-CM

## 2021-05-14 PROCEDURE — 86901 BLOOD TYPING SEROLOGIC RH(D): CPT

## 2021-05-14 PROCEDURE — 86900 BLOOD TYPING SEROLOGIC ABO: CPT

## 2021-05-14 PROCEDURE — 86850 RBC ANTIBODY SCREEN: CPT

## 2021-05-16 DIAGNOSIS — E66.01 MORBID OBESITY WITH BMI OF 40.0-44.9, ADULT (HCC): ICD-10-CM

## 2021-05-16 DIAGNOSIS — Z51.81 ENCOUNTER FOR THERAPEUTIC DRUG MONITORING: ICD-10-CM

## 2021-05-16 RX ORDER — BUPROPION HYDROCHLORIDE 150 MG/1
TABLET ORAL
Qty: 30 TABLET | Refills: 0 | OUTPATIENT
Start: 2021-05-16

## 2021-05-16 NOTE — TELEPHONE ENCOUNTER
Requesting bupropion 150 mg  LOV: 4/20/21  RTC: 2 months  Last Relevant Labs: na  Filled: 4/20/21 #30 with 0 refills    Future Appointments   Date Time Provider Ole Llanes   5/25/2021 11:30 Dora Richter MD [address] [facility]     This is

## 2021-05-17 ENCOUNTER — HOSPITAL ENCOUNTER (INPATIENT)
Facility: HOSPITAL | Age: 56
LOS: 1 days | Discharge: HOME | DRG: 621 | End: 2021-05-18
Attending: SURGERY | Admitting: SURGERY
Payer: COMMERCIAL

## 2021-05-17 ENCOUNTER — ANESTHESIA EVENT (OUTPATIENT)
Dept: SURGERY | Facility: HOSPITAL | Age: 56
DRG: 621 | End: 2021-05-17
Payer: COMMERCIAL

## 2021-05-17 ENCOUNTER — ANESTHESIA (OUTPATIENT)
Dept: SURGERY | Facility: HOSPITAL | Age: 56
DRG: 621 | End: 2021-05-17
Payer: COMMERCIAL

## 2021-05-17 DIAGNOSIS — Z01.818 PREOP TESTING: Primary | ICD-10-CM

## 2021-05-17 DIAGNOSIS — I10 ESSENTIAL HYPERTENSION: ICD-10-CM

## 2021-05-17 DIAGNOSIS — K21.00 GASTROESOPHAGEAL REFLUX DISEASE WITH ESOPHAGITIS, UNSPECIFIED WHETHER HEMORRHAGE: ICD-10-CM

## 2021-05-17 DIAGNOSIS — E66.01 MORBID OBESITY WITH BMI OF 40.0-44.9, ADULT (HCC): ICD-10-CM

## 2021-05-17 PROBLEM — E66.9 OBESITY: Status: ACTIVE | Noted: 2021-05-17

## 2021-05-17 PROCEDURE — 3E0R3BZ INTRODUCTION OF ANESTHETIC AGENT INTO SPINAL CANAL, PERCUTANEOUS APPROACH: ICD-10-PCS | Performed by: SURGERY

## 2021-05-17 PROCEDURE — 99232 SBSQ HOSP IP/OBS MODERATE 35: CPT | Performed by: HOSPITALIST

## 2021-05-17 PROCEDURE — 0DB64Z3 EXCISION OF STOMACH, PERCUTANEOUS ENDOSCOPIC APPROACH, VERTICAL: ICD-10-PCS | Performed by: SURGERY

## 2021-05-17 RX ORDER — CELECOXIB 200 MG/1
400 CAPSULE ORAL ONCE
Status: COMPLETED | OUTPATIENT
Start: 2021-05-17 | End: 2021-05-17

## 2021-05-17 RX ORDER — SODIUM CHLORIDE, SODIUM LACTATE, POTASSIUM CHLORIDE, CALCIUM CHLORIDE 600; 310; 30; 20 MG/100ML; MG/100ML; MG/100ML; MG/100ML
INJECTION, SOLUTION INTRAVENOUS CONTINUOUS
Status: DISCONTINUED | OUTPATIENT
Start: 2021-05-17 | End: 2021-05-18

## 2021-05-17 RX ORDER — HYDROCODONE BITARTRATE AND ACETAMINOPHEN 5; 325 MG/1; MG/1
2 TABLET ORAL AS NEEDED
Status: DISCONTINUED | OUTPATIENT
Start: 2021-05-17 | End: 2021-05-17 | Stop reason: HOSPADM

## 2021-05-17 RX ORDER — HYDROCODONE BITARTRATE AND ACETAMINOPHEN 5; 325 MG/1; MG/1
1 TABLET ORAL AS NEEDED
Status: DISCONTINUED | OUTPATIENT
Start: 2021-05-17 | End: 2021-05-17 | Stop reason: HOSPADM

## 2021-05-17 RX ORDER — PHENYLEPHRINE HCL 10 MG/ML
VIAL (ML) INJECTION AS NEEDED
Status: DISCONTINUED | OUTPATIENT
Start: 2021-05-17 | End: 2021-05-17 | Stop reason: SURG

## 2021-05-17 RX ORDER — ACETAMINOPHEN 160 MG/5ML
1000 SOLUTION ORAL EVERY 8 HOURS
Status: DISCONTINUED | OUTPATIENT
Start: 2021-05-17 | End: 2021-05-18

## 2021-05-17 RX ORDER — ONDANSETRON 2 MG/ML
4 INJECTION INTRAMUSCULAR; INTRAVENOUS ONCE AS NEEDED
Status: DISCONTINUED | OUTPATIENT
Start: 2021-05-17 | End: 2021-05-17 | Stop reason: HOSPADM

## 2021-05-17 RX ORDER — KETOROLAC TROMETHAMINE 15 MG/ML
15 INJECTION, SOLUTION INTRAMUSCULAR; INTRAVENOUS EVERY 6 HOURS
Status: DISCONTINUED | OUTPATIENT
Start: 2021-05-17 | End: 2021-05-18

## 2021-05-17 RX ORDER — SODIUM CHLORIDE, SODIUM LACTATE, POTASSIUM CHLORIDE, CALCIUM CHLORIDE 600; 310; 30; 20 MG/100ML; MG/100ML; MG/100ML; MG/100ML
INJECTION, SOLUTION INTRAVENOUS CONTINUOUS
Status: DISCONTINUED | OUTPATIENT
Start: 2021-05-17 | End: 2021-05-17 | Stop reason: HOSPADM

## 2021-05-17 RX ORDER — HYDROMORPHONE HYDROCHLORIDE 1 MG/ML
0.2 INJECTION, SOLUTION INTRAMUSCULAR; INTRAVENOUS; SUBCUTANEOUS EVERY 5 MIN PRN
Status: DISCONTINUED | OUTPATIENT
Start: 2021-05-17 | End: 2021-05-17 | Stop reason: HOSPADM

## 2021-05-17 RX ORDER — HYDROMORPHONE HYDROCHLORIDE 1 MG/ML
0.4 INJECTION, SOLUTION INTRAMUSCULAR; INTRAVENOUS; SUBCUTANEOUS EVERY 5 MIN PRN
Status: DISCONTINUED | OUTPATIENT
Start: 2021-05-17 | End: 2021-05-17 | Stop reason: HOSPADM

## 2021-05-17 RX ORDER — HEPARIN SODIUM 5000 [USP'U]/ML
5000 INJECTION, SOLUTION INTRAVENOUS; SUBCUTANEOUS ONCE
Status: COMPLETED | OUTPATIENT
Start: 2021-05-17 | End: 2021-05-17

## 2021-05-17 RX ORDER — PANTOPRAZOLE SODIUM 40 MG/1
40 TABLET, DELAYED RELEASE ORAL
Status: DISCONTINUED | OUTPATIENT
Start: 2021-05-18 | End: 2021-05-18

## 2021-05-17 RX ORDER — NALOXONE HYDROCHLORIDE 0.4 MG/ML
80 INJECTION, SOLUTION INTRAMUSCULAR; INTRAVENOUS; SUBCUTANEOUS AS NEEDED
Status: DISCONTINUED | OUTPATIENT
Start: 2021-05-17 | End: 2021-05-17 | Stop reason: HOSPADM

## 2021-05-17 RX ORDER — MORPHINE SULFATE 4 MG/ML
4 INJECTION, SOLUTION INTRAMUSCULAR; INTRAVENOUS EVERY 10 MIN PRN
Status: DISCONTINUED | OUTPATIENT
Start: 2021-05-17 | End: 2021-05-17 | Stop reason: HOSPADM

## 2021-05-17 RX ORDER — HYDROMORPHONE HYDROCHLORIDE 1 MG/ML
0.6 INJECTION, SOLUTION INTRAMUSCULAR; INTRAVENOUS; SUBCUTANEOUS EVERY 5 MIN PRN
Status: DISCONTINUED | OUTPATIENT
Start: 2021-05-17 | End: 2021-05-17 | Stop reason: HOSPADM

## 2021-05-17 RX ORDER — FAMOTIDINE 20 MG/1
20 TABLET ORAL ONCE
Status: DISCONTINUED | OUTPATIENT
Start: 2021-05-17 | End: 2021-05-17 | Stop reason: HOSPADM

## 2021-05-17 RX ORDER — ACETAMINOPHEN 160 MG
2000 TABLET,DISINTEGRATING ORAL DAILY
COMMUNITY
End: 2021-07-07

## 2021-05-17 RX ORDER — LIDOCAINE HYDROCHLORIDE 10 MG/ML
INJECTION, SOLUTION EPIDURAL; INFILTRATION; INTRACAUDAL; PERINEURAL AS NEEDED
Status: DISCONTINUED | OUTPATIENT
Start: 2021-05-17 | End: 2021-05-17 | Stop reason: SURG

## 2021-05-17 RX ORDER — GABAPENTIN 300 MG/1
300 CAPSULE ORAL ONCE
Status: COMPLETED | OUTPATIENT
Start: 2021-05-17 | End: 2021-05-17

## 2021-05-17 RX ORDER — ROCURONIUM BROMIDE 10 MG/ML
INJECTION, SOLUTION INTRAVENOUS AS NEEDED
Status: DISCONTINUED | OUTPATIENT
Start: 2021-05-17 | End: 2021-05-17 | Stop reason: SURG

## 2021-05-17 RX ORDER — MORPHINE SULFATE 10 MG/ML
6 INJECTION, SOLUTION INTRAMUSCULAR; INTRAVENOUS EVERY 10 MIN PRN
Status: DISCONTINUED | OUTPATIENT
Start: 2021-05-17 | End: 2021-05-17 | Stop reason: HOSPADM

## 2021-05-17 RX ORDER — PROCHLORPERAZINE EDISYLATE 5 MG/ML
5 INJECTION INTRAMUSCULAR; INTRAVENOUS ONCE AS NEEDED
Status: DISCONTINUED | OUTPATIENT
Start: 2021-05-17 | End: 2021-05-17 | Stop reason: HOSPADM

## 2021-05-17 RX ORDER — METOCLOPRAMIDE HYDROCHLORIDE 5 MG/ML
10 INJECTION INTRAMUSCULAR; INTRAVENOUS EVERY 6 HOURS PRN
Status: DISCONTINUED | OUTPATIENT
Start: 2021-05-17 | End: 2021-05-18

## 2021-05-17 RX ORDER — GLYCOPYRROLATE 0.2 MG/ML
INJECTION, SOLUTION INTRAMUSCULAR; INTRAVENOUS AS NEEDED
Status: DISCONTINUED | OUTPATIENT
Start: 2021-05-17 | End: 2021-05-17 | Stop reason: SURG

## 2021-05-17 RX ORDER — BUPIVACAINE HYDROCHLORIDE AND EPINEPHRINE 2.5; 5 MG/ML; UG/ML
INJECTION, SOLUTION INFILTRATION; PERINEURAL AS NEEDED
Status: DISCONTINUED | OUTPATIENT
Start: 2021-05-17 | End: 2021-05-17 | Stop reason: HOSPADM

## 2021-05-17 RX ORDER — FLUOXETINE HYDROCHLORIDE 20 MG/1
20 CAPSULE ORAL DAILY
Status: DISCONTINUED | OUTPATIENT
Start: 2021-05-18 | End: 2021-05-18

## 2021-05-17 RX ORDER — BUPROPION HYDROCHLORIDE 150 MG/1
TABLET ORAL
Qty: 30 TABLET | Refills: 0 | OUTPATIENT
Start: 2021-05-17

## 2021-05-17 RX ORDER — ACETAMINOPHEN 500 MG
1000 TABLET ORAL ONCE
Status: DISCONTINUED | OUTPATIENT
Start: 2021-05-17 | End: 2021-05-17 | Stop reason: HOSPADM

## 2021-05-17 RX ORDER — MORPHINE SULFATE 4 MG/ML
2 INJECTION, SOLUTION INTRAMUSCULAR; INTRAVENOUS EVERY 10 MIN PRN
Status: DISCONTINUED | OUTPATIENT
Start: 2021-05-17 | End: 2021-05-17 | Stop reason: HOSPADM

## 2021-05-17 RX ORDER — HALOPERIDOL 5 MG/ML
0.25 INJECTION INTRAMUSCULAR ONCE AS NEEDED
Status: DISCONTINUED | OUTPATIENT
Start: 2021-05-17 | End: 2021-05-17 | Stop reason: HOSPADM

## 2021-05-17 RX ORDER — SCOLOPAMINE TRANSDERMAL SYSTEM 1 MG/1
1 PATCH, EXTENDED RELEASE TRANSDERMAL
Status: DISCONTINUED | OUTPATIENT
Start: 2021-05-17 | End: 2021-05-20 | Stop reason: HOSPADM

## 2021-05-17 RX ORDER — OXYCODONE HYDROCHLORIDE 5 MG/1
5 TABLET ORAL EVERY 6 HOURS PRN
Status: DISCONTINUED | OUTPATIENT
Start: 2021-05-17 | End: 2021-05-18

## 2021-05-17 RX ORDER — KETOROLAC TROMETHAMINE 30 MG/ML
30 INJECTION, SOLUTION INTRAMUSCULAR; INTRAVENOUS EVERY 6 HOURS
Status: DISCONTINUED | OUTPATIENT
Start: 2021-05-17 | End: 2021-05-18

## 2021-05-17 RX ORDER — ONDANSETRON 2 MG/ML
4 INJECTION INTRAMUSCULAR; INTRAVENOUS EVERY 6 HOURS PRN
Status: DISCONTINUED | OUTPATIENT
Start: 2021-05-17 | End: 2021-05-18

## 2021-05-17 RX ORDER — PANTOPRAZOLE SODIUM 40 MG/1
40 TABLET, DELAYED RELEASE ORAL
Status: DISCONTINUED | OUTPATIENT
Start: 2021-05-18 | End: 2021-05-17

## 2021-05-17 RX ORDER — DEXAMETHASONE SODIUM PHOSPHATE 4 MG/ML
VIAL (ML) INJECTION AS NEEDED
Status: DISCONTINUED | OUTPATIENT
Start: 2021-05-17 | End: 2021-05-17 | Stop reason: SURG

## 2021-05-17 RX ORDER — LISINOPRIL 20 MG/1
20 TABLET ORAL DAILY
Status: DISCONTINUED | OUTPATIENT
Start: 2021-05-18 | End: 2021-05-18

## 2021-05-17 RX ORDER — HEPARIN SODIUM 5000 [USP'U]/ML
5000 INJECTION, SOLUTION INTRAVENOUS; SUBCUTANEOUS EVERY 8 HOURS SCHEDULED
Status: DISCONTINUED | OUTPATIENT
Start: 2021-05-18 | End: 2021-05-18

## 2021-05-17 RX ORDER — ONDANSETRON 2 MG/ML
INJECTION INTRAMUSCULAR; INTRAVENOUS AS NEEDED
Status: DISCONTINUED | OUTPATIENT
Start: 2021-05-17 | End: 2021-05-17 | Stop reason: SURG

## 2021-05-17 RX ORDER — ACETAMINOPHEN 500 MG
1000 TABLET ORAL ONCE
Status: COMPLETED | OUTPATIENT
Start: 2021-05-17 | End: 2021-05-17

## 2021-05-17 RX ORDER — FEXOFENADINE HCL 180 MG/1
180 TABLET ORAL DAILY
COMMUNITY

## 2021-05-17 RX ORDER — EPHEDRINE SULFATE 50 MG/ML
INJECTION, SOLUTION INTRAVENOUS AS NEEDED
Status: DISCONTINUED | OUTPATIENT
Start: 2021-05-17 | End: 2021-05-17 | Stop reason: SURG

## 2021-05-17 RX ADMIN — SODIUM CHLORIDE, SODIUM LACTATE, POTASSIUM CHLORIDE, CALCIUM CHLORIDE: 600; 310; 30; 20 INJECTION, SOLUTION INTRAVENOUS at 10:41:00

## 2021-05-17 RX ADMIN — EPHEDRINE SULFATE 5 MG: 50 INJECTION, SOLUTION INTRAVENOUS at 10:38:00

## 2021-05-17 RX ADMIN — GLYCOPYRROLATE 0.1 MG: 0.2 INJECTION, SOLUTION INTRAMUSCULAR; INTRAVENOUS at 10:36:00

## 2021-05-17 RX ADMIN — ONDANSETRON 4 MG: 2 INJECTION INTRAMUSCULAR; INTRAVENOUS at 10:33:00

## 2021-05-17 RX ADMIN — PHENYLEPHRINE HCL 100 MCG: 10 MG/ML VIAL (ML) INJECTION at 10:36:00

## 2021-05-17 RX ADMIN — PHENYLEPHRINE HCL 100 MCG: 10 MG/ML VIAL (ML) INJECTION at 10:38:00

## 2021-05-17 RX ADMIN — DEXAMETHASONE SODIUM PHOSPHATE 4 MG: 4 MG/ML VIAL (ML) INJECTION at 10:33:00

## 2021-05-17 RX ADMIN — SODIUM CHLORIDE, SODIUM LACTATE, POTASSIUM CHLORIDE, CALCIUM CHLORIDE: 600; 310; 30; 20 INJECTION, SOLUTION INTRAVENOUS at 10:12:00

## 2021-05-17 RX ADMIN — LIDOCAINE HYDROCHLORIDE 25 MG: 10 INJECTION, SOLUTION EPIDURAL; INFILTRATION; INTRACAUDAL; PERINEURAL at 10:15:00

## 2021-05-17 RX ADMIN — EPHEDRINE SULFATE 7.5 MG: 50 INJECTION, SOLUTION INTRAVENOUS at 10:29:00

## 2021-05-17 RX ADMIN — ROCURONIUM BROMIDE 5 MG: 10 INJECTION, SOLUTION INTRAVENOUS at 10:19:00

## 2021-05-17 RX ADMIN — PHENYLEPHRINE HCL 100 MCG: 10 MG/ML VIAL (ML) INJECTION at 10:56:00

## 2021-05-17 RX ADMIN — EPHEDRINE SULFATE 10 MG: 50 INJECTION, SOLUTION INTRAVENOUS at 10:36:00

## 2021-05-17 RX ADMIN — ROCURONIUM BROMIDE 25 MG: 10 INJECTION, SOLUTION INTRAVENOUS at 10:28:00

## 2021-05-17 RX ADMIN — LIDOCAINE HYDROCHLORIDE 25 MG: 10 INJECTION, SOLUTION EPIDURAL; INFILTRATION; INTRACAUDAL; PERINEURAL at 10:19:00

## 2021-05-17 RX ADMIN — EPHEDRINE SULFATE 5 MG: 50 INJECTION, SOLUTION INTRAVENOUS at 10:33:00

## 2021-05-17 RX ADMIN — SODIUM CHLORIDE, SODIUM LACTATE, POTASSIUM CHLORIDE, CALCIUM CHLORIDE: 600; 310; 30; 20 INJECTION, SOLUTION INTRAVENOUS at 11:25:00

## 2021-05-17 RX ADMIN — ROCURONIUM BROMIDE 5 MG: 10 INJECTION, SOLUTION INTRAVENOUS at 11:14:00

## 2021-05-17 NOTE — ANESTHESIA POSTPROCEDURE EVALUATION
Patient: Hansa Pires    Procedure Summary     Date: 05/17/21 Room / Location: [address] [place] [address] MAIN OR 01 / 300 [facility] MAIN OR    Anesthesia Start: 8730 Anesthesia Stop: 4984    Procedures:       laparoscopic sleeve gastrectomy, hiatal hernia repair (N/A )      Pily Hawk

## 2021-05-17 NOTE — H&P
[facility] - [facility]    History & Physical    Timi Moctezuma Patient Status:  Inpatient    8/10/1965 MRN [id number]   Location [facility] Attending Franklin Live MD   Hosp Day # 0 PCP FREDRICK STEVEN     Date of Ad Medications:  Multiple Vitamins-Minerals (BARIATRIC MULTIVITAMINS/IRON OR), Take by mouth., Disp: , Rfl:   Multiple Vitamins-Minerals (BARIATRIC FUSION) Oral Chew Tab, Chew by mouth., Disp: , Rfl: , 5/16/2021 at 1000  Fexofenadine HCl 180 MG Oral Tab, Take Oropharynx is clear. Neck: No tenderness to palpitation. Full range of motion to flexion and extension, lateral rotation and lateral flexion of cervical spine. No JVD. Supple. Lungs: Clear to auscultation bilaterally.   Cardiac: Regular rate and rhythm

## 2021-05-17 NOTE — PROGRESS NOTES
[facility]    Progress Note    Lanis Loges Patient Status:  Inpatient    8/10/1965 MRN [id number]   Location [address] [facility] Attending Yonas Mcleod MD   Hosp Day # 0 PCP FREDRICK STEVEN     HPI/S ALKPHO, BILT, TP, AST, ALT, PTT, INR, PT, T4F, TSH, TSHREFLEX, JOESPH, LIP, GGT, PSA, DDIMER, ESRML, ESRPF, CRP, BNP, MG, PHOS, TROP, CK, CKMB, DERREK, RPR, B12, ETOH, POCGLU    No results found. Assessment & Plan: Morbid obesity with BMI of 40.0-44. 9

## 2021-05-17 NOTE — TELEPHONE ENCOUNTER
Requesting Bupropion 150 mg    PT HAS SURGERY TODAY. It is noted to stop bupropion one day prior to surgery and she is not to resume after.   Denied this AGAIN with that note

## 2021-05-17 NOTE — OPERATIVE REPORT
Shikha Cornelius / Aden Hartman / Celia Valencia / Lisbeth Beard / Lorin Cummins / Leah Arambula - [phone]  Answering Service [phone]        Preop Diagnosis: Morbid Obesity secondary to excess calories, paraesophageal hernia with trocar, and a 12 mm right paramedian trocar were all inserted under direct vision. A Vonda liver retractor was inserted through an epigastric port. We began by mobilizing the greater curvature of the stomach using a LigaSure device.   We took this d we turned our attention towards a sleeve gastrectomy. We then advanced a 40 Western Tammie VISI G bougie that was inserted by anesthesia and guided along the lesser curve towards the antrum.   We then performed our sleeve gastrectomy starting with 3 fires of the

## 2021-05-17 NOTE — ANESTHESIA PROCEDURE NOTES
Airway  Urgency: Elective      General Information and Staff    Patient location during procedure: OR  Anesthesiologist: Ortiz Bartlett DO  Resident/CRNA: Flavia Mcguire CRNA  Performed: CRNA     Indications and Patient Condition  Indications for airwa

## 2021-05-17 NOTE — ANESTHESIA PREPROCEDURE EVALUATION
Anesthesia PreOp Note    HPI:     Sergei Burch is a 48year old female who presents for preoperative consultation requested by: Elsi Bartholomew MD    Date of Surgery: 5/17/2021    Procedure(s):  laparoscopic possible open sleeve gastrectomy, laparosc mouth., Disp: , Rfl:   Multiple Vitamins-Minerals (BARIATRIC FUSION) Oral Chew Tab, Chew by mouth., Disp: , Rfl: , 5/16/2021 at 1000  Fexofenadine HCl 180 MG Oral Tab, Take 180 mg by mouth daily. , Disp: , Rfl: , 5/13/2021  Vitamin D3, Cholecalciferol, 49 M Marital status:       Spouse name: Not on file      Number of children: Not on file      Years of education: Not on file      Highest education level: Not on file    Occupational History      Not on file    Tobacco Use      Smoking status: Never Smo pressure is 108/68 and her pulse is 67. Her respiration is 16 and oxygen saturation is 97%.     05/13/21  1718 05/17/21  0900   BP:  108/68   Pulse:  67   Resp:  16   Temp:  98.8 °F (37.1 °C)   TempSrc:  Oral   SpO2:  97%   Weight: 124.7 kg (275 lb) 124.3 k

## 2021-05-18 ENCOUNTER — DOCUMENTATION ONLY (OUTPATIENT)
Dept: SURGERY | Facility: CLINIC | Age: 56
End: 2021-05-18

## 2021-05-18 VITALS
SYSTOLIC BLOOD PRESSURE: 105 MMHG | HEIGHT: 66 IN | RESPIRATION RATE: 16 BRPM | BODY MASS INDEX: 44.03 KG/M2 | TEMPERATURE: 98 F | DIASTOLIC BLOOD PRESSURE: 66 MMHG | OXYGEN SATURATION: 97 % | WEIGHT: 274 LBS | HEART RATE: 50 BPM

## 2021-05-18 PROCEDURE — 99239 HOSP IP/OBS DSCHRG MGMT >30: CPT | Performed by: HOSPITALIST

## 2021-05-18 RX ORDER — ACETAMINOPHEN 160 MG/5ML
1000 SOLUTION ORAL EVERY 8 HOURS
Qty: 473 ML | Refills: 0 | Status: SHIPPED | OUTPATIENT
Start: 2021-05-18 | End: 2021-07-07

## 2021-05-18 RX ORDER — PANTOPRAZOLE SODIUM 40 MG/1
40 TABLET, DELAYED RELEASE ORAL
Qty: 90 TABLET | Refills: 0 | Status: SHIPPED | OUTPATIENT
Start: 2021-05-19

## 2021-05-18 RX ORDER — OXYCODONE HYDROCHLORIDE 5 MG/1
5 TABLET ORAL EVERY 6 HOURS PRN
Qty: 10 TABLET | Refills: 0 | Status: SHIPPED | OUTPATIENT
Start: 2021-05-18 | End: 2021-07-07

## 2021-05-18 NOTE — DISCHARGE SUMMARY
[facility]    Discharge Summary    Jm Razo Patient Status:  Inpatient    8/10/1965 MRN [id number]   Location [facility] 4W/SW/SE Attending Pritesh Reeves MD   Hosp Day # 1 PCP FREDRICK STEVEN     Date of Admission: Prescription details   acetaminophen 160 MG/5ML Soln  Commonly known as: TYLENOL      Take 31.3 mL (1,000 mg total) by mouth every 8 (eight) hours.    Quantity: 473 mL  Refills: 0     oxyCODONE HCl 5 MG Tabs  Commonly known as: ROXICODONE      Take 1 tablet HCl 5 MG Tabs  · Pantoprazole Sodium 40 MG Tbealex Davis  5/18/2021  9:18 AM    Greater than 30 minutes spent on preparation and coordination of this discharge

## 2021-05-18 NOTE — BH NUTRITION NOTE
Bariatric Inpatient Nutrition Note      Ivette Elias is a 48year old female.     Procedure: Sleeve gastrectomy  Surgery Date: 5/17/2021  Medical Diagnosis: Morbid obesity    Height:  Ht Readings from Last 1 Encounters:  05/17/21 : 5' 6\" (1.676 m) Once  lactated ringers infusion, , Intravenous, Continuous  Pantoprazole Sodium (PROTONIX) EC tab 40 mg, 40 mg, Oral, QAM AC  Ketorolac Tromethamine (TORADOL) injection 15 mg, 15 mg, Intravenous, Q6H   Or  ketorolac tromethamine (TORADOL) 30 MG/ML injectio

## 2021-05-18 NOTE — DISCHARGE PLANNING
Patient belongings at bedside. Patient cleared medically and by bariatric team. Discharge papers given and all questions answered.

## 2021-05-18 NOTE — PROGRESS NOTES
Quinn Lomas / My Robles / Shea Banda / Orville Perez / Stephanie Loera / Marta Show - [phone]  Answering Service [phone]      Progress Note    Lorie Phan Patient Status:  Inpatient    8/10/1965 MRN [id number]

## 2021-05-18 NOTE — PLAN OF CARE
Rec'd Sindy with persistent nausea. Rec'd PRN IV Zofran without relief, rec'd PRN Reglan @ 2010 with good results. She was able to resume taking po Clear liquids. She is up with standby assist. Abdominal lap sites with dermabond clean dry & intact.  Using Advance diet as tolerated, if ordered  - Obtain nutritional consult as needed  - Evaluate fluid balance  Outcome: Progressing  Goal: Achieves appropriate nutritional intake (bariatric)  Description: INTERVENTIONS:  - Monitor for over-consumption  - Identif risk of falls.   - Rhododendron fall precautions as indicated by assessment.  - Educate pt/family on patient safety including physical limitations  - Instruct pt to call for assistance with activity based on assessment  - Modify environment to reduce risk of i

## 2021-05-18 NOTE — PLAN OF CARE
Patient a&o x4. On RA. 5 incision lap sights open to air and CDI. Patient voiding. Patient tolerating diet and denies nausea. Call light w/in reach and bed alarm on. Plan is to discharge home today.     Problem: Patient Centered Care  Goal: Patient preferen Discharge  Goal: Achieves appropriate nutritional intake (bariatric)  Description: INTERVENTIONS:  - Monitor for over-consumption  - Identify factors contributing to increased intake, treat as appropriate  - Monitor I&O, WT and lab values  - Obtain nutriti pt/family on patient safety including physical limitations  - Instruct pt to call for assistance with activity based on assessment  - Modify environment to reduce risk of injury  - Provide assistive devices as appropriate  - Consider OT/PT consult to juan

## 2021-05-19 NOTE — PAYOR COMM NOTE
--------------  DISCHARGE REVIEW    Payor: [address] #:  [id number]  Authorization Number: [id number]    Admit date: 5/17/21  Admit time:   8:32 AM  Discharge Date: 5/18/2021 12:08 PM       Date of Admission: 5/17/2021

## 2021-05-20 ENCOUNTER — TELEPHONE (OUTPATIENT)
Dept: SURGERY | Facility: CLINIC | Age: 56
End: 2021-05-20

## 2021-05-20 NOTE — TELEPHONE ENCOUNTER
Pt reports \"doing really well, today I feel really good\"; walked her dog today; yesterday, pt had a bout of coughing that made her throw up but has not vomitted since; denies F/N/Tachy; concerned her BP went up to 147/85 so she started taking lisinopril

## 2021-05-25 ENCOUNTER — DOCUMENTATION ONLY (OUTPATIENT)
Dept: SURGERY | Facility: CLINIC | Age: 56
End: 2021-05-25

## 2021-05-25 ENCOUNTER — OFFICE VISIT (OUTPATIENT)
Dept: SURGERY | Facility: CLINIC | Age: 56
End: 2021-05-25
Payer: COMMERCIAL

## 2021-05-25 VITALS
BODY MASS INDEX: 45.32 KG/M2 | OXYGEN SATURATION: 100 % | HEART RATE: 74 BPM | RESPIRATION RATE: 16 BRPM | DIASTOLIC BLOOD PRESSURE: 74 MMHG | SYSTOLIC BLOOD PRESSURE: 122 MMHG | TEMPERATURE: 96.7 F | HEIGHT: 66 IN | WEIGHT: 282 LBS

## 2021-05-25 VITALS
WEIGHT: 269 LBS | OXYGEN SATURATION: 94 % | SYSTOLIC BLOOD PRESSURE: 120 MMHG | DIASTOLIC BLOOD PRESSURE: 88 MMHG | BODY MASS INDEX: 43.75 KG/M2 | HEIGHT: 65.8 IN | HEART RATE: 83 BPM

## 2021-05-25 NOTE — PROGRESS NOTES
Provided/reviewed bariatric post-op orientation and HELP card; stressed importance of fluids, pt drinking ~ 40 ozs, will try to get to 48 then 64 ozs./day' bariatric vits, pt taking Bariatric Choice 4x/day; meds to take/avoid; activities allowed/avoid; sig

## 2021-05-25 NOTE — PROGRESS NOTES
[address]  Dept: [phone]    5/25/2021    BARIATRIC EXISTING PATIENT/FOLLOW UP    HPI:  Baldo Chappell is a 48year old-year old fema

## 2021-06-02 ENCOUNTER — OFFICE VISIT (OUTPATIENT)
Dept: INTERNAL MEDICINE CLINIC | Facility: CLINIC | Age: 56
End: 2021-06-02
Payer: COMMERCIAL

## 2021-06-02 VITALS — BODY MASS INDEX: 42.01 KG/M2 | HEIGHT: 66 IN | WEIGHT: 261.38 LBS

## 2021-06-02 DIAGNOSIS — E66.01 CLASS 3 SEVERE OBESITY DUE TO EXCESS CALORIES WITH SERIOUS COMORBIDITY AND BODY MASS INDEX (BMI) OF 40.0 TO 44.9 IN ADULT (HCC): Primary | ICD-10-CM

## 2021-06-02 PROCEDURE — 3008F BODY MASS INDEX DOCD: CPT | Performed by: DIETITIAN, REGISTERED

## 2021-06-02 PROCEDURE — 97803 MED NUTRITION INDIV SUBSEQ: CPT | Performed by: DIETITIAN, REGISTERED

## 2021-06-02 NOTE — PROGRESS NOTES
[address] [address]  Dept: [phone]  Loc: [phone]    06/02/21      Bariatric Follow-up Nutrition Session    Venkat Matute is a 48year old female.      As morning before breakfast., Disp: 90 tablet, Rfl: 0  •  Multiple Vitamins-Minerals (BARIATRIC MULTIVITAMINS/IRON OR), Take by mouth., Disp: , Rfl:   •  Multiple Vitamins-Minerals (BARIATRIC FUSION) Oral Chew Tab, Chew by mouth., Disp: , Rfl:   •  Fexofenadi the last few days. Tried an egg yesterday but was unable to keep it down. Denied eating too quickly or not chewing well enough. Reviewed strategies to help improve protein/fluid intake, rec pt try protein water. Is taking MVs as instructed w/ no issues.  Is

## 2021-06-25 ENCOUNTER — IMMUNIZATION (OUTPATIENT)
Dept: LAB | Facility: HOSPITAL | Age: 56
End: 2021-06-25
Attending: EMERGENCY MEDICINE
Payer: COMMERCIAL

## 2021-06-25 DIAGNOSIS — Z23 NEED FOR VACCINATION: Primary | ICD-10-CM

## 2021-06-25 PROCEDURE — 0001A SARSCOV2 VAC 30MCG/0.3ML IM: CPT

## 2021-06-29 ENCOUNTER — OFFICE VISIT (OUTPATIENT)
Dept: SURGERY | Facility: CLINIC | Age: 56
End: 2021-06-29
Payer: COMMERCIAL

## 2021-06-29 VITALS
DIASTOLIC BLOOD PRESSURE: 80 MMHG | WEIGHT: 251.19 LBS | BODY MASS INDEX: 40.85 KG/M2 | OXYGEN SATURATION: 96 % | SYSTOLIC BLOOD PRESSURE: 105 MMHG | HEART RATE: 82 BPM | HEIGHT: 65.8 IN

## 2021-06-29 RX ORDER — BUPROPION HYDROCHLORIDE 300 MG/1
TABLET ORAL
COMMUNITY
Start: 2021-04-30

## 2021-06-29 NOTE — PROGRESS NOTES
[address]  Dept: [phone]    6/29/2021    BARIATRIC EXISTING PATIENT/FOLLOW UP    HPI:  María Mazariegos is a 48year old-year old fema

## 2021-07-06 NOTE — PROGRESS NOTES
Jaquanjus Cota is a 48year old female presents today for follow-up on medical weight loss program for the treatment of overweight, obesity, or morbid obesity with associated HTN, OA, GERD, .    S:  Current weight Wt Readings from Last 6 Encounters:  07/ in all fields, breathing non labored  CARDIO: RRR without murmur, normal S1 and S2 without clicks or gallops, no pedal edema.   GI: +BS, soft, incision sites healed  NEURO/MS: motor and sensory grossly intact  PSYCH: pleasant, cooperative, normal mood and a \"average\" man is 53years of age and currently weighs 190 lbs. • Registry members have lost an average of 66 lbs and kept it off for 5.5 years. • These averages, however, hide a lot of diversity:  o Weight losses have ranged from 30 to 300 lbs.   o Dura

## 2021-07-07 ENCOUNTER — OFFICE VISIT (OUTPATIENT)
Dept: INTERNAL MEDICINE CLINIC | Facility: CLINIC | Age: 56
End: 2021-07-07
Payer: COMMERCIAL

## 2021-07-07 VITALS
WEIGHT: 247 LBS | HEIGHT: 65.5 IN | BODY MASS INDEX: 40.66 KG/M2 | DIASTOLIC BLOOD PRESSURE: 72 MMHG | HEART RATE: 68 BPM | RESPIRATION RATE: 14 BRPM | SYSTOLIC BLOOD PRESSURE: 120 MMHG

## 2021-07-07 DIAGNOSIS — Z51.81 ENCOUNTER FOR THERAPEUTIC DRUG MONITORING: Primary | ICD-10-CM

## 2021-07-07 DIAGNOSIS — I10 ESSENTIAL HYPERTENSION: ICD-10-CM

## 2021-07-07 DIAGNOSIS — E66.01 MORBID OBESITY WITH BMI OF 40.0-44.9, ADULT (HCC): ICD-10-CM

## 2021-07-07 DIAGNOSIS — K21.00 GASTROESOPHAGEAL REFLUX DISEASE WITH ESOPHAGITIS, UNSPECIFIED WHETHER HEMORRHAGE: ICD-10-CM

## 2021-07-07 PROCEDURE — 99213 OFFICE O/P EST LOW 20 MIN: CPT | Performed by: NURSE PRACTITIONER

## 2021-07-07 PROCEDURE — 3078F DIAST BP <80 MM HG: CPT | Performed by: NURSE PRACTITIONER

## 2021-07-07 PROCEDURE — 3074F SYST BP LT 130 MM HG: CPT | Performed by: NURSE PRACTITIONER

## 2021-07-07 PROCEDURE — 3008F BODY MASS INDEX DOCD: CPT | Performed by: NURSE PRACTITIONER

## 2021-07-07 RX ORDER — LISINOPRIL 20 MG/1
1 TABLET ORAL DAILY
COMMUNITY
Start: 2021-04-30

## 2021-07-07 NOTE — PATIENT INSTRUCTIONS
Continue making lifestyle changes that focus on good nutrition, regular exercise and stress management. Medication Plan: Trial Bariatric Advantage chewable, sample provided. Follow up with dietician at next visit.     Next steps to work on before next of Control Registry @ www. [facility]. ws

## 2021-07-16 ENCOUNTER — IMMUNIZATION (OUTPATIENT)
Dept: LAB | Facility: HOSPITAL | Age: 56
End: 2021-07-16
Attending: EMERGENCY MEDICINE
Payer: COMMERCIAL

## 2021-07-16 DIAGNOSIS — F43.9 STRESS: ICD-10-CM

## 2021-07-16 DIAGNOSIS — Z51.81 ENCOUNTER FOR THERAPEUTIC DRUG MONITORING: ICD-10-CM

## 2021-07-16 DIAGNOSIS — Z23 NEED FOR VACCINATION: Primary | ICD-10-CM

## 2021-07-16 DIAGNOSIS — E66.01 MORBID OBESITY WITH BMI OF 40.0-44.9, ADULT (HCC): ICD-10-CM

## 2021-07-16 PROCEDURE — 0002A SARSCOV2 VAC 30MCG/0.3ML IM: CPT

## 2021-07-16 NOTE — TELEPHONE ENCOUNTER
Requesting Fluoxetine 20 mg  LOV: 7/7/21  RTC: 3 months  Last Relevant Labs: na  Filled: 4/21/21 #90 with 0 refills

## 2021-07-18 RX ORDER — FLUOXETINE HYDROCHLORIDE 20 MG/1
CAPSULE ORAL
Qty: 90 CAPSULE | Refills: 0 | Status: SHIPPED | OUTPATIENT
Start: 2021-07-18 | End: 2021-10-19

## 2021-07-30 RX ORDER — BUTALBITAL, ACETAMINOPHEN AND CAFFEINE 50; 325; 40 MG/1; MG/1; MG/1
1 TABLET ORAL EVERY 6 HOURS PRN
Qty: 30 TABLET | Refills: 0 | Status: SHIPPED | OUTPATIENT
Start: 2021-07-30 | End: 2021-11-24

## 2021-08-03 RX ORDER — TOPIRAMATE 100 MG/1
TABLET, FILM COATED ORAL
Qty: 180 TABLET | Refills: 1 | OUTPATIENT
Start: 2021-08-03

## 2021-08-04 ENCOUNTER — OFFICE VISIT (OUTPATIENT)
Dept: INTERNAL MEDICINE CLINIC | Facility: CLINIC | Age: 56
End: 2021-08-04
Payer: COMMERCIAL

## 2021-08-04 VITALS — BODY MASS INDEX: 39.87 KG/M2 | WEIGHT: 242.19 LBS | HEIGHT: 65.5 IN

## 2021-08-04 DIAGNOSIS — E66.01 CLASS 2 SEVERE OBESITY DUE TO EXCESS CALORIES WITH SERIOUS COMORBIDITY AND BODY MASS INDEX (BMI) OF 39.0 TO 39.9 IN ADULT (HCC): Primary | ICD-10-CM

## 2021-08-04 PROCEDURE — 3008F BODY MASS INDEX DOCD: CPT | Performed by: DIETITIAN, REGISTERED

## 2021-08-04 PROCEDURE — 97803 MED NUTRITION INDIV SUBSEQ: CPT | Performed by: DIETITIAN, REGISTERED

## 2021-08-04 NOTE — PROGRESS NOTES
[address] [place] [address] [facility]IN[facility]  [address] [place] [id number]  Dept: [phone]  Loc: [phone]    08/04/21      Bariatric Follow-up Nutrition Session    Teri Billy is a 48year old female.      As total) by mouth every morning before breakfast., Disp: 90 tablet, Rfl: 0  •  Multiple Vitamins-Minerals (BARIATRIC FUSION) Oral Chew Tab, Chew by mouth., Disp: , Rfl:   •  Fexofenadine HCl 180 MG Oral Tab, Take 180 mg by mouth daily. , Disp: , Rfl:   •  But medications or vitamins consistently because she cannot keep them down. Symptoms sound similar to dumping, but appears to occur randomly vs after eating. Is able to keep down water and broth.  Tried soup with a small amount of shredded chicken and kept it d

## 2021-08-17 ENCOUNTER — OFFICE VISIT (OUTPATIENT)
Dept: SURGERY | Facility: CLINIC | Age: 56
End: 2021-08-17
Payer: COMMERCIAL

## 2021-08-17 VITALS
SYSTOLIC BLOOD PRESSURE: 138 MMHG | OXYGEN SATURATION: 98 % | HEART RATE: 48 BPM | DIASTOLIC BLOOD PRESSURE: 84 MMHG | BODY MASS INDEX: 39.03 KG/M2 | WEIGHT: 240 LBS | HEIGHT: 65.8 IN

## 2021-08-17 DIAGNOSIS — R11.11 VOMITING WITHOUT NAUSEA, INTRACTABILITY OF VOMITING NOT SPECIFIED, UNSPECIFIED VOMITING TYPE: Primary | ICD-10-CM

## 2021-08-17 NOTE — PROGRESS NOTES
[address]  Dept: [phone]    8/17/2021    BARIATRIC EXISTING PATIENT/FOLLOW UP    HPI:  Timmy Gilberto is a 55year old-year old fema time this will pass    Plan: Plan for upper GI as well as labs.   Follow-up with me afterwards    Gerardo Rao MD  8/17/2021

## 2021-08-23 ENCOUNTER — TELEPHONE (OUTPATIENT)
Dept: SURGERY | Facility: CLINIC | Age: 56
End: 2021-08-23

## 2021-08-26 ENCOUNTER — LAB ENCOUNTER (OUTPATIENT)
Dept: LAB | Age: 56
End: 2021-08-26
Attending: SURGERY
Payer: COMMERCIAL

## 2021-08-26 DIAGNOSIS — Z51.81 ENCOUNTER FOR THERAPEUTIC DRUG MONITORING: ICD-10-CM

## 2021-08-26 DIAGNOSIS — K21.00 GASTROESOPHAGEAL REFLUX DISEASE WITH ESOPHAGITIS, UNSPECIFIED WHETHER HEMORRHAGE: ICD-10-CM

## 2021-08-26 DIAGNOSIS — E66.01 CLASS 3 SEVERE OBESITY DUE TO EXCESS CALORIES WITH SERIOUS COMORBIDITY AND BODY MASS INDEX (BMI) OF 45.0 TO 49.9 IN ADULT (HCC): ICD-10-CM

## 2021-08-26 DIAGNOSIS — I10 ESSENTIAL HYPERTENSION: ICD-10-CM

## 2021-08-26 DIAGNOSIS — R11.11 VOMITING WITHOUT NAUSEA, INTRACTABILITY OF VOMITING NOT SPECIFIED, UNSPECIFIED VOMITING TYPE: ICD-10-CM

## 2021-08-26 DIAGNOSIS — M17.0 OSTEOARTHRITIS OF BOTH KNEES, UNSPECIFIED OSTEOARTHRITIS TYPE: ICD-10-CM

## 2021-08-26 DIAGNOSIS — Z01.818 PREOPERATIVE TESTING: ICD-10-CM

## 2021-08-26 LAB
ALBUMIN SERPL-MCNC: 3.3 G/DL (ref 3.4–5)
ALBUMIN/GLOB SERPL: 1 {RATIO} (ref 1–2)
ALP LIVER SERPL-CCNC: 53 U/L
ALT SERPL-CCNC: 22 U/L
ANION GAP SERPL CALC-SCNC: 4 MMOL/L (ref 0–18)
AST SERPL-CCNC: 15 U/L (ref 15–37)
BASOPHILS # BLD AUTO: 0.06 X10(3) UL (ref 0–0.2)
BASOPHILS NFR BLD AUTO: 1 %
BILIRUB SERPL-MCNC: 0.5 MG/DL (ref 0.1–2)
BUN BLD-MCNC: 9 MG/DL (ref 7–18)
CALCIUM BLD-MCNC: 9.1 MG/DL (ref 8.5–10.1)
CHLORIDE SERPL-SCNC: 113 MMOL/L (ref 98–112)
CHOLEST SMN-MCNC: 206 MG/DL (ref ?–200)
CO2 SERPL-SCNC: 25 MMOL/L (ref 21–32)
CREAT BLD-MCNC: 0.93 MG/DL
DEPRECATED HBV CORE AB SER IA-ACNC: 177.9 NG/ML
EOSINOPHIL # BLD AUTO: 0.2 X10(3) UL (ref 0–0.7)
EOSINOPHIL NFR BLD AUTO: 3.4 %
ERYTHROCYTE [DISTWIDTH] IN BLOOD BY AUTOMATED COUNT: 13.7 %
EST. AVERAGE GLUCOSE BLD GHB EST-MCNC: 111 MG/DL (ref 68–126)
FOLATE SERPL-MCNC: 8.3 NG/ML (ref 8.7–?)
GLOBULIN PLAS-MCNC: 3.2 G/DL (ref 2.8–4.4)
GLUCOSE BLD-MCNC: 74 MG/DL (ref 70–99)
HAV IGM SER QL: 2 MG/DL (ref 1.6–2.6)
HBA1C MFR BLD HPLC: 5.5 % (ref ?–5.7)
HCT VFR BLD AUTO: 37.2 %
HDLC SERPL-MCNC: 70 MG/DL (ref 40–59)
HGB BLD-MCNC: 12 G/DL
IMM GRANULOCYTES # BLD AUTO: 0.01 X10(3) UL (ref 0–1)
IMM GRANULOCYTES NFR BLD: 0.2 %
IRON SATURATION: 21 %
IRON SERPL-MCNC: 59 UG/DL
LDLC SERPL CALC-MCNC: 124 MG/DL (ref ?–100)
LYMPHOCYTES # BLD AUTO: 2.3 X10(3) UL (ref 1–4)
LYMPHOCYTES NFR BLD AUTO: 39.5 %
M PROTEIN MFR SERPL ELPH: 6.5 G/DL (ref 6.4–8.2)
MCH RBC QN AUTO: 30.2 PG (ref 26–34)
MCHC RBC AUTO-ENTMCNC: 32.3 G/DL (ref 31–37)
MCV RBC AUTO: 93.5 FL
MONOCYTES # BLD AUTO: 0.53 X10(3) UL (ref 0.1–1)
MONOCYTES NFR BLD AUTO: 9.1 %
NEUTROPHILS # BLD AUTO: 2.72 X10 (3) UL (ref 1.5–7.7)
NEUTROPHILS # BLD AUTO: 2.72 X10(3) UL (ref 1.5–7.7)
NEUTROPHILS NFR BLD AUTO: 46.8 %
NONHDLC SERPL-MCNC: 136 MG/DL (ref ?–130)
OSMOLALITY SERPL CALC.SUM OF ELEC: 291 MOSM/KG (ref 275–295)
PATIENT FASTING Y/N/NP: YES
PATIENT FASTING Y/N/NP: YES
PHOSPHATE SERPL-MCNC: 3.7 MG/DL (ref 2.5–4.9)
PLATELET # BLD AUTO: 345 10(3)UL (ref 150–450)
POTASSIUM SERPL-SCNC: 3.9 MMOL/L (ref 3.5–5.1)
RBC # BLD AUTO: 3.98 X10(6)UL
SODIUM SERPL-SCNC: 142 MMOL/L (ref 136–145)
T4 FREE SERPL-MCNC: 0.9 NG/DL (ref 0.8–1.7)
TOTAL IRON BINDING CAPACITY: 277 UG/DL (ref 240–450)
TRANSFERRIN SERPL-MCNC: 186 MG/DL (ref 200–360)
TRIGL SERPL-MCNC: 68 MG/DL (ref 30–149)
TSI SER-ACNC: 1.94 MIU/ML (ref 0.36–3.74)
VIT B12 SERPL-MCNC: 1189 PG/ML (ref 193–986)
VIT D+METAB SERPL-MCNC: 41.1 NG/ML (ref 30–100)
VLDLC SERPL CALC-MCNC: 12 MG/DL (ref 0–30)
WBC # BLD AUTO: 5.8 X10(3) UL (ref 4–11)

## 2021-08-26 PROCEDURE — 80053 COMPREHEN METABOLIC PANEL: CPT

## 2021-08-26 PROCEDURE — 82607 VITAMIN B-12: CPT

## 2021-08-26 PROCEDURE — 83036 HEMOGLOBIN GLYCOSYLATED A1C: CPT

## 2021-08-26 PROCEDURE — 82306 VITAMIN D 25 HYDROXY: CPT

## 2021-08-26 PROCEDURE — 83550 IRON BINDING TEST: CPT

## 2021-08-26 PROCEDURE — 82728 ASSAY OF FERRITIN: CPT

## 2021-08-26 PROCEDURE — 84100 ASSAY OF PHOSPHORUS: CPT

## 2021-08-26 PROCEDURE — 83735 ASSAY OF MAGNESIUM: CPT

## 2021-08-26 PROCEDURE — 85025 COMPLETE CBC W/AUTO DIFF WBC: CPT

## 2021-08-26 PROCEDURE — 80061 LIPID PANEL: CPT

## 2021-08-26 PROCEDURE — 84439 ASSAY OF FREE THYROXINE: CPT

## 2021-08-26 PROCEDURE — 83540 ASSAY OF IRON: CPT

## 2021-08-26 PROCEDURE — 82746 ASSAY OF FOLIC ACID SERUM: CPT

## 2021-08-26 PROCEDURE — 84443 ASSAY THYROID STIM HORMONE: CPT

## 2021-08-26 PROCEDURE — 84425 ASSAY OF VITAMIN B-1: CPT

## 2021-08-26 PROCEDURE — 36415 COLL VENOUS BLD VENIPUNCTURE: CPT

## 2021-08-27 ENCOUNTER — LAB ENCOUNTER (OUTPATIENT)
Dept: LAB | Age: 56
End: 2021-08-27
Attending: SURGERY
Payer: COMMERCIAL

## 2021-08-27 DIAGNOSIS — R11.11 VOMITING WITHOUT NAUSEA, INTRACTABILITY OF VOMITING NOT SPECIFIED, UNSPECIFIED VOMITING TYPE: ICD-10-CM

## 2021-08-29 LAB — SARS-COV-2 RNA RESP QL NAA+PROBE: NOT DETECTED

## 2021-08-30 ENCOUNTER — HOSPITAL ENCOUNTER (OUTPATIENT)
Dept: GENERAL RADIOLOGY | Facility: HOSPITAL | Age: 56
Discharge: HOME | End: 2021-08-30
Attending: SURGERY
Payer: COMMERCIAL

## 2021-08-30 DIAGNOSIS — E66.01 MORBID OBESITY WITH BMI OF 40.0-44.9, ADULT (HCC): ICD-10-CM

## 2021-08-30 DIAGNOSIS — Z51.81 ENCOUNTER FOR THERAPEUTIC DRUG MONITORING: ICD-10-CM

## 2021-08-30 DIAGNOSIS — F43.9 STRESS: ICD-10-CM

## 2021-08-30 DIAGNOSIS — R11.11 VOMITING WITHOUT NAUSEA, INTRACTABILITY OF VOMITING NOT SPECIFIED, UNSPECIFIED VOMITING TYPE: ICD-10-CM

## 2021-08-30 PROCEDURE — 74240 X-RAY XM UPR GI TRC 1CNTRST: CPT | Performed by: SURGERY

## 2021-08-30 RX ORDER — FLUOXETINE HYDROCHLORIDE 40 MG/1
CAPSULE ORAL
Qty: 90 CAPSULE | Refills: 1 | OUTPATIENT
Start: 2021-08-30

## 2021-08-31 LAB — VITAMIN B1 (THIAMINE), WHOLE B: 64 NMOL/L

## 2021-09-03 ENCOUNTER — NURSE ONLY (OUTPATIENT)
Dept: INTERNAL MEDICINE CLINIC | Facility: CLINIC | Age: 56
End: 2021-09-03
Payer: COMMERCIAL

## 2021-09-03 DIAGNOSIS — E51.9 THIAMINE DEFICIENCY: Primary | ICD-10-CM

## 2021-09-03 PROCEDURE — 96372 THER/PROPH/DIAG INJ SC/IM: CPT | Performed by: PHYSICIAN ASSISTANT

## 2021-09-03 RX ORDER — THIAMINE HYDROCHLORIDE 100 MG/ML
200 INJECTION, SOLUTION INTRAMUSCULAR; INTRAVENOUS ONCE
Status: COMPLETED | OUTPATIENT
Start: 2021-09-03 | End: 2021-09-03

## 2021-09-03 RX ADMIN — THIAMINE HYDROCHLORIDE 200 MG: 100 INJECTION, SOLUTION INTRAMUSCULAR; INTRAVENOUS at 13:30:00

## 2021-10-01 ENCOUNTER — NURSE ONLY (OUTPATIENT)
Dept: INTERNAL MEDICINE CLINIC | Facility: CLINIC | Age: 56
End: 2021-10-01
Payer: COMMERCIAL

## 2021-10-01 DIAGNOSIS — E51.9 THIAMINE DEFICIENCY: Primary | ICD-10-CM

## 2021-10-01 PROCEDURE — 96372 THER/PROPH/DIAG INJ SC/IM: CPT | Performed by: PHYSICIAN ASSISTANT

## 2021-10-01 RX ORDER — THIAMINE HYDROCHLORIDE 100 MG/ML
100 INJECTION, SOLUTION INTRAMUSCULAR; INTRAVENOUS ONCE
Status: COMPLETED | OUTPATIENT
Start: 2021-10-01 | End: 2021-10-01

## 2021-10-01 RX ADMIN — THIAMINE HYDROCHLORIDE 100 MG: 100 INJECTION, SOLUTION INTRAMUSCULAR; INTRAVENOUS at 15:07:00

## 2021-10-12 RX ORDER — PANTOPRAZOLE SODIUM 40 MG/1
40 TABLET, DELAYED RELEASE ORAL DAILY
Qty: 30 TABLET | Refills: 0 | Status: SHIPPED | OUTPATIENT
Start: 2021-10-12 | End: 2022-02-02 | Stop reason: SDUPTHER

## 2021-10-16 DIAGNOSIS — F43.9 STRESS: ICD-10-CM

## 2021-10-16 DIAGNOSIS — E66.01 MORBID OBESITY WITH BMI OF 40.0-44.9, ADULT (HCC): ICD-10-CM

## 2021-10-16 DIAGNOSIS — Z51.81 ENCOUNTER FOR THERAPEUTIC DRUG MONITORING: ICD-10-CM

## 2021-10-18 NOTE — TELEPHONE ENCOUNTER
Requesting   Requested Prescriptions     Pending Prescriptions Disp Refills   • FLUOXETINE 20 MG Oral Cap [Pharmacy Med Name: FLUOXETINE HCL 20 MG CAPSULE] 90 capsule 0     Sig: TAKE 1 CAPSULE BY MOUTH EVERY DAY     LOV: 7/7/21  RTC: 3 months   Filled: 7/1

## 2021-10-19 RX ORDER — FLUOXETINE HYDROCHLORIDE 20 MG/1
CAPSULE ORAL
Qty: 90 CAPSULE | Refills: 0 | Status: SHIPPED | OUTPATIENT
Start: 2021-10-19

## 2021-10-29 ENCOUNTER — LAB SERVICES (OUTPATIENT)
Dept: LAB | Age: 56
End: 2021-10-29

## 2021-10-29 ENCOUNTER — OFFICE VISIT (OUTPATIENT)
Dept: INTERNAL MEDICINE | Age: 56
End: 2021-10-29

## 2021-10-29 VITALS
RESPIRATION RATE: 20 BRPM | BODY MASS INDEX: 34.38 KG/M2 | WEIGHT: 213 LBS | DIASTOLIC BLOOD PRESSURE: 80 MMHG | TEMPERATURE: 98 F | HEART RATE: 49 BPM | OXYGEN SATURATION: 99 % | SYSTOLIC BLOOD PRESSURE: 130 MMHG

## 2021-10-29 DIAGNOSIS — R00.1 BRADYCARDIA: Primary | ICD-10-CM

## 2021-10-29 DIAGNOSIS — I10 PRIMARY HYPERTENSION: ICD-10-CM

## 2021-10-29 DIAGNOSIS — R00.1 BRADYCARDIA: ICD-10-CM

## 2021-10-29 LAB
ALBUMIN SERPL-MCNC: 3.9 G/DL (ref 3.6–5.1)
ALP SERPL-CCNC: 69 U/L (ref 45–130)
ALT SERPL W/O P-5'-P-CCNC: 14 U/L (ref 4–38)
AST SERPL-CCNC: 23 U/L (ref 14–43)
BASOPHIL %: 0.6 % (ref 0–1.2)
BASOPHIL ABSOLUTE #: 0 10*3/UL (ref 0–0.1)
BILIRUB SERPL-MCNC: 0.3 MG/DL (ref 0–1.3)
BUN SERPL-MCNC: 14 MG/DL (ref 7–20)
CALCIUM SERPL-MCNC: 9.7 MG/DL (ref 8.6–10.6)
CHLORIDE SERPL-SCNC: 105 MMOL/L (ref 96–107)
CO2 SERPL-SCNC: 27 MMOL/L (ref 22–32)
CREAT SERPL-MCNC: 0.8 MG/DL (ref 0.5–1.4)
DIFFERENTIAL TYPE: NORMAL
EOSINOPHIL %: 3.1 % (ref 0–10)
EOSINOPHIL ABSOLUTE #: 0.2 10*3/UL (ref 0–0.5)
GFR SERPL CREATININE-BSD FRML MDRD: >60 ML/MIN/{1.73M2}
GFR SERPL CREATININE-BSD FRML MDRD: >60 ML/MIN/{1.73M2}
GLUCOSE SERPL-MCNC: 81 MG/DL (ref 70–200)
HEMATOCRIT: 37.1 % (ref 34–45)
HEMOGLOBIN: 12.2 G/DL (ref 11.2–15.7)
IMMATURE GRANULOCYTE ABSOLUTE: 0.01 10*3/UL (ref 0–0.05)
IMMATURE GRANULOCYTE PERCENT: 0.2 % (ref 0–0.5)
LYMPH PERCENT: 37.5 % (ref 20.5–51.1)
LYMPHOCYTE ABSOLUTE #: 2.4 10*3/UL (ref 1.2–3.4)
MAGNESIUM SERPL-MCNC: 1.9 MG/DL (ref 1.6–2.6)
MEAN CORPUSCULAR HGB CONCENTRATION: 32.9 % (ref 32–36)
MEAN CORPUSCULAR HGB: 30 PG (ref 27–34)
MEAN CORPUSCULAR VOLUME: 91.2 FL (ref 79–95)
MEAN PLATELET VOLUME: 9.9 FL (ref 8.6–12.4)
MONOCYTE ABSOLUTE #: 0.5 10*3/UL (ref 0.2–0.9)
MONOCYTE PERCENT: 7.1 % (ref 4.3–12.9)
NEUTROPHIL ABSOLUTE #: 3.3 10*3/UL (ref 1.4–6.5)
NEUTROPHIL PERCENT: 51.5 % (ref 34–73.5)
PLATELET COUNT: 380 10*3/UL (ref 150–400)
POTASSIUM SERPL-SCNC: 4.2 MMOL/L (ref 3.5–5.3)
PROT SERPL-MCNC: 6.7 G/DL (ref 6.4–8.5)
RED BLOOD CELL COUNT: 4.07 10*6/UL (ref 3.7–5.2)
RED CELL DISTRIBUTION WIDTH: 13.6 % (ref 11.3–14.8)
SODIUM SERPL-SCNC: 139 MMOL/L (ref 136–146)
T4 FREE SERPL-MCNC: 0.87 NG/DL (ref 0.78–2.19)
TSH SERPL DL<=0.05 MIU/L-ACNC: 0.87 M[IU]/L (ref 0.3–4.82)
WHITE BLOOD CELL COUNT: 6.4 10*3/UL (ref 4–10)

## 2021-10-29 PROCEDURE — 3079F DIAST BP 80-89 MM HG: CPT | Performed by: INTERNAL MEDICINE

## 2021-10-29 PROCEDURE — 3075F SYST BP GE 130 - 139MM HG: CPT | Performed by: INTERNAL MEDICINE

## 2021-10-29 PROCEDURE — 84439 ASSAY OF FREE THYROXINE: CPT | Performed by: INTERNAL MEDICINE

## 2021-10-29 PROCEDURE — 36415 COLL VENOUS BLD VENIPUNCTURE: CPT | Performed by: INTERNAL MEDICINE

## 2021-10-29 PROCEDURE — 80050 GENERAL HEALTH PANEL: CPT | Performed by: INTERNAL MEDICINE

## 2021-10-29 PROCEDURE — 93000 ELECTROCARDIOGRAM COMPLETE: CPT | Performed by: INTERNAL MEDICINE

## 2021-10-29 PROCEDURE — 99214 OFFICE O/P EST MOD 30 MIN: CPT | Performed by: INTERNAL MEDICINE

## 2021-10-29 PROCEDURE — 83735 ASSAY OF MAGNESIUM: CPT | Performed by: INTERNAL MEDICINE

## 2021-10-29 SDOH — HEALTH STABILITY: PHYSICAL HEALTH: ON AVERAGE, HOW MANY DAYS PER WEEK DO YOU ENGAGE IN MODERATE TO STRENUOUS EXERCISE (LIKE A BRISK WALK)?: 0 DAYS

## 2021-10-29 SDOH — HEALTH STABILITY: PHYSICAL HEALTH: ON AVERAGE, HOW MANY MINUTES DO YOU ENGAGE IN EXERCISE AT THIS LEVEL?: 0 MIN

## 2021-10-29 ASSESSMENT — PATIENT HEALTH QUESTIONNAIRE - PHQ9
SUM OF ALL RESPONSES TO PHQ9 QUESTIONS 1 AND 2: 0
2. FEELING DOWN, DEPRESSED OR HOPELESS: NOT AT ALL
SUM OF ALL RESPONSES TO PHQ9 QUESTIONS 1 AND 2: 0
CLINICAL INTERPRETATION OF PHQ9 SCORE: NO FURTHER SCREENING NEEDED
SUM OF ALL RESPONSES TO PHQ9 QUESTIONS 1 AND 2: 0
CLINICAL INTERPRETATION OF PHQ2 SCORE: NO FURTHER SCREENING NEEDED
1. LITTLE INTEREST OR PLEASURE IN DOING THINGS: NOT AT ALL

## 2021-11-07 DIAGNOSIS — I10 ESSENTIAL HYPERTENSION: ICD-10-CM

## 2021-11-08 ENCOUNTER — OFFICE VISIT (OUTPATIENT)
Dept: INTERNAL MEDICINE CLINIC | Facility: CLINIC | Age: 56
End: 2021-11-08
Payer: COMMERCIAL

## 2021-11-08 VITALS
WEIGHT: 213 LBS | BODY MASS INDEX: 35.06 KG/M2 | HEART RATE: 50 BPM | DIASTOLIC BLOOD PRESSURE: 82 MMHG | HEIGHT: 65.5 IN | SYSTOLIC BLOOD PRESSURE: 130 MMHG | RESPIRATION RATE: 16 BRPM

## 2021-11-08 DIAGNOSIS — R00.1 BRADYCARDIA: ICD-10-CM

## 2021-11-08 DIAGNOSIS — I10 ESSENTIAL HYPERTENSION: ICD-10-CM

## 2021-11-08 DIAGNOSIS — Z13.21 ENCOUNTER FOR VITAMIN DEFICIENCY SCREENING: ICD-10-CM

## 2021-11-08 DIAGNOSIS — Z90.3 HISTORY OF SLEEVE GASTRECTOMY: ICD-10-CM

## 2021-11-08 DIAGNOSIS — E51.9 THIAMINE DEFICIENCY: ICD-10-CM

## 2021-11-08 DIAGNOSIS — E66.01 MORBID OBESITY WITH BMI OF 40.0-44.9, ADULT (HCC): ICD-10-CM

## 2021-11-08 DIAGNOSIS — Z51.81 ENCOUNTER FOR THERAPEUTIC DRUG MONITORING: Primary | ICD-10-CM

## 2021-11-08 PROCEDURE — 3008F BODY MASS INDEX DOCD: CPT | Performed by: NURSE PRACTITIONER

## 2021-11-08 PROCEDURE — 99214 OFFICE O/P EST MOD 30 MIN: CPT | Performed by: NURSE PRACTITIONER

## 2021-11-08 PROCEDURE — 3075F SYST BP GE 130 - 139MM HG: CPT | Performed by: NURSE PRACTITIONER

## 2021-11-08 PROCEDURE — 3079F DIAST BP 80-89 MM HG: CPT | Performed by: NURSE PRACTITIONER

## 2021-11-08 NOTE — PROGRESS NOTES
Jarrell Rothman is a 55year old female presents today for follow-up on medical weight loss program for the treatment of overweight, obesity, or morbid obesity with associated HTN, OA, GERD, .    S:  Current weight Wt Readings from Last 6 Encounters:  11/ conjunctiva pink, sclera non icteric, PERRLA  LUNGS: CTA in all fields, breathing non labored  CARDIO: regular rhythm without murmur, normal S1 and S2 without clicks or gallops, no pedal edema.   GI: +BS  NEURO/MS: motor and sensory grossly intact  PSYCH: p keeping a food and hydration journal to bring to your cardiology visit. Continue with healthy behaviors and being your own self advocate.     Though many people may be familiar with advocating for their child, an elderly parent or a cause they care deeply a fitness center so I can continue my workouts? • Can I bring a favorite, healthy dish to your party?   2 – With Work Colleagues  As we are transitioning to going back into the office, the workplace presents other challenges to our health that we need to add Coalition, an organization that does a wonderful job advocating for those who are impacted by the disease of obesity. Please visit their website to learn more and become a member. Kwame Lazcano MD    This tip sheet has been taken from www. [email or web address]

## 2021-11-08 NOTE — PATIENT INSTRUCTIONS
Continue making lifestyle changes that focus on good nutrition, regular exercise and stress management. Medication Plan: Continue bariatric vitamin. Next steps to work on before next office visit include: Heart rate low today, 50. Normal is .  C special occasions? • I'm looking for a morning walking zaynab to help me be more accountable. Will you join me? • Can we choose a different restaurant for lunch that has healthier options?   • Would you consider splitting an entree with me and we can each offensive. I would like to have a more collaborative relationship with you when it comes to my weight. Are you ok with that?   Having stronger self-advocacy skills will help you fight weight bias and find health care providers who can partner with you in yo

## 2021-11-09 RX ORDER — LISINOPRIL 20 MG/1
TABLET ORAL
Qty: 90 TABLET | Refills: 1 | Status: SHIPPED | OUTPATIENT
Start: 2021-11-09 | End: 2022-08-24

## 2021-11-15 ENCOUNTER — ANCILLARY PROCEDURE (OUTPATIENT)
Dept: CARDIOLOGY | Age: 56
End: 2021-11-15
Attending: INTERNAL MEDICINE

## 2021-11-15 DIAGNOSIS — I10 PRIMARY HYPERTENSION: ICD-10-CM

## 2021-11-15 DIAGNOSIS — R00.1 BRADYCARDIA: ICD-10-CM

## 2021-11-15 PROCEDURE — 93306 TTE W/DOPPLER COMPLETE: CPT | Performed by: INTERNAL MEDICINE

## 2021-11-16 ENCOUNTER — TELEPHONE (OUTPATIENT)
Dept: CARDIOLOGY | Age: 56
End: 2021-11-16

## 2021-11-16 DIAGNOSIS — I25.3 ATRIAL SEPTAL ANEURYSM: Primary | ICD-10-CM

## 2021-11-17 ENCOUNTER — OFFICE VISIT (OUTPATIENT)
Dept: INTERNAL MEDICINE CLINIC | Facility: CLINIC | Age: 56
End: 2021-11-17
Payer: COMMERCIAL

## 2021-11-17 VITALS — WEIGHT: 214 LBS | HEIGHT: 65.5 IN | BODY MASS INDEX: 35.23 KG/M2

## 2021-11-17 DIAGNOSIS — E66.01 CLASS 2 SEVERE OBESITY DUE TO EXCESS CALORIES WITH SERIOUS COMORBIDITY AND BODY MASS INDEX (BMI) OF 35.0 TO 35.9 IN ADULT (HCC): Primary | ICD-10-CM

## 2021-11-17 PROCEDURE — 97803 MED NUTRITION INDIV SUBSEQ: CPT | Performed by: DIETITIAN, REGISTERED

## 2021-11-17 PROCEDURE — 3008F BODY MASS INDEX DOCD: CPT | Performed by: DIETITIAN, REGISTERED

## 2021-11-17 NOTE — PROGRESS NOTES
[facility]  [address] [place] [address]  Dept: [phone]  Loc: [phone]    11/17/21      Bariatric Follow-up Nutrition Session    Jarrell Rothman is a 55year old female.      As 1,189 (H) 08/26/2021     Lab Results   Component Value Date    VITD 41.1 08/26/2021     Lab Results   Component Value Date/Time    THIAMINE 64 (L) 08/26/2021 07:49 AM      No results found for: VITB1  Lab Results   Component Value Date/Time    FOLIC 8.3 (L fiber    Patient has made some modifications and adjustments to diet: yes, is sticking w/ moderate portions, keeping consistent food records, prioritizing protein, and limiting starch intake    Food intolerances: Dairy, solids  Vitamin/mineral supplements:

## 2021-11-24 RX ORDER — BUTALBITAL, ACETAMINOPHEN AND CAFFEINE 50; 325; 40 MG/1; MG/1; MG/1
1 TABLET ORAL EVERY 6 HOURS PRN
Qty: 30 TABLET | Refills: 0 | Status: SHIPPED | OUTPATIENT
Start: 2021-11-24 | End: 2022-02-02 | Stop reason: SDUPTHER

## 2021-11-24 RX ORDER — FEXOFENADINE HCL 180 MG/1
180 TABLET ORAL DAILY
COMMUNITY
End: 2022-02-02 | Stop reason: ALTCHOICE

## 2021-11-24 RX ORDER — FLUOXETINE HYDROCHLORIDE 20 MG/1
1 CAPSULE ORAL DAILY
COMMUNITY
Start: 2021-10-19 | End: 2022-09-07 | Stop reason: ALTCHOICE

## 2021-11-26 RX ORDER — BUTALBITAL, ACETAMINOPHEN AND CAFFEINE 50; 325; 40 MG/1; MG/1; MG/1
1 TABLET ORAL EVERY 6 HOURS PRN
Qty: 30 TABLET | Refills: 0 | OUTPATIENT
Start: 2021-11-26

## 2021-12-02 ENCOUNTER — ANCILLARY PROCEDURE (OUTPATIENT)
Dept: CARDIOLOGY | Age: 56
End: 2021-12-02
Attending: INTERNAL MEDICINE

## 2021-12-02 ENCOUNTER — TELEPHONE (OUTPATIENT)
Dept: CARDIOLOGY | Age: 56
End: 2021-12-02

## 2021-12-02 ENCOUNTER — OFFICE VISIT (OUTPATIENT)
Dept: CARDIOLOGY | Age: 56
End: 2021-12-02
Attending: INTERNAL MEDICINE

## 2021-12-02 VITALS
RESPIRATION RATE: 16 BRPM | DIASTOLIC BLOOD PRESSURE: 70 MMHG | WEIGHT: 209.2 LBS | OXYGEN SATURATION: 100 % | SYSTOLIC BLOOD PRESSURE: 120 MMHG | HEIGHT: 66 IN | BODY MASS INDEX: 33.62 KG/M2 | HEART RATE: 48 BPM

## 2021-12-02 DIAGNOSIS — Z01.812 PRE-PROCEDURAL LABORATORY EXAMINATION: Primary | ICD-10-CM

## 2021-12-02 DIAGNOSIS — I10 PRIMARY HYPERTENSION: ICD-10-CM

## 2021-12-02 DIAGNOSIS — R00.1 BRADYCARDIA: Primary | ICD-10-CM

## 2021-12-02 DIAGNOSIS — R94.31 ABNORMAL ELECTROCARDIOGRAM (ECG) (EKG): ICD-10-CM

## 2021-12-02 DIAGNOSIS — R73.01 IMPAIRED FASTING GLUCOSE: ICD-10-CM

## 2021-12-02 DIAGNOSIS — R00.1 BRADYCARDIA: ICD-10-CM

## 2021-12-02 DIAGNOSIS — E55.9 VITAMIN D DEFICIENCY: ICD-10-CM

## 2021-12-02 PROCEDURE — 99214 OFFICE O/P EST MOD 30 MIN: CPT | Performed by: INTERNAL MEDICINE

## 2021-12-02 PROCEDURE — 93000 ELECTROCARDIOGRAM COMPLETE: CPT | Performed by: INTERNAL MEDICINE

## 2021-12-02 PROCEDURE — 93268 ECG RECORD/REVIEW: CPT | Performed by: INTERNAL MEDICINE

## 2021-12-02 ASSESSMENT — PATIENT HEALTH QUESTIONNAIRE - PHQ9
CLINICAL INTERPRETATION OF PHQ2 SCORE: NO FURTHER SCREENING NEEDED
SUM OF ALL RESPONSES TO PHQ9 QUESTIONS 1 AND 2: 0
SUM OF ALL RESPONSES TO PHQ9 QUESTIONS 1 AND 2: 0
1. LITTLE INTEREST OR PLEASURE IN DOING THINGS: NOT AT ALL
2. FEELING DOWN, DEPRESSED OR HOPELESS: NOT AT ALL

## 2021-12-06 ENCOUNTER — TELEPHONE (OUTPATIENT)
Dept: SURGERY | Facility: CLINIC | Age: 56
End: 2021-12-06

## 2021-12-21 ENCOUNTER — ANCILLARY PROCEDURE (OUTPATIENT)
Dept: CARDIOLOGY | Age: 56
End: 2021-12-21
Attending: INTERNAL MEDICINE

## 2021-12-21 ENCOUNTER — TELEPHONE (OUTPATIENT)
Dept: CARDIOLOGY | Age: 56
End: 2021-12-21

## 2021-12-21 DIAGNOSIS — I25.3 ATRIAL SEPTAL ANEURYSM: ICD-10-CM

## 2021-12-21 PROCEDURE — 93308 TTE F-UP OR LMTD: CPT | Performed by: INTERNAL MEDICINE

## 2021-12-26 ENCOUNTER — LAB SERVICES (OUTPATIENT)
Dept: LAB | Age: 56
End: 2021-12-26

## 2021-12-26 DIAGNOSIS — Z01.812 PRE-PROCEDURAL LABORATORY EXAMINATION: ICD-10-CM

## 2021-12-26 LAB
SARS-COV-2 RNA RESP QL NAA+PROBE: NOT DETECTED
SERVICE CMNT-IMP: NORMAL
SERVICE CMNT-IMP: NORMAL

## 2021-12-26 PROCEDURE — U0003 INFECTIOUS AGENT DETECTION BY NUCLEIC ACID (DNA OR RNA); SEVERE ACUTE RESPIRATORY SYNDROME CORONAVIRUS 2 (SARS-COV-2) (CORONAVIRUS DISEASE [COVID-19]), AMPLIFIED PROBE TECHNIQUE, MAKING USE OF HIGH THROUGHPUT TECHNOLOGIES AS DESCRIBED BY CMS-2020-01-R: HCPCS | Performed by: INTERNAL MEDICINE

## 2021-12-26 PROCEDURE — U0005 INFEC AGEN DETEC AMPLI PROBE: HCPCS | Performed by: INTERNAL MEDICINE

## 2021-12-28 ENCOUNTER — ANCILLARY PROCEDURE (OUTPATIENT)
Dept: CARDIOLOGY | Age: 56
End: 2021-12-28
Attending: INTERNAL MEDICINE

## 2021-12-28 ENCOUNTER — ANCILLARY PROCEDURE (OUTPATIENT)
Dept: GENERAL RADIOLOGY | Age: 56
End: 2021-12-28
Attending: INTERNAL MEDICINE

## 2021-12-28 ENCOUNTER — TELEPHONE (OUTPATIENT)
Dept: CARDIOLOGY | Age: 56
End: 2021-12-28

## 2021-12-28 DIAGNOSIS — R00.1 BRADYCARDIA: ICD-10-CM

## 2021-12-28 DIAGNOSIS — R94.31 ABNORMAL ELECTROCARDIOGRAM (ECG) (EKG): ICD-10-CM

## 2021-12-28 DIAGNOSIS — I10 PRIMARY HYPERTENSION: ICD-10-CM

## 2021-12-28 PROCEDURE — A9500 TC99M SESTAMIBI: HCPCS | Performed by: INTERNAL MEDICINE

## 2021-12-28 PROCEDURE — 78452 HT MUSCLE IMAGE SPECT MULT: CPT | Performed by: INTERNAL MEDICINE

## 2021-12-28 PROCEDURE — 93015 CV STRESS TEST SUPVJ I&R: CPT | Performed by: INTERNAL MEDICINE

## 2021-12-28 PROCEDURE — G1004 CDSM NDSC: HCPCS | Performed by: INTERNAL MEDICINE

## 2021-12-28 RX ORDER — TETRAKIS(2-METHOXYISOBUTYLISOCYANIDE)COPPER(I) TETRAFLUOROBORATE 1 MG/ML
8 INJECTION, POWDER, LYOPHILIZED, FOR SOLUTION INTRAVENOUS ONCE
Status: COMPLETED | OUTPATIENT
Start: 2021-12-28 | End: 2021-12-28

## 2021-12-28 RX ORDER — TETRAKIS(2-METHOXYISOBUTYLISOCYANIDE)COPPER(I) TETRAFLUOROBORATE 1 MG/ML
24 INJECTION, POWDER, LYOPHILIZED, FOR SOLUTION INTRAVENOUS ONCE
Status: COMPLETED | OUTPATIENT
Start: 2021-12-28 | End: 2021-12-28

## 2021-12-28 RX ADMIN — TETRAKIS(2-METHOXYISOBUTYLISOCYANIDE)COPPER(I) TETRAFLUOROBORATE 24.5 MILLICURIE: 1 INJECTION, POWDER, LYOPHILIZED, FOR SOLUTION INTRAVENOUS at 11:15

## 2021-12-28 RX ADMIN — TETRAKIS(2-METHOXYISOBUTYLISOCYANIDE)COPPER(I) TETRAFLUOROBORATE 8.8 MILLICURIE: 1 INJECTION, POWDER, LYOPHILIZED, FOR SOLUTION INTRAVENOUS at 10:00

## 2021-12-29 ENCOUNTER — TELEPHONE (OUTPATIENT)
Dept: CARDIOLOGY | Age: 56
End: 2021-12-29

## 2021-12-29 DIAGNOSIS — R00.1 BRADYCARDIA, UNSPECIFIED: ICD-10-CM

## 2021-12-29 DIAGNOSIS — R94.39 ABNORMAL NUCLEAR STRESS TEST: Primary | ICD-10-CM

## 2021-12-29 DIAGNOSIS — Z01.810 PREOP CARDIOVASCULAR EXAM: ICD-10-CM

## 2021-12-29 LAB — STRESS TARGET HR: 165 BPM

## 2022-01-03 ENCOUNTER — TELEPHONE (OUTPATIENT)
Dept: SURGERY | Age: 57
End: 2022-01-03

## 2022-01-03 DIAGNOSIS — R92.0 MAMMOGRAPHIC MICROCALCIFICATION: Primary | ICD-10-CM

## 2022-01-04 ENCOUNTER — TELEPHONE (OUTPATIENT)
Dept: CT IMAGING | Age: 57
End: 2022-01-04

## 2022-01-17 ENCOUNTER — IMAGING SERVICES (OUTPATIENT)
Dept: ULTRASOUND IMAGING | Age: 57
End: 2022-01-17
Attending: SURGERY

## 2022-01-17 ENCOUNTER — IMAGING SERVICES (OUTPATIENT)
Dept: MAMMOGRAPHY | Age: 57
End: 2022-01-17
Attending: SURGERY

## 2022-01-17 DIAGNOSIS — R92.0 MAMMOGRAPHIC MICROCALCIFICATION: ICD-10-CM

## 2022-01-17 PROCEDURE — 76642 ULTRASOUND BREAST LIMITED: CPT | Performed by: RADIOLOGY

## 2022-01-17 PROCEDURE — 77066 DX MAMMO INCL CAD BI: CPT | Performed by: RADIOLOGY

## 2022-01-20 ENCOUNTER — APPOINTMENT (OUTPATIENT)
Dept: CARDIOLOGY | Age: 57
End: 2022-01-20

## 2022-01-21 ENCOUNTER — LAB REQUISITION (OUTPATIENT)
Dept: LAB | Age: 57
End: 2022-01-21

## 2022-01-21 ENCOUNTER — LAB SERVICES (OUTPATIENT)
Dept: LAB | Age: 57
End: 2022-01-21

## 2022-01-21 DIAGNOSIS — I10 ESSENTIAL (PRIMARY) HYPERTENSION: ICD-10-CM

## 2022-01-21 DIAGNOSIS — R73.01 IMPAIRED FASTING GLUCOSE: ICD-10-CM

## 2022-01-21 DIAGNOSIS — I10 PRIMARY HYPERTENSION: ICD-10-CM

## 2022-01-21 DIAGNOSIS — E55.9 VITAMIN D DEFICIENCY: ICD-10-CM

## 2022-01-21 DIAGNOSIS — E55.9 VITAMIN D DEFICIENCY, UNSPECIFIED: ICD-10-CM

## 2022-01-21 DIAGNOSIS — R00.1 BRADYCARDIA, UNSPECIFIED: ICD-10-CM

## 2022-01-21 DIAGNOSIS — R00.1 BRADYCARDIA: ICD-10-CM

## 2022-01-21 DIAGNOSIS — R94.31 ABNORMAL ELECTROCARDIOGRAM (ECG) (EKG): ICD-10-CM

## 2022-01-21 LAB
ALBUMIN SERPL-MCNC: 3.8 G/DL (ref 3.6–5.1)
ALP SERPL-CCNC: 63 U/L (ref 45–130)
ALT SERPL W/O P-5'-P-CCNC: 16 U/L (ref 4–38)
AST SERPL-CCNC: 24 U/L (ref 14–43)
BASOPHIL %: 1.1 % (ref 0–1.2)
BASOPHIL ABSOLUTE #: 0.1 10*3/UL (ref 0–0.1)
BILIRUB SERPL-MCNC: 0.5 MG/DL (ref 0–1.3)
BUN SERPL-MCNC: 12 MG/DL (ref 7–20)
CALCIUM SERPL-MCNC: 9.8 MG/DL (ref 8.6–10.6)
CHLORIDE SERPL-SCNC: 108 MMOL/L (ref 96–107)
CHOLEST SERPL-MCNC: 209 MG/DL (ref 140–200)
CO2 SERPL-SCNC: 29 MMOL/L (ref 22–32)
CREAT SERPL-MCNC: 0.9 MG/DL (ref 0.5–1.4)
DIFFERENTIAL TYPE: ABNORMAL
EOSINOPHIL %: 2.6 % (ref 0–10)
EOSINOPHIL ABSOLUTE #: 0.2 10*3/UL (ref 0–0.5)
EST. AVERAGE GLUCOSE BLD GHB EST-MCNC: 97 MG/DL (ref 0–154)
GFR SERPL CREATININE-BSD FRML MDRD: >60 ML/MIN/{1.73M2}
GFR SERPL CREATININE-BSD FRML MDRD: >60 ML/MIN/{1.73M2}
GLUCOSE P FAST SERPL-MCNC: 86 MG/DL (ref 60–100)
HBA1C MFR BLD: 5 % (ref 4.2–6)
HDLC SERPL-MCNC: 74 MG/DL
HEMATOCRIT: 37.5 % (ref 34–45)
HEMOGLOBIN: 12.2 G/DL (ref 11.2–15.7)
IMMATURE GRANULOCYTE ABSOLUTE: 0 10*3/UL (ref 0–0.05)
IMMATURE GRANULOCYTE PERCENT: 0 % (ref 0–0.5)
LDLC SERPL CALC-MCNC: 123 MG/DL (ref 30–100)
LYMPH PERCENT: 48.7 % (ref 20.5–51.1)
LYMPHOCYTE ABSOLUTE #: 2.8 10*3/UL (ref 1.2–3.4)
MAGNESIUM SERPL-MCNC: 1.9 MG/DL (ref 1.6–2.6)
MEAN CORPUSCULAR HGB CONCENTRATION: 32.5 % (ref 32–36)
MEAN CORPUSCULAR HGB: 30.3 PG (ref 27–34)
MEAN CORPUSCULAR VOLUME: 93.1 FL (ref 79–95)
MEAN PLATELET VOLUME: 9.3 FL (ref 8.6–12.4)
MONOCYTE ABSOLUTE #: 0.5 10*3/UL (ref 0.2–0.9)
MONOCYTE PERCENT: 8.5 % (ref 4.3–12.9)
NEUTROPHIL ABSOLUTE #: 2.2 10*3/UL (ref 1.4–6.5)
NEUTROPHIL PERCENT: 39.1 % (ref 34–73.5)
PLATELET COUNT: 421 10*3/UL (ref 150–400)
POTASSIUM SERPL-SCNC: 4.3 MMOL/L (ref 3.5–5.3)
PROT SERPL-MCNC: 6.7 G/DL (ref 6.4–8.5)
RED BLOOD CELL COUNT: 4.03 10*6/UL (ref 3.7–5.2)
RED CELL DISTRIBUTION WIDTH: 13.4 % (ref 11.3–14.8)
SODIUM SERPL-SCNC: 138 MMOL/L (ref 136–146)
TRIGL SERPL-MCNC: 61 MG/DL (ref 0–200)
TSH SERPL DL<=0.05 MIU/L-ACNC: 2.11 M[IU]/L (ref 0.3–4.82)
WHITE BLOOD CELL COUNT: 5.7 10*3/UL (ref 4–10)

## 2022-01-21 PROCEDURE — 80061 LIPID PANEL: CPT | Performed by: INTERNAL MEDICINE

## 2022-01-21 PROCEDURE — 83695 ASSAY OF LIPOPROTEIN(A): CPT | Performed by: INTERNAL MEDICINE

## 2022-01-21 PROCEDURE — PSEU9063 25 HYDROXY VITAMIN D2 AND D3: Performed by: CLINICAL MEDICAL LABORATORY

## 2022-01-21 PROCEDURE — 82306 VITAMIN D 25 HYDROXY: CPT | Performed by: CLINICAL MEDICAL LABORATORY

## 2022-01-21 PROCEDURE — 83735 ASSAY OF MAGNESIUM: CPT | Performed by: INTERNAL MEDICINE

## 2022-01-21 PROCEDURE — 80050 GENERAL HEALTH PANEL: CPT | Performed by: INTERNAL MEDICINE

## 2022-01-21 PROCEDURE — 83036 HEMOGLOBIN GLYCOSYLATED A1C: CPT | Performed by: INTERNAL MEDICINE

## 2022-01-21 PROCEDURE — 83695 ASSAY OF LIPOPROTEIN(A): CPT | Performed by: CLINICAL MEDICAL LABORATORY

## 2022-01-21 PROCEDURE — 36415 COLL VENOUS BLD VENIPUNCTURE: CPT | Performed by: INTERNAL MEDICINE

## 2022-01-21 PROCEDURE — PSEU9650 LIPOPROTEIN(A): Performed by: CLINICAL MEDICAL LABORATORY

## 2022-01-21 PROCEDURE — 82306 VITAMIN D 25 HYDROXY: CPT | Performed by: INTERNAL MEDICINE

## 2022-01-22 DIAGNOSIS — R92.8 ABNORMAL MAMMOGRAM: Primary | ICD-10-CM

## 2022-01-22 DIAGNOSIS — R92.8 ABNORMAL ULTRASOUND OF BREAST: ICD-10-CM

## 2022-01-24 LAB
LPA SERPL-MCNC: 56 MG/DL
LPA SERPL-MCNC: 56 MG/DL

## 2022-01-25 ENCOUNTER — E-ADVICE (OUTPATIENT)
Dept: CARDIOLOGY | Age: 57
End: 2022-01-25

## 2022-01-25 ENCOUNTER — TELEPHONE (OUTPATIENT)
Dept: CARDIOLOGY | Age: 57
End: 2022-01-25

## 2022-01-26 LAB
25(OH)D2 SERPL-MCNC: <1 NG/ML
25(OH)D2 SERPL-MCNC: <1 NG/ML
25(OH)D3 SERPL-MCNC: 59.7 NG/ML
25(OH)D3+25(OH)D2 SERPL-MCNC: 59.7 NG/ML (ref 30–80)
25(OH)D3+25(OH)D2 SERPL-MCNC: 59.7 NG/ML (ref 30–80)
VITAMIN D3 SERPL-MCNC: 59.7 NG/ML

## 2022-01-27 ENCOUNTER — E-ADVICE (OUTPATIENT)
Dept: CT IMAGING | Age: 57
End: 2022-01-27

## 2022-01-28 ENCOUNTER — IMAGING SERVICES (OUTPATIENT)
Dept: CT IMAGING | Age: 57
End: 2022-01-28
Attending: INTERNAL MEDICINE

## 2022-01-28 DIAGNOSIS — R94.39 ABNORMAL NUCLEAR STRESS TEST: ICD-10-CM

## 2022-01-28 DIAGNOSIS — R00.1 BRADYCARDIA, UNSPECIFIED: ICD-10-CM

## 2022-01-28 PROCEDURE — G1004 CDSM NDSC: HCPCS | Performed by: RADIOLOGY

## 2022-01-28 PROCEDURE — 75574 CT ANGIO HRT W/3D IMAGE: CPT | Performed by: RADIOLOGY

## 2022-02-01 ENCOUNTER — OFFICE VISIT (OUTPATIENT)
Dept: CARDIOLOGY | Age: 57
End: 2022-02-01

## 2022-02-01 VITALS
BODY MASS INDEX: 33.04 KG/M2 | OXYGEN SATURATION: 99 % | HEIGHT: 66 IN | SYSTOLIC BLOOD PRESSURE: 122 MMHG | WEIGHT: 205.6 LBS | RESPIRATION RATE: 16 BRPM | DIASTOLIC BLOOD PRESSURE: 76 MMHG | HEART RATE: 56 BPM

## 2022-02-01 DIAGNOSIS — R00.1 BRADYCARDIA, UNSPECIFIED: ICD-10-CM

## 2022-02-01 DIAGNOSIS — E78.2 MIXED HYPERLIPIDEMIA: ICD-10-CM

## 2022-02-01 DIAGNOSIS — I10 ESSENTIAL HYPERTENSION: ICD-10-CM

## 2022-02-01 DIAGNOSIS — E78.41 ELEVATED LIPOPROTEIN(A): Primary | ICD-10-CM

## 2022-02-01 DIAGNOSIS — R94.39 ABNORMAL NUCLEAR STRESS TEST: ICD-10-CM

## 2022-02-01 PROCEDURE — 3078F DIAST BP <80 MM HG: CPT | Performed by: INTERNAL MEDICINE

## 2022-02-01 PROCEDURE — 3074F SYST BP LT 130 MM HG: CPT | Performed by: INTERNAL MEDICINE

## 2022-02-01 PROCEDURE — 99214 OFFICE O/P EST MOD 30 MIN: CPT | Performed by: INTERNAL MEDICINE

## 2022-02-01 ASSESSMENT — PATIENT HEALTH QUESTIONNAIRE - PHQ9
SUM OF ALL RESPONSES TO PHQ9 QUESTIONS 1 AND 2: 0
1. LITTLE INTEREST OR PLEASURE IN DOING THINGS: NOT AT ALL
CLINICAL INTERPRETATION OF PHQ2 SCORE: NO FURTHER SCREENING NEEDED
SUM OF ALL RESPONSES TO PHQ9 QUESTIONS 1 AND 2: 0
2. FEELING DOWN, DEPRESSED OR HOPELESS: NOT AT ALL

## 2022-02-02 ENCOUNTER — OFFICE VISIT (OUTPATIENT)
Dept: INTERNAL MEDICINE | Age: 57
End: 2022-02-02

## 2022-02-02 VITALS
TEMPERATURE: 97.4 F | BODY MASS INDEX: 32.95 KG/M2 | OXYGEN SATURATION: 99 % | HEART RATE: 55 BPM | WEIGHT: 205 LBS | RESPIRATION RATE: 16 BRPM | DIASTOLIC BLOOD PRESSURE: 74 MMHG | SYSTOLIC BLOOD PRESSURE: 126 MMHG | HEIGHT: 66 IN

## 2022-02-02 DIAGNOSIS — K22.719 BARRETT'S ESOPHAGUS WITH DYSPLASIA: ICD-10-CM

## 2022-02-02 DIAGNOSIS — G43.009 MIGRAINE WITHOUT AURA AND WITHOUT STATUS MIGRAINOSUS, NOT INTRACTABLE: ICD-10-CM

## 2022-02-02 DIAGNOSIS — Z00.00 ROUTINE GENERAL MEDICAL EXAMINATION AT HEALTH CARE FACILITY: Primary | ICD-10-CM

## 2022-02-02 PROCEDURE — 3078F DIAST BP <80 MM HG: CPT | Performed by: INTERNAL MEDICINE

## 2022-02-02 PROCEDURE — 99396 PREV VISIT EST AGE 40-64: CPT | Performed by: INTERNAL MEDICINE

## 2022-02-02 PROCEDURE — 3074F SYST BP LT 130 MM HG: CPT | Performed by: INTERNAL MEDICINE

## 2022-02-02 RX ORDER — BUTALBITAL, ACETAMINOPHEN AND CAFFEINE 50; 325; 40 MG/1; MG/1; MG/1
1 TABLET ORAL EVERY 6 HOURS PRN
Qty: 30 TABLET | Refills: 0 | Status: SHIPPED | OUTPATIENT
Start: 2022-02-02 | End: 2022-03-25

## 2022-02-02 RX ORDER — PANTOPRAZOLE SODIUM 40 MG/1
40 TABLET, DELAYED RELEASE ORAL DAILY
Qty: 90 TABLET | Refills: 1 | Status: SHIPPED | OUTPATIENT
Start: 2022-02-02 | End: 2022-08-24 | Stop reason: SDUPTHER

## 2022-02-02 RX ORDER — CETIRIZINE HYDROCHLORIDE 10 MG/1
10 TABLET ORAL DAILY
Qty: 30 TABLET | Refills: 5 | Status: SHIPPED | COMMUNITY
Start: 2022-02-02

## 2022-02-02 SDOH — HEALTH STABILITY: PHYSICAL HEALTH: ON AVERAGE, HOW MANY DAYS PER WEEK DO YOU ENGAGE IN MODERATE TO STRENUOUS EXERCISE (LIKE A BRISK WALK)?: 3 DAYS

## 2022-02-02 SDOH — HEALTH STABILITY: PHYSICAL HEALTH: ON AVERAGE, HOW MANY MINUTES DO YOU ENGAGE IN EXERCISE AT THIS LEVEL?: 30 MIN

## 2022-02-02 ASSESSMENT — PATIENT HEALTH QUESTIONNAIRE - PHQ9
SUM OF ALL RESPONSES TO PHQ9 QUESTIONS 1 AND 2: 0
SUM OF ALL RESPONSES TO PHQ9 QUESTIONS 1 AND 2: 0
CLINICAL INTERPRETATION OF PHQ2 SCORE: NO FURTHER SCREENING NEEDED
2. FEELING DOWN, DEPRESSED OR HOPELESS: NOT AT ALL
1. LITTLE INTEREST OR PLEASURE IN DOING THINGS: NOT AT ALL

## 2022-02-22 ENCOUNTER — APPOINTMENT (OUTPATIENT)
Dept: CARDIOLOGY | Age: 57
End: 2022-02-22

## 2022-03-25 DIAGNOSIS — G43.009 MIGRAINE WITHOUT AURA AND WITHOUT STATUS MIGRAINOSUS, NOT INTRACTABLE: ICD-10-CM

## 2022-03-25 RX ORDER — BUTALBITAL, ACETAMINOPHEN AND CAFFEINE 50; 325; 40 MG/1; MG/1; MG/1
1 TABLET ORAL EVERY 6 HOURS PRN
Qty: 30 TABLET | Refills: 0 | Status: SHIPPED | OUTPATIENT
Start: 2022-03-25 | End: 2022-08-01

## 2022-05-24 ENCOUNTER — LABORATORY ENCOUNTER (OUTPATIENT)
Dept: LAB | Age: 57
End: 2022-05-24
Attending: SURGERY
Payer: COMMERCIAL

## 2022-05-24 ENCOUNTER — OFFICE VISIT (OUTPATIENT)
Dept: SURGERY | Facility: CLINIC | Age: 57
End: 2022-05-24
Payer: COMMERCIAL

## 2022-05-24 ENCOUNTER — TELEPHONE (OUTPATIENT)
Dept: INTERNAL MEDICINE CLINIC | Facility: CLINIC | Age: 57
End: 2022-05-24

## 2022-05-24 VITALS
DIASTOLIC BLOOD PRESSURE: 79 MMHG | HEART RATE: 46 BPM | OXYGEN SATURATION: 100 % | WEIGHT: 205.81 LBS | BODY MASS INDEX: 33.47 KG/M2 | SYSTOLIC BLOOD PRESSURE: 136 MMHG | HEIGHT: 65.8 IN

## 2022-05-24 DIAGNOSIS — R00.1 BRADYCARDIA: ICD-10-CM

## 2022-05-24 DIAGNOSIS — Z90.3 HISTORY OF SLEEVE GASTRECTOMY: ICD-10-CM

## 2022-05-24 DIAGNOSIS — Z51.81 ENCOUNTER FOR THERAPEUTIC DRUG MONITORING: ICD-10-CM

## 2022-05-24 DIAGNOSIS — I10 ESSENTIAL HYPERTENSION: ICD-10-CM

## 2022-05-24 DIAGNOSIS — E66.01 CLASS 2 SEVERE OBESITY DUE TO EXCESS CALORIES WITH SERIOUS COMORBIDITY AND BODY MASS INDEX (BMI) OF 35.0 TO 35.9 IN ADULT (HCC): Primary | ICD-10-CM

## 2022-05-24 DIAGNOSIS — E66.01 MORBID OBESITY WITH BMI OF 40.0-44.9, ADULT (HCC): ICD-10-CM

## 2022-05-24 DIAGNOSIS — Z13.21 ENCOUNTER FOR VITAMIN DEFICIENCY SCREENING: ICD-10-CM

## 2022-05-24 DIAGNOSIS — E66.01 CLASS 2 SEVERE OBESITY DUE TO EXCESS CALORIES WITH SERIOUS COMORBIDITY AND BODY MASS INDEX (BMI) OF 35.0 TO 35.9 IN ADULT (HCC): ICD-10-CM

## 2022-05-24 LAB
ALBUMIN SERPL-MCNC: 3.4 G/DL (ref 3.4–5)
ALBUMIN/GLOB SERPL: 1 {RATIO} (ref 1–2)
ALP LIVER SERPL-CCNC: 65 U/L
ALT SERPL-CCNC: 20 U/L
ANION GAP SERPL CALC-SCNC: 6 MMOL/L (ref 0–18)
AST SERPL-CCNC: 13 U/L (ref 15–37)
BASOPHILS # BLD AUTO: 0.05 X10(3) UL (ref 0–0.2)
BASOPHILS NFR BLD AUTO: 0.8 %
BILIRUB SERPL-MCNC: 0.4 MG/DL (ref 0.1–2)
BUN BLD-MCNC: 9 MG/DL (ref 7–18)
CALCIUM BLD-MCNC: 9.2 MG/DL (ref 8.5–10.1)
CHLORIDE SERPL-SCNC: 110 MMOL/L (ref 98–112)
CO2 SERPL-SCNC: 25 MMOL/L (ref 21–32)
CREAT BLD-MCNC: 0.71 MG/DL
EOSINOPHIL # BLD AUTO: 0.23 X10(3) UL (ref 0–0.7)
EOSINOPHIL NFR BLD AUTO: 3.7 %
ERYTHROCYTE [DISTWIDTH] IN BLOOD BY AUTOMATED COUNT: 12.2 %
FASTING STATUS PATIENT QL REPORTED: NO
FOLATE SERPL-MCNC: >100 NG/ML (ref 8.7–?)
GLOBULIN PLAS-MCNC: 3.3 G/DL (ref 2.8–4.4)
GLUCOSE BLD-MCNC: 75 MG/DL (ref 70–99)
HCT VFR BLD AUTO: 36.3 %
HGB BLD-MCNC: 11.7 G/DL
IMM GRANULOCYTES # BLD AUTO: 0.01 X10(3) UL (ref 0–1)
IMM GRANULOCYTES NFR BLD: 0.2 %
IRON SATN MFR SERPL: 17 %
IRON SERPL-MCNC: 58 UG/DL
LYMPHOCYTES # BLD AUTO: 2.58 X10(3) UL (ref 1–4)
LYMPHOCYTES NFR BLD AUTO: 41.2 %
MCH RBC QN AUTO: 30.5 PG (ref 26–34)
MCHC RBC AUTO-ENTMCNC: 32.2 G/DL (ref 31–37)
MCV RBC AUTO: 94.8 FL
MONOCYTES # BLD AUTO: 0.44 X10(3) UL (ref 0.1–1)
MONOCYTES NFR BLD AUTO: 7 %
NEUTROPHILS # BLD AUTO: 2.95 X10 (3) UL (ref 1.5–7.7)
NEUTROPHILS # BLD AUTO: 2.95 X10(3) UL (ref 1.5–7.7)
NEUTROPHILS NFR BLD AUTO: 47.1 %
OSMOLALITY SERPL CALC.SUM OF ELEC: 289 MOSM/KG (ref 275–295)
PLATELET # BLD AUTO: 346 10(3)UL (ref 150–450)
POTASSIUM SERPL-SCNC: 3.8 MMOL/L (ref 3.5–5.1)
PROT SERPL-MCNC: 6.7 G/DL (ref 6.4–8.2)
RBC # BLD AUTO: 3.83 X10(6)UL
SODIUM SERPL-SCNC: 141 MMOL/L (ref 136–145)
T4 FREE SERPL-MCNC: 0.8 NG/DL (ref 0.8–1.7)
TIBC SERPL-MCNC: 350 UG/DL (ref 240–450)
TRANSFERRIN SERPL-MCNC: 235 MG/DL (ref 200–360)
TSI SER-ACNC: 1.28 MIU/ML (ref 0.36–3.74)
VIT B12 SERPL-MCNC: 1491 PG/ML (ref 193–986)
VIT D+METAB SERPL-MCNC: 61.6 NG/ML (ref 30–100)
WBC # BLD AUTO: 6.3 X10(3) UL (ref 4–11)

## 2022-05-24 PROCEDURE — 84425 ASSAY OF VITAMIN B-1: CPT

## 2022-05-24 PROCEDURE — 82746 ASSAY OF FOLIC ACID SERUM: CPT

## 2022-05-24 PROCEDURE — 85025 COMPLETE CBC W/AUTO DIFF WBC: CPT

## 2022-05-24 PROCEDURE — 83540 ASSAY OF IRON: CPT

## 2022-05-24 PROCEDURE — 83550 IRON BINDING TEST: CPT

## 2022-05-24 PROCEDURE — 82306 VITAMIN D 25 HYDROXY: CPT

## 2022-05-24 PROCEDURE — 82607 VITAMIN B-12: CPT

## 2022-05-24 PROCEDURE — 84443 ASSAY THYROID STIM HORMONE: CPT

## 2022-05-24 PROCEDURE — 36415 COLL VENOUS BLD VENIPUNCTURE: CPT

## 2022-05-24 PROCEDURE — 84439 ASSAY OF FREE THYROXINE: CPT

## 2022-05-24 PROCEDURE — 80053 COMPREHEN METABOLIC PANEL: CPT

## 2022-05-24 NOTE — TELEPHONE ENCOUNTER
Patient did schedule appointment    Future Appointments   Date Time Provider Ole Llanes   6/23/2022 10:20 AM KATERIN Zapata [facility]

## 2022-05-24 NOTE — TELEPHONE ENCOUNTER
HOMER Lr Amy J, RN  Hi Kina,     Pt will need a bariatrician appt in next 3 mos or so. Thank you so much!

## 2022-05-28 LAB — VITAMIN B1 (THIAMINE), WHOLE B: 143 NMOL/L

## 2022-06-23 ENCOUNTER — OFFICE VISIT (OUTPATIENT)
Dept: INTERNAL MEDICINE CLINIC | Facility: CLINIC | Age: 57
End: 2022-06-23
Payer: COMMERCIAL

## 2022-06-23 VITALS
WEIGHT: 209 LBS | SYSTOLIC BLOOD PRESSURE: 116 MMHG | BODY MASS INDEX: 34.4 KG/M2 | HEART RATE: 48 BPM | DIASTOLIC BLOOD PRESSURE: 80 MMHG | RESPIRATION RATE: 16 BRPM | HEIGHT: 65.5 IN

## 2022-06-23 DIAGNOSIS — Z51.81 ENCOUNTER FOR THERAPEUTIC DRUG MONITORING: Primary | ICD-10-CM

## 2022-06-23 DIAGNOSIS — F43.9 STRESS: ICD-10-CM

## 2022-06-23 DIAGNOSIS — E66.01 MORBID OBESITY WITH BMI OF 40.0-44.9, ADULT (HCC): ICD-10-CM

## 2022-06-23 DIAGNOSIS — R63.8 CRAVING FOR PARTICULAR FOOD: ICD-10-CM

## 2022-06-23 PROCEDURE — 3008F BODY MASS INDEX DOCD: CPT | Performed by: NURSE PRACTITIONER

## 2022-06-23 PROCEDURE — 3079F DIAST BP 80-89 MM HG: CPT | Performed by: NURSE PRACTITIONER

## 2022-06-23 PROCEDURE — 3074F SYST BP LT 130 MM HG: CPT | Performed by: NURSE PRACTITIONER

## 2022-06-23 PROCEDURE — 99214 OFFICE O/P EST MOD 30 MIN: CPT | Performed by: NURSE PRACTITIONER

## 2022-06-23 RX ORDER — NALTREXONE HYDROCHLORIDE 50 MG/1
25 TABLET, FILM COATED ORAL
Qty: 15 TABLET | Refills: 1 | Status: SHIPPED | OUTPATIENT
Start: 2022-06-23

## 2022-06-23 RX ORDER — FLUOXETINE HYDROCHLORIDE 20 MG/1
20 CAPSULE ORAL DAILY
Qty: 90 CAPSULE | Refills: 0 | Status: CANCELLED | OUTPATIENT
Start: 2022-06-23

## 2022-06-23 RX ORDER — CETIRIZINE HYDROCHLORIDE 10 MG/1
10 TABLET ORAL DAILY
COMMUNITY
Start: 2022-02-02 | End: 2022-06-23

## 2022-07-22 ENCOUNTER — IMAGING SERVICES (OUTPATIENT)
Dept: MAMMOGRAPHY | Age: 57
End: 2022-07-22
Attending: SURGERY

## 2022-07-22 DIAGNOSIS — R92.8 ABNORMAL MAMMOGRAM: ICD-10-CM

## 2022-07-22 DIAGNOSIS — R92.8 ABNORMAL ULTRASOUND OF BREAST: ICD-10-CM

## 2022-07-22 PROCEDURE — G0279 TOMOSYNTHESIS, MAMMO: HCPCS | Performed by: RADIOLOGY

## 2022-07-22 PROCEDURE — 77065 DX MAMMO INCL CAD UNI: CPT | Performed by: RADIOLOGY

## 2022-07-28 DIAGNOSIS — G43.009 MIGRAINE WITHOUT AURA AND WITHOUT STATUS MIGRAINOSUS, NOT INTRACTABLE: ICD-10-CM

## 2022-08-01 ENCOUNTER — LAB SERVICES (OUTPATIENT)
Dept: LAB | Age: 57
End: 2022-08-01

## 2022-08-01 DIAGNOSIS — G43.009 MIGRAINE WITHOUT AURA AND WITHOUT STATUS MIGRAINOSUS, NOT INTRACTABLE: ICD-10-CM

## 2022-08-01 DIAGNOSIS — E78.41 ELEVATED LIPOPROTEIN(A): ICD-10-CM

## 2022-08-01 DIAGNOSIS — R00.1 BRADYCARDIA, UNSPECIFIED: ICD-10-CM

## 2022-08-01 DIAGNOSIS — R94.39 ABNORMAL NUCLEAR STRESS TEST: ICD-10-CM

## 2022-08-01 DIAGNOSIS — I10 ESSENTIAL HYPERTENSION: ICD-10-CM

## 2022-08-01 DIAGNOSIS — E78.2 MIXED HYPERLIPIDEMIA: ICD-10-CM

## 2022-08-01 LAB
ALBUMIN SERPL-MCNC: 4.3 G/DL (ref 3.6–5.1)
ALP SERPL-CCNC: 86 U/L (ref 45–130)
ALT SERPL W/O P-5'-P-CCNC: 18 U/L (ref 4–38)
AST SERPL-CCNC: 25 U/L (ref 14–43)
BILIRUB SERPL-MCNC: 0.4 MG/DL (ref 0–1.3)
BUN SERPL-MCNC: 20 MG/DL (ref 7–20)
CALCIUM SERPL-MCNC: 9.9 MG/DL (ref 8.6–10.6)
CHLORIDE SERPL-SCNC: 103 MMOL/L (ref 96–107)
CHOLEST SERPL-MCNC: 251 MG/DL (ref 140–200)
CO2 SERPL-SCNC: 32 MMOL/L (ref 22–32)
CREAT SERPL-MCNC: 1 MG/DL (ref 0.5–1.4)
GFR SERPL CREATININE-BSD FRML MDRD: 57 ML/MIN/{1.73M2}
GFR SERPL CREATININE-BSD FRML MDRD: >60 ML/MIN/{1.73M2}
GLUCOSE P FAST SERPL-MCNC: 86 MG/DL (ref 60–100)
HDLC SERPL-MCNC: 98 MG/DL
LDLC SERPL CALC-MCNC: 142 MG/DL (ref 30–100)
MAGNESIUM SERPL-MCNC: 2.2 MG/DL (ref 1.6–2.6)
POTASSIUM SERPL-SCNC: 4.8 MMOL/L (ref 3.5–5.3)
PROT SERPL-MCNC: 7.3 G/DL (ref 6.4–8.5)
SODIUM SERPL-SCNC: 139 MMOL/L (ref 136–146)
TRIGL SERPL-MCNC: 54 MG/DL (ref 0–200)
TSH SERPL DL<=0.05 MIU/L-ACNC: 2.26 M[IU]/L (ref 0.3–4.82)

## 2022-08-01 PROCEDURE — 80061 LIPID PANEL: CPT | Performed by: INTERNAL MEDICINE

## 2022-08-01 PROCEDURE — 36415 COLL VENOUS BLD VENIPUNCTURE: CPT | Performed by: INTERNAL MEDICINE

## 2022-08-01 PROCEDURE — 83735 ASSAY OF MAGNESIUM: CPT | Performed by: INTERNAL MEDICINE

## 2022-08-01 PROCEDURE — 80053 COMPREHEN METABOLIC PANEL: CPT | Performed by: INTERNAL MEDICINE

## 2022-08-01 PROCEDURE — 84443 ASSAY THYROID STIM HORMONE: CPT | Performed by: INTERNAL MEDICINE

## 2022-08-01 RX ORDER — BUTALBITAL, ACETAMINOPHEN AND CAFFEINE 50; 325; 40 MG/1; MG/1; MG/1
1 TABLET ORAL EVERY 6 HOURS PRN
Qty: 30 TABLET | Refills: 0 | Status: SHIPPED | OUTPATIENT
Start: 2022-08-01 | End: 2022-11-01

## 2022-08-01 RX ORDER — BUTALBITAL, ACETAMINOPHEN AND CAFFEINE 50; 325; 40 MG/1; MG/1; MG/1
1 TABLET ORAL EVERY 6 HOURS PRN
Qty: 30 TABLET | Refills: 0 | Status: CANCELLED | OUTPATIENT
Start: 2022-08-01

## 2022-08-16 ENCOUNTER — OFFICE VISIT (OUTPATIENT)
Dept: CARDIOLOGY | Age: 57
End: 2022-08-16

## 2022-08-16 VITALS
WEIGHT: 203.93 LBS | HEIGHT: 66 IN | HEART RATE: 46 BPM | DIASTOLIC BLOOD PRESSURE: 70 MMHG | BODY MASS INDEX: 32.77 KG/M2 | OXYGEN SATURATION: 100 % | SYSTOLIC BLOOD PRESSURE: 114 MMHG | RESPIRATION RATE: 17 BRPM

## 2022-08-16 DIAGNOSIS — E78.41 ELEVATED LIPOPROTEIN(A): ICD-10-CM

## 2022-08-16 DIAGNOSIS — G47.33 OSA (OBSTRUCTIVE SLEEP APNEA): ICD-10-CM

## 2022-08-16 DIAGNOSIS — E78.2 MIXED HYPERLIPIDEMIA: Primary | ICD-10-CM

## 2022-08-16 DIAGNOSIS — I10 HYPERTENSION, UNSPECIFIED TYPE: ICD-10-CM

## 2022-08-16 DIAGNOSIS — R00.1 BRADYCARDIA: ICD-10-CM

## 2022-08-16 PROBLEM — M17.0 PRIMARY OSTEOARTHRITIS OF BOTH KNEES: Status: ACTIVE | Noted: 2018-01-03

## 2022-08-16 PROBLEM — Z86.010 HX OF ADENOMATOUS COLONIC POLYPS: Status: ACTIVE | Noted: 2017-07-12

## 2022-08-16 PROBLEM — M17.0 OSTEOARTHRITIS OF BOTH KNEES: Status: ACTIVE | Noted: 2019-05-21

## 2022-08-16 PROBLEM — K21.00 GASTROESOPHAGEAL REFLUX DISEASE WITH ESOPHAGITIS: Status: ACTIVE | Noted: 2017-07-12

## 2022-08-16 PROBLEM — E66.9 OBESITY: Status: ACTIVE | Noted: 2019-05-21

## 2022-08-16 PROBLEM — K22.70 BARRETT'S ESOPHAGUS WITHOUT DYSPLASIA: Status: ACTIVE | Noted: 2017-07-12

## 2022-08-16 PROBLEM — F43.9 STRESS: Status: ACTIVE | Noted: 2020-04-23

## 2022-08-16 PROBLEM — K64.9 HEMORRHOIDS: Status: ACTIVE | Noted: 2017-07-12

## 2022-08-16 PROBLEM — Z86.0101 HX OF ADENOMATOUS COLONIC POLYPS: Status: ACTIVE | Noted: 2017-07-12

## 2022-08-16 PROBLEM — Z51.81 ENCOUNTER FOR THERAPEUTIC DRUG MONITORING: Status: ACTIVE | Noted: 2019-05-21

## 2022-08-16 PROBLEM — G43.909 MIGRAINE WITHOUT STATUS MIGRAINOSUS, NOT INTRACTABLE: Status: ACTIVE | Noted: 2019-05-21

## 2022-08-16 PROBLEM — K44.9 HIATAL HERNIA: Status: ACTIVE | Noted: 2017-07-12

## 2022-08-16 PROCEDURE — 3078F DIAST BP <80 MM HG: CPT | Performed by: INTERNAL MEDICINE

## 2022-08-16 PROCEDURE — 99214 OFFICE O/P EST MOD 30 MIN: CPT | Performed by: INTERNAL MEDICINE

## 2022-08-16 PROCEDURE — 3074F SYST BP LT 130 MM HG: CPT | Performed by: INTERNAL MEDICINE

## 2022-08-16 RX ORDER — ROSUVASTATIN CALCIUM 5 MG/1
5 TABLET, COATED ORAL NIGHTLY
Qty: 30 TABLET | Refills: 5 | Status: SHIPPED | OUTPATIENT
Start: 2022-08-16 | End: 2023-02-14

## 2022-08-23 DIAGNOSIS — I10 ESSENTIAL HYPERTENSION: ICD-10-CM

## 2022-08-24 DIAGNOSIS — K22.719 BARRETT'S ESOPHAGUS WITH DYSPLASIA: ICD-10-CM

## 2022-08-24 RX ORDER — LISINOPRIL 20 MG/1
TABLET ORAL
Qty: 30 TABLET | Refills: 0 | Status: SHIPPED | OUTPATIENT
Start: 2022-08-24 | End: 2022-11-26 | Stop reason: SDUPTHER

## 2022-08-24 RX ORDER — PANTOPRAZOLE SODIUM 40 MG/1
40 TABLET, DELAYED RELEASE ORAL DAILY
Qty: 30 TABLET | Refills: 0 | Status: SHIPPED | OUTPATIENT
Start: 2022-08-24 | End: 2022-09-24 | Stop reason: SDUPTHER

## 2022-08-26 ENCOUNTER — TELEPHONE (OUTPATIENT)
Dept: CARDIOLOGY | Age: 57
End: 2022-08-26

## 2022-08-26 DIAGNOSIS — R06.83 SNORING: ICD-10-CM

## 2022-08-26 DIAGNOSIS — G47.33 OSA (OBSTRUCTIVE SLEEP APNEA): Primary | ICD-10-CM

## 2022-08-31 ENCOUNTER — IMAGING SERVICES (OUTPATIENT)
Dept: GENERAL RADIOLOGY | Age: 57
End: 2022-08-31
Attending: FAMILY MEDICINE

## 2022-08-31 ENCOUNTER — WALK IN (OUTPATIENT)
Dept: URGENT CARE | Age: 57
End: 2022-08-31

## 2022-08-31 ENCOUNTER — OFFICE VISIT (OUTPATIENT)
Dept: SPORTS MEDICINE | Age: 57
End: 2022-08-31

## 2022-08-31 VITALS
SYSTOLIC BLOOD PRESSURE: 123 MMHG | TEMPERATURE: 98 F | BODY MASS INDEX: 33.09 KG/M2 | HEART RATE: 56 BPM | RESPIRATION RATE: 16 BRPM | OXYGEN SATURATION: 100 % | DIASTOLIC BLOOD PRESSURE: 77 MMHG | WEIGHT: 205 LBS

## 2022-08-31 VITALS
HEIGHT: 66 IN | DIASTOLIC BLOOD PRESSURE: 69 MMHG | HEART RATE: 53 BPM | BODY MASS INDEX: 32.95 KG/M2 | WEIGHT: 205 LBS | SYSTOLIC BLOOD PRESSURE: 117 MMHG

## 2022-08-31 DIAGNOSIS — M79.672 PAIN OF LEFT HEEL: ICD-10-CM

## 2022-08-31 DIAGNOSIS — M79.672 LEFT FOOT PAIN: ICD-10-CM

## 2022-08-31 DIAGNOSIS — M77.32 CALCANEAL SPUR OF LEFT FOOT: ICD-10-CM

## 2022-08-31 DIAGNOSIS — G57.92 NEURITIS OF LEFT FOOT: ICD-10-CM

## 2022-08-31 DIAGNOSIS — M79.672 LEFT FOOT PAIN: Primary | ICD-10-CM

## 2022-08-31 PROCEDURE — 73650 X-RAY EXAM OF HEEL: CPT | Performed by: RADIOLOGY

## 2022-08-31 PROCEDURE — 99214 OFFICE O/P EST MOD 30 MIN: CPT | Performed by: FAMILY MEDICINE

## 2022-08-31 PROCEDURE — 73630 X-RAY EXAM OF FOOT: CPT | Performed by: RADIOLOGY

## 2022-08-31 PROCEDURE — 96372 THER/PROPH/DIAG INJ SC/IM: CPT | Performed by: FAMILY MEDICINE

## 2022-08-31 PROCEDURE — 99204 OFFICE O/P NEW MOD 45 MIN: CPT | Performed by: FAMILY MEDICINE

## 2022-08-31 RX ORDER — FEXOFENADINE HCL 180 MG/1
180 TABLET ORAL DAILY
COMMUNITY
End: 2023-02-07 | Stop reason: ALTCHOICE

## 2022-08-31 RX ORDER — KETOROLAC TROMETHAMINE 30 MG/ML
60 INJECTION, SOLUTION INTRAMUSCULAR; INTRAVENOUS ONCE
Status: COMPLETED | OUTPATIENT
Start: 2022-08-31 | End: 2022-08-31

## 2022-08-31 RX ORDER — PREDNISONE 20 MG/1
TABLET ORAL
Qty: 10 TABLET | Refills: 0 | Status: SHIPPED | OUTPATIENT
Start: 2022-08-31 | End: 2022-09-07 | Stop reason: ALTCHOICE

## 2022-08-31 RX ORDER — NALTREXONE HYDROCHLORIDE 50 MG/1
TABLET, FILM COATED ORAL
COMMUNITY
Start: 2022-07-27

## 2022-08-31 RX ORDER — HYDROCODONE BITARTRATE AND ACETAMINOPHEN 5; 325 MG/1; MG/1
1 TABLET ORAL EVERY 6 HOURS PRN
Qty: 20 TABLET | Refills: 0 | Status: SHIPPED | OUTPATIENT
Start: 2022-08-31 | End: 2022-08-31 | Stop reason: SINTOL

## 2022-08-31 RX ADMIN — KETOROLAC TROMETHAMINE 60 MG: 30 INJECTION, SOLUTION INTRAMUSCULAR; INTRAVENOUS at 11:30

## 2022-08-31 ASSESSMENT — ENCOUNTER SYMPTOMS
FEVER: 0
SEIZURES: 0
FATIGUE: 0
TROUBLE SWALLOWING: 0
PHOTOPHOBIA: 0
SINUS PAIN: 0
SHORTNESS OF BREATH: 0
CHILLS: 0
ABDOMINAL PAIN: 0
CONSTIPATION: 0
DIARRHEA: 0
AGITATION: 0
HEADACHES: 0
CHEST TIGHTNESS: 0
NUMBNESS: 0
DIZZINESS: 0
APNEA: 1
EYE PAIN: 0
EYE REDNESS: 0
SINUS PRESSURE: 0
WHEEZING: 0
ACTIVITY CHANGE: 0
NAUSEA: 0
NERVOUS/ANXIOUS: 0
SLEEP DISTURBANCE: 0
BACK PAIN: 0
SORE THROAT: 0

## 2022-08-31 ASSESSMENT — PAIN SCALES - GENERAL: PAINLEVEL: 8

## 2022-09-07 ENCOUNTER — OFFICE VISIT (OUTPATIENT)
Dept: INTERNAL MEDICINE | Age: 57
End: 2022-09-07

## 2022-09-07 VITALS
HEIGHT: 66 IN | BODY MASS INDEX: 33.75 KG/M2 | DIASTOLIC BLOOD PRESSURE: 77 MMHG | WEIGHT: 210 LBS | RESPIRATION RATE: 16 BRPM | SYSTOLIC BLOOD PRESSURE: 110 MMHG | TEMPERATURE: 97.5 F | HEART RATE: 48 BPM

## 2022-09-07 DIAGNOSIS — Z51.81 ENCOUNTER FOR THERAPEUTIC DRUG MONITORING: ICD-10-CM

## 2022-09-07 DIAGNOSIS — R63.8 CRAVING FOR PARTICULAR FOOD: ICD-10-CM

## 2022-09-07 DIAGNOSIS — R00.1 BRADYCARDIA: Primary | ICD-10-CM

## 2022-09-07 DIAGNOSIS — E66.01 MORBID OBESITY WITH BMI OF 40.0-44.9, ADULT (HCC): ICD-10-CM

## 2022-09-07 DIAGNOSIS — I10 ESSENTIAL HYPERTENSION: ICD-10-CM

## 2022-09-07 PROCEDURE — 99213 OFFICE O/P EST LOW 20 MIN: CPT | Performed by: INTERNAL MEDICINE

## 2022-09-07 PROCEDURE — 3074F SYST BP LT 130 MM HG: CPT | Performed by: INTERNAL MEDICINE

## 2022-09-07 PROCEDURE — 3078F DIAST BP <80 MM HG: CPT | Performed by: INTERNAL MEDICINE

## 2022-09-07 RX ORDER — NALTREXONE HYDROCHLORIDE 50 MG/1
25 TABLET, FILM COATED ORAL
Qty: 15 TABLET | Refills: 1 | Status: CANCELLED | OUTPATIENT
Start: 2022-09-07

## 2022-09-07 ASSESSMENT — PATIENT HEALTH QUESTIONNAIRE - PHQ9
SUM OF ALL RESPONSES TO PHQ9 QUESTIONS 1 AND 2: 0
2. FEELING DOWN, DEPRESSED OR HOPELESS: NOT AT ALL
CLINICAL INTERPRETATION OF PHQ2 SCORE: NO FURTHER SCREENING NEEDED
SUM OF ALL RESPONSES TO PHQ9 QUESTIONS 1 AND 2: 0
1. LITTLE INTEREST OR PLEASURE IN DOING THINGS: NOT AT ALL

## 2022-09-07 NOTE — TELEPHONE ENCOUNTER
Requesting naltrexone 50 mg  LOV: 6/23/22  RTC: 6 weeks  Last Relevant Labs: na  Filled: 6/23/22 #15 with 1 refills    Future Appointments   Date Time Provider Ole Llanes   9/29/2022  8:40 AM Cornel JhaveriutantKATERIN EM[facility] [address]

## 2022-09-08 RX ORDER — NALTREXONE HYDROCHLORIDE 50 MG/1
25 TABLET, FILM COATED ORAL
Qty: 15 TABLET | Refills: 0 | Status: SHIPPED | OUTPATIENT
Start: 2022-09-08 | End: 2022-09-29

## 2022-09-16 ENCOUNTER — OFFICE VISIT (OUTPATIENT)
Dept: SURGERY | Age: 57
End: 2022-09-16

## 2022-09-16 VITALS — HEIGHT: 66 IN | BODY MASS INDEX: 33.43 KG/M2 | WEIGHT: 208 LBS

## 2022-09-16 DIAGNOSIS — R92.8 ABNORMAL MAMMOGRAM: ICD-10-CM

## 2022-09-16 DIAGNOSIS — D48.61 NEOPLASM OF UNCERTAIN BEHAVIOR OF RIGHT BREAST: ICD-10-CM

## 2022-09-16 DIAGNOSIS — D48.62 NEOPLASM OF UNCERTAIN BEHAVIOR OF LEFT BREAST: ICD-10-CM

## 2022-09-16 DIAGNOSIS — R92.0 MAMMOGRAPHIC MICROCALCIFICATION: Primary | ICD-10-CM

## 2022-09-16 DIAGNOSIS — R92.8 ABNORMAL ULTRASOUND OF BREAST: ICD-10-CM

## 2022-09-16 PROCEDURE — 99214 OFFICE O/P EST MOD 30 MIN: CPT | Performed by: SURGERY

## 2022-09-24 DIAGNOSIS — K22.719 BARRETT'S ESOPHAGUS WITH DYSPLASIA: ICD-10-CM

## 2022-09-26 RX ORDER — PANTOPRAZOLE SODIUM 40 MG/1
40 TABLET, DELAYED RELEASE ORAL DAILY
Qty: 30 TABLET | Refills: 0 | Status: SHIPPED | OUTPATIENT
Start: 2022-09-26 | End: 2022-10-27

## 2022-09-27 ENCOUNTER — LAB SERVICES (OUTPATIENT)
Dept: LAB | Age: 57
End: 2022-09-27

## 2022-09-27 DIAGNOSIS — E78.41 ELEVATED LIPOPROTEIN(A): ICD-10-CM

## 2022-09-27 DIAGNOSIS — E78.2 MIXED HYPERLIPIDEMIA: ICD-10-CM

## 2022-09-27 DIAGNOSIS — I10 HYPERTENSION, UNSPECIFIED TYPE: ICD-10-CM

## 2022-09-27 DIAGNOSIS — R00.1 BRADYCARDIA: ICD-10-CM

## 2022-09-27 DIAGNOSIS — G47.33 OSA (OBSTRUCTIVE SLEEP APNEA): ICD-10-CM

## 2022-09-27 LAB
ALBUMIN SERPL-MCNC: 4 G/DL (ref 3.6–5.1)
ALP SERPL-CCNC: 83 U/L (ref 45–130)
ALT SERPL W/O P-5'-P-CCNC: 18 U/L (ref 4–38)
AST SERPL-CCNC: 23 U/L (ref 14–43)
BILIRUB SERPL-MCNC: 0.4 MG/DL (ref 0–1.3)
BUN SERPL-MCNC: 15 MG/DL (ref 7–20)
CALCIUM SERPL-MCNC: 9.5 MG/DL (ref 8.6–10.6)
CHLORIDE SERPL-SCNC: 108 MMOL/L (ref 96–107)
CHOLEST SERPL-MCNC: 196 MG/DL (ref 140–200)
CK SERPL-CCNC: 95 U/L (ref 30–135)
CO2 SERPL-SCNC: 28 MMOL/L (ref 22–32)
CREAT SERPL-MCNC: 1 MG/DL (ref 0.5–1.4)
GFR SERPL CREATININE-BSD FRML MDRD: 57 ML/MIN/{1.73M2}
GFR SERPL CREATININE-BSD FRML MDRD: >60 ML/MIN/{1.73M2}
GLUCOSE P FAST SERPL-MCNC: 87 MG/DL (ref 60–100)
HDLC SERPL-MCNC: 98 MG/DL
LDLC SERPL CALC-MCNC: 90 MG/DL (ref 30–100)
MAGNESIUM SERPL-MCNC: 2 MG/DL (ref 1.6–2.6)
POTASSIUM SERPL-SCNC: 4.4 MMOL/L (ref 3.5–5.3)
PROT SERPL-MCNC: 6.9 G/DL (ref 6.4–8.5)
SODIUM SERPL-SCNC: 140 MMOL/L (ref 136–146)
TRIGL SERPL-MCNC: 42 MG/DL (ref 0–200)

## 2022-09-27 PROCEDURE — 36415 COLL VENOUS BLD VENIPUNCTURE: CPT | Performed by: INTERNAL MEDICINE

## 2022-09-27 PROCEDURE — 80053 COMPREHEN METABOLIC PANEL: CPT | Performed by: INTERNAL MEDICINE

## 2022-09-27 PROCEDURE — 83735 ASSAY OF MAGNESIUM: CPT | Performed by: INTERNAL MEDICINE

## 2022-09-27 PROCEDURE — 82550 ASSAY OF CK (CPK): CPT | Performed by: INTERNAL MEDICINE

## 2022-09-27 PROCEDURE — 80061 LIPID PANEL: CPT | Performed by: INTERNAL MEDICINE

## 2022-09-29 ENCOUNTER — OFFICE VISIT (OUTPATIENT)
Dept: INTERNAL MEDICINE CLINIC | Facility: CLINIC | Age: 57
End: 2022-09-29

## 2022-09-29 VITALS
HEART RATE: 74 BPM | HEIGHT: 65.5 IN | BODY MASS INDEX: 34.9 KG/M2 | SYSTOLIC BLOOD PRESSURE: 100 MMHG | DIASTOLIC BLOOD PRESSURE: 60 MMHG | WEIGHT: 212 LBS

## 2022-09-29 DIAGNOSIS — F43.9 STRESS: ICD-10-CM

## 2022-09-29 DIAGNOSIS — R63.8 CRAVING FOR PARTICULAR FOOD: ICD-10-CM

## 2022-09-29 DIAGNOSIS — I10 ESSENTIAL HYPERTENSION: ICD-10-CM

## 2022-09-29 DIAGNOSIS — E66.01 MORBID OBESITY WITH BMI OF 40.0-44.9, ADULT (HCC): ICD-10-CM

## 2022-09-29 DIAGNOSIS — Z51.81 ENCOUNTER FOR THERAPEUTIC DRUG MONITORING: Primary | ICD-10-CM

## 2022-09-29 DIAGNOSIS — E78.00 HYPERCHOLESTEROLEMIA: ICD-10-CM

## 2022-09-29 DIAGNOSIS — Z90.3 HISTORY OF SLEEVE GASTRECTOMY: ICD-10-CM

## 2022-09-29 DIAGNOSIS — G47.9 SLEEP DISTURBANCE: ICD-10-CM

## 2022-09-29 PROCEDURE — 3074F SYST BP LT 130 MM HG: CPT | Performed by: NURSE PRACTITIONER

## 2022-09-29 PROCEDURE — 3008F BODY MASS INDEX DOCD: CPT | Performed by: NURSE PRACTITIONER

## 2022-09-29 PROCEDURE — 3078F DIAST BP <80 MM HG: CPT | Performed by: NURSE PRACTITIONER

## 2022-09-29 PROCEDURE — 99214 OFFICE O/P EST MOD 30 MIN: CPT | Performed by: NURSE PRACTITIONER

## 2022-09-29 RX ORDER — NALTREXONE HYDROCHLORIDE 50 MG/1
50 TABLET, FILM COATED ORAL
Qty: 30 TABLET | Refills: 2 | Status: SHIPPED | OUTPATIENT
Start: 2022-09-29

## 2022-09-29 RX ORDER — ROSUVASTATIN CALCIUM 5 MG/1
5 TABLET, COATED ORAL NIGHTLY
COMMUNITY
Start: 2022-09-12

## 2022-09-29 RX ORDER — FLUOXETINE HYDROCHLORIDE 20 MG/1
20 CAPSULE ORAL DAILY
Qty: 90 CAPSULE | Refills: 1 | Status: SHIPPED | OUTPATIENT
Start: 2022-09-29

## 2022-10-26 DIAGNOSIS — K22.719 BARRETT'S ESOPHAGUS WITH DYSPLASIA: ICD-10-CM

## 2022-10-27 RX ORDER — PANTOPRAZOLE SODIUM 40 MG/1
TABLET, DELAYED RELEASE ORAL
Qty: 90 TABLET | Refills: 1 | Status: SHIPPED | OUTPATIENT
Start: 2022-10-27 | End: 2023-09-08

## 2022-10-31 DIAGNOSIS — G43.009 MIGRAINE WITHOUT AURA AND WITHOUT STATUS MIGRAINOSUS, NOT INTRACTABLE: ICD-10-CM

## 2022-11-01 RX ORDER — BUTALBITAL, ACETAMINOPHEN AND CAFFEINE 50; 325; 40 MG/1; MG/1; MG/1
1 TABLET ORAL EVERY 6 HOURS PRN
Qty: 30 TABLET | Refills: 0 | Status: SHIPPED | OUTPATIENT
Start: 2022-11-01 | End: 2023-01-17

## 2022-11-26 DIAGNOSIS — I10 ESSENTIAL HYPERTENSION: ICD-10-CM

## 2022-11-26 RX ORDER — LISINOPRIL 20 MG/1
20 TABLET ORAL DAILY
Qty: 90 TABLET | Refills: 0 | Status: SHIPPED | OUTPATIENT
Start: 2022-11-26 | End: 2022-12-14 | Stop reason: SDUPTHER

## 2022-11-28 DIAGNOSIS — I10 ESSENTIAL HYPERTENSION: ICD-10-CM

## 2022-11-28 RX ORDER — LISINOPRIL 20 MG/1
20 TABLET ORAL DAILY
Qty: 90 TABLET | Refills: 0 | OUTPATIENT
Start: 2022-11-28

## 2022-11-30 ENCOUNTER — OFFICE VISIT (OUTPATIENT)
Dept: INTERNAL MEDICINE CLINIC | Facility: CLINIC | Age: 57
End: 2022-11-30
Payer: COMMERCIAL

## 2022-11-30 VITALS
BODY MASS INDEX: 34.99 KG/M2 | HEIGHT: 65 IN | HEART RATE: 54 BPM | DIASTOLIC BLOOD PRESSURE: 72 MMHG | SYSTOLIC BLOOD PRESSURE: 118 MMHG | WEIGHT: 210 LBS

## 2022-11-30 DIAGNOSIS — E78.00 HYPERCHOLESTEROLEMIA: ICD-10-CM

## 2022-11-30 DIAGNOSIS — Z86.39 HISTORY OF MORBID OBESITY: ICD-10-CM

## 2022-11-30 DIAGNOSIS — I10 ESSENTIAL HYPERTENSION: ICD-10-CM

## 2022-11-30 DIAGNOSIS — E66.01 CLASS 2 SEVERE OBESITY WITH SERIOUS COMORBIDITY AND BODY MASS INDEX (BMI) OF 35.0 TO 35.9 IN ADULT, UNSPECIFIED OBESITY TYPE (HCC): ICD-10-CM

## 2022-11-30 DIAGNOSIS — Z51.81 ENCOUNTER FOR THERAPEUTIC DRUG MONITORING: Primary | ICD-10-CM

## 2022-11-30 DIAGNOSIS — Z90.3 HISTORY OF SLEEVE GASTRECTOMY: ICD-10-CM

## 2022-11-30 PROCEDURE — 3078F DIAST BP <80 MM HG: CPT | Performed by: NURSE PRACTITIONER

## 2022-11-30 PROCEDURE — 3074F SYST BP LT 130 MM HG: CPT | Performed by: NURSE PRACTITIONER

## 2022-11-30 PROCEDURE — 3008F BODY MASS INDEX DOCD: CPT | Performed by: NURSE PRACTITIONER

## 2022-11-30 PROCEDURE — 99213 OFFICE O/P EST LOW 20 MIN: CPT | Performed by: NURSE PRACTITIONER

## 2022-11-30 RX ORDER — PHENTERMINE AND TOPIRAMATE 3.75; 23 MG/1; MG/1
1 CAPSULE, EXTENDED RELEASE ORAL EVERY MORNING
Qty: 30 CAPSULE | Refills: 0 | Status: SHIPPED | OUTPATIENT
Start: 2022-11-30

## 2022-12-05 ENCOUNTER — OFFICE VISIT (OUTPATIENT)
Dept: SLEEP CENTER | Age: 57
End: 2022-12-05
Attending: NURSE PRACTITIONER
Payer: COMMERCIAL

## 2022-12-05 DIAGNOSIS — I10 ESSENTIAL HYPERTENSION: ICD-10-CM

## 2022-12-05 DIAGNOSIS — E78.00 HYPERCHOLESTEROLEMIA: ICD-10-CM

## 2022-12-05 DIAGNOSIS — G47.9 SLEEP DISTURBANCE: ICD-10-CM

## 2022-12-05 DIAGNOSIS — E66.01 MORBID OBESITY WITH BMI OF 40.0-44.9, ADULT (HCC): ICD-10-CM

## 2022-12-05 PROCEDURE — 95806 SLEEP STUDY UNATT&RESP EFFT: CPT

## 2022-12-13 ENCOUNTER — OFFICE VISIT (OUTPATIENT)
Dept: SURGERY | Facility: CLINIC | Age: 57
End: 2022-12-13
Payer: COMMERCIAL

## 2022-12-13 VITALS
OXYGEN SATURATION: 100 % | BODY MASS INDEX: 33.5 KG/M2 | WEIGHT: 206 LBS | DIASTOLIC BLOOD PRESSURE: 80 MMHG | HEIGHT: 65.8 IN | SYSTOLIC BLOOD PRESSURE: 102 MMHG | HEART RATE: 65 BPM

## 2022-12-14 ENCOUNTER — TELEPHONE (OUTPATIENT)
Dept: INTERNAL MEDICINE | Age: 57
End: 2022-12-14

## 2022-12-14 RX ORDER — LISINOPRIL 20 MG/1
20 TABLET ORAL DAILY
Qty: 90 TABLET | Refills: 0 | Status: SHIPPED | OUTPATIENT
Start: 2022-12-14 | End: 2023-09-08

## 2022-12-25 ENCOUNTER — PATIENT MESSAGE (OUTPATIENT)
Dept: INTERNAL MEDICINE CLINIC | Facility: CLINIC | Age: 57
End: 2022-12-25

## 2022-12-27 NOTE — TELEPHONE ENCOUNTER
Requesting qysmia  3.75-23mg  LOV: 11/30/22  RTC: 2 month   Last Relevant Labs:   Filled: 30 tab om 11/30/22  # with 0 refills    No future appointments. Wanted to increase the dose as discussed during OV.please advise.

## 2022-12-27 NOTE — TELEPHONE ENCOUNTER
From: Hola Stewart  To: KATERIN Rodriguez  Sent: 12/25/2022 9:52 PM CST  Subject: Increase of medication for Qsymia    Doing well with Qsymia 3.75mg can you increase dosage? Send to [facility] at [address] [facility], [address].  Thanks   [employer]

## 2022-12-28 NOTE — TELEPHONE ENCOUNTER
Yes, can increase Qsymia to next dose of 7.5/46 mg, si tab qAM #30 with 1 refill. Please make sure she schedules a f/u in the next 2 months as well.  Thanks

## 2022-12-30 RX ORDER — PHENTERMINE AND TOPIRAMATE 7.5; 46 MG/1; MG/1
1 CAPSULE, EXTENDED RELEASE ORAL EVERY MORNING
Qty: 30 CAPSULE | Refills: 1 | Status: SHIPPED | OUTPATIENT
Start: 2022-12-30

## 2023-01-16 DIAGNOSIS — R63.8 CRAVING FOR PARTICULAR FOOD: ICD-10-CM

## 2023-01-16 DIAGNOSIS — Z51.81 ENCOUNTER FOR THERAPEUTIC DRUG MONITORING: ICD-10-CM

## 2023-01-16 DIAGNOSIS — G43.009 MIGRAINE WITHOUT AURA AND WITHOUT STATUS MIGRAINOSUS, NOT INTRACTABLE: ICD-10-CM

## 2023-01-16 DIAGNOSIS — E66.01 MORBID OBESITY WITH BMI OF 40.0-44.9, ADULT (HCC): ICD-10-CM

## 2023-01-17 RX ORDER — BUTALBITAL, ACETAMINOPHEN AND CAFFEINE 50; 325; 40 MG/1; MG/1; MG/1
1 TABLET ORAL EVERY 6 HOURS PRN
Qty: 30 TABLET | Refills: 0 | Status: SHIPPED | OUTPATIENT
Start: 2023-01-17 | End: 2023-05-02

## 2023-01-17 RX ORDER — NALTREXONE HYDROCHLORIDE 50 MG/1
TABLET, FILM COATED ORAL
Qty: 30 TABLET | Refills: 2 | Status: SHIPPED | OUTPATIENT
Start: 2023-01-17

## 2023-01-25 ENCOUNTER — IMAGING SERVICES (OUTPATIENT)
Dept: MAMMOGRAPHY | Age: 58
End: 2023-01-25
Attending: SURGERY

## 2023-01-25 DIAGNOSIS — R92.8 ABNORMAL MAMMOGRAM: ICD-10-CM

## 2023-01-25 DIAGNOSIS — R92.0 MAMMOGRAPHIC MICROCALCIFICATION: ICD-10-CM

## 2023-01-25 DIAGNOSIS — R92.8 ABNORMAL ULTRASOUND OF BREAST: ICD-10-CM

## 2023-01-25 DIAGNOSIS — D48.62 NEOPLASM OF UNCERTAIN BEHAVIOR OF LEFT BREAST: ICD-10-CM

## 2023-01-25 PROCEDURE — 77066 DX MAMMO INCL CAD BI: CPT | Performed by: RADIOLOGY

## 2023-01-29 DIAGNOSIS — I10 ESSENTIAL HYPERTENSION: ICD-10-CM

## 2023-01-31 RX ORDER — LISINOPRIL 20 MG/1
20 TABLET ORAL DAILY
Qty: 90 TABLET | Refills: 3 | OUTPATIENT
Start: 2023-01-31

## 2023-02-07 ENCOUNTER — OFFICE VISIT (OUTPATIENT)
Dept: INTERNAL MEDICINE | Age: 58
End: 2023-02-07

## 2023-02-07 VITALS
TEMPERATURE: 97.3 F | WEIGHT: 207 LBS | BODY MASS INDEX: 33.27 KG/M2 | HEIGHT: 66 IN | HEART RATE: 57 BPM | DIASTOLIC BLOOD PRESSURE: 70 MMHG | RESPIRATION RATE: 16 BRPM | SYSTOLIC BLOOD PRESSURE: 106 MMHG | OXYGEN SATURATION: 98 %

## 2023-02-07 DIAGNOSIS — I10 PRIMARY HYPERTENSION: ICD-10-CM

## 2023-02-07 DIAGNOSIS — J45.20 MILD INTERMITTENT ASTHMA WITHOUT COMPLICATION: ICD-10-CM

## 2023-02-07 DIAGNOSIS — Z00.00 ANNUAL PHYSICAL EXAM: Primary | ICD-10-CM

## 2023-02-07 PROCEDURE — 3078F DIAST BP <80 MM HG: CPT | Performed by: INTERNAL MEDICINE

## 2023-02-07 PROCEDURE — 3074F SYST BP LT 130 MM HG: CPT | Performed by: INTERNAL MEDICINE

## 2023-02-07 PROCEDURE — 99396 PREV VISIT EST AGE 40-64: CPT | Performed by: INTERNAL MEDICINE

## 2023-02-07 RX ORDER — PHENTERMINE AND TOPIRAMATE 7.5; 46 MG/1; MG/1
1 CAPSULE, EXTENDED RELEASE ORAL EVERY MORNING
COMMUNITY
Start: 2023-01-30

## 2023-02-07 RX ORDER — FLUOXETINE HYDROCHLORIDE 20 MG/1
20 CAPSULE ORAL
COMMUNITY
Start: 2022-09-29

## 2023-02-07 SDOH — HEALTH STABILITY: PHYSICAL HEALTH: ON AVERAGE, HOW MANY MINUTES DO YOU ENGAGE IN EXERCISE AT THIS LEVEL?: 40 MIN

## 2023-02-07 SDOH — HEALTH STABILITY: PHYSICAL HEALTH: ON AVERAGE, HOW MANY DAYS PER WEEK DO YOU ENGAGE IN MODERATE TO STRENUOUS EXERCISE (LIKE A BRISK WALK)?: 4 DAYS

## 2023-02-07 ASSESSMENT — PATIENT HEALTH QUESTIONNAIRE - PHQ9
SUM OF ALL RESPONSES TO PHQ9 QUESTIONS 1 AND 2: 0
1. LITTLE INTEREST OR PLEASURE IN DOING THINGS: NOT AT ALL
SUM OF ALL RESPONSES TO PHQ9 QUESTIONS 1 AND 2: 0
CLINICAL INTERPRETATION OF PHQ2 SCORE: NO FURTHER SCREENING NEEDED
2. FEELING DOWN, DEPRESSED OR HOPELESS: NOT AT ALL

## 2023-02-14 RX ORDER — ROSUVASTATIN CALCIUM 5 MG/1
5 TABLET, COATED ORAL NIGHTLY
Qty: 90 TABLET | Refills: 1 | Status: SHIPPED | OUTPATIENT
Start: 2023-02-14 | End: 2023-02-27 | Stop reason: SDUPTHER

## 2023-02-20 ENCOUNTER — LAB SERVICES (OUTPATIENT)
Dept: LAB | Age: 58
End: 2023-02-20

## 2023-02-20 DIAGNOSIS — E78.41 ELEVATED LIPOPROTEIN(A): ICD-10-CM

## 2023-02-20 DIAGNOSIS — I10 HYPERTENSION, UNSPECIFIED TYPE: ICD-10-CM

## 2023-02-20 DIAGNOSIS — G47.33 OSA (OBSTRUCTIVE SLEEP APNEA): ICD-10-CM

## 2023-02-20 DIAGNOSIS — E78.2 MIXED HYPERLIPIDEMIA: ICD-10-CM

## 2023-02-20 DIAGNOSIS — R00.1 BRADYCARDIA: ICD-10-CM

## 2023-02-20 LAB
ALBUMIN SERPL-MCNC: 3.6 G/DL (ref 3.6–5.1)
ALBUMIN/GLOB SERPL: 1.2 {RATIO} (ref 1–2.4)
ALP SERPL-CCNC: 80 UNITS/L (ref 45–117)
ALT SERPL-CCNC: 27 UNITS/L
ANION GAP SERPL CALC-SCNC: 14 MMOL/L (ref 7–19)
AST SERPL-CCNC: 22 UNITS/L
BILIRUB SERPL-MCNC: 0.3 MG/DL (ref 0.2–1)
BUN SERPL-MCNC: 17 MG/DL (ref 6–20)
BUN/CREAT SERPL: 17 (ref 7–25)
CALCIUM SERPL-MCNC: 8.7 MG/DL (ref 8.4–10.2)
CHLORIDE SERPL-SCNC: 106 MMOL/L (ref 97–110)
CHOLEST SERPL-MCNC: 197 MG/DL
CHOLEST/HDLC SERPL: 2 {RATIO}
CO2 SERPL-SCNC: 26 MMOL/L (ref 21–32)
CREAT SERPL-MCNC: 1.02 MG/DL (ref 0.51–0.95)
FASTING DURATION TIME PATIENT: 8 HOURS (ref 0–999)
FASTING DURATION TIME PATIENT: 8 HOURS (ref 0–999)
GFR SERPLBLD BASED ON 1.73 SQ M-ARVRAT: 65 ML/MIN
GLOBULIN SER-MCNC: 3 G/DL (ref 2–4)
GLUCOSE SERPL-MCNC: 86 MG/DL (ref 70–99)
HDLC SERPL-MCNC: 98 MG/DL
LDLC SERPL CALC-MCNC: 91 MG/DL
NONHDLC SERPL-MCNC: 99 MG/DL
POTASSIUM SERPL-SCNC: 4.3 MMOL/L (ref 3.4–5.1)
PROT SERPL-MCNC: 6.6 G/DL (ref 6.4–8.2)
SODIUM SERPL-SCNC: 142 MMOL/L (ref 135–145)
TRIGL SERPL-MCNC: 38 MG/DL

## 2023-02-20 PROCEDURE — 80061 LIPID PANEL: CPT | Performed by: INTERNAL MEDICINE

## 2023-02-20 PROCEDURE — 36415 COLL VENOUS BLD VENIPUNCTURE: CPT | Performed by: INTERNAL MEDICINE

## 2023-02-20 PROCEDURE — 80053 COMPREHEN METABOLIC PANEL: CPT | Performed by: INTERNAL MEDICINE

## 2023-02-27 ENCOUNTER — OFFICE VISIT (OUTPATIENT)
Dept: CARDIOLOGY | Age: 58
End: 2023-02-27

## 2023-02-27 VITALS
SYSTOLIC BLOOD PRESSURE: 104 MMHG | HEART RATE: 62 BPM | RESPIRATION RATE: 16 BRPM | WEIGHT: 203.15 LBS | OXYGEN SATURATION: 98 % | HEIGHT: 66 IN | BODY MASS INDEX: 32.65 KG/M2 | DIASTOLIC BLOOD PRESSURE: 72 MMHG

## 2023-02-27 DIAGNOSIS — R73.03 BORDERLINE DIABETES MELLITUS: ICD-10-CM

## 2023-02-27 DIAGNOSIS — E78.5 HYPERLIPIDEMIA, UNSPECIFIED HYPERLIPIDEMIA TYPE: Primary | ICD-10-CM

## 2023-02-27 DIAGNOSIS — R00.1 BRADYCARDIA: ICD-10-CM

## 2023-02-27 DIAGNOSIS — G47.33 SLEEP APNEA, OBSTRUCTIVE: ICD-10-CM

## 2023-02-27 DIAGNOSIS — E55.9 VITAMIN D DEFICIENCY: ICD-10-CM

## 2023-02-27 PROCEDURE — 93000 ELECTROCARDIOGRAM COMPLETE: CPT | Performed by: INTERNAL MEDICINE

## 2023-02-27 PROCEDURE — 99214 OFFICE O/P EST MOD 30 MIN: CPT | Performed by: INTERNAL MEDICINE

## 2023-02-27 RX ORDER — ROSUVASTATIN CALCIUM 10 MG/1
10 TABLET, COATED ORAL NIGHTLY
Qty: 90 TABLET | Refills: 3 | Status: SHIPPED | OUTPATIENT
Start: 2023-02-27

## 2023-03-01 ENCOUNTER — OFFICE VISIT (OUTPATIENT)
Dept: INTERNAL MEDICINE CLINIC | Facility: CLINIC | Age: 58
End: 2023-03-01
Payer: COMMERCIAL

## 2023-03-01 VITALS
BODY MASS INDEX: 33.66 KG/M2 | DIASTOLIC BLOOD PRESSURE: 74 MMHG | WEIGHT: 202 LBS | OXYGEN SATURATION: 98 % | RESPIRATION RATE: 16 BRPM | SYSTOLIC BLOOD PRESSURE: 115 MMHG | HEART RATE: 66 BPM | HEIGHT: 65 IN

## 2023-03-01 DIAGNOSIS — E78.00 HYPERCHOLESTEROLEMIA: ICD-10-CM

## 2023-03-01 DIAGNOSIS — R19.7 DIARRHEA, UNSPECIFIED TYPE: ICD-10-CM

## 2023-03-01 DIAGNOSIS — Z90.49 HISTORY OF CHOLECYSTECTOMY: ICD-10-CM

## 2023-03-01 DIAGNOSIS — F43.9 STRESS: ICD-10-CM

## 2023-03-01 DIAGNOSIS — E66.01 CLASS 2 SEVERE OBESITY WITH SERIOUS COMORBIDITY AND BODY MASS INDEX (BMI) OF 35.0 TO 35.9 IN ADULT, UNSPECIFIED OBESITY TYPE (HCC): ICD-10-CM

## 2023-03-01 DIAGNOSIS — Z51.81 ENCOUNTER FOR THERAPEUTIC DRUG MONITORING: Primary | ICD-10-CM

## 2023-03-01 DIAGNOSIS — Z86.39 HISTORY OF MORBID OBESITY: ICD-10-CM

## 2023-03-01 DIAGNOSIS — I10 ESSENTIAL HYPERTENSION: ICD-10-CM

## 2023-03-01 DIAGNOSIS — Z90.3 HISTORY OF SLEEVE GASTRECTOMY: ICD-10-CM

## 2023-03-01 PROCEDURE — 3008F BODY MASS INDEX DOCD: CPT | Performed by: NURSE PRACTITIONER

## 2023-03-01 PROCEDURE — 3074F SYST BP LT 130 MM HG: CPT | Performed by: NURSE PRACTITIONER

## 2023-03-01 PROCEDURE — 99214 OFFICE O/P EST MOD 30 MIN: CPT | Performed by: NURSE PRACTITIONER

## 2023-03-01 PROCEDURE — 3078F DIAST BP <80 MM HG: CPT | Performed by: NURSE PRACTITIONER

## 2023-03-01 RX ORDER — FLUOXETINE HYDROCHLORIDE 20 MG/1
20 CAPSULE ORAL DAILY
Qty: 90 CAPSULE | Refills: 1 | Status: SHIPPED | OUTPATIENT
Start: 2023-03-01

## 2023-03-01 RX ORDER — PHENTERMINE AND TOPIRAMATE 11.25; 69 MG/1; MG/1
1 CAPSULE, EXTENDED RELEASE ORAL EVERY MORNING
Qty: 30 CAPSULE | Refills: 2 | Status: SHIPPED | OUTPATIENT
Start: 2023-03-01

## 2023-03-01 RX ORDER — PANCRELIPASE LIPASE, PANCRELIPASE PROTEASE, PANCRELIPASE AMYLASE 40000; 126000; 168000 [USP'U]/1; [USP'U]/1; [USP'U]/1
CAPSULE, DELAYED RELEASE ORAL
Qty: 48 CAPSULE | Refills: 0 | COMMUNITY
Start: 2023-03-01

## 2023-04-25 DIAGNOSIS — E66.01 MORBID OBESITY WITH BMI OF 40.0-44.9, ADULT (HCC): ICD-10-CM

## 2023-04-25 DIAGNOSIS — R63.8 CRAVING FOR PARTICULAR FOOD: ICD-10-CM

## 2023-04-25 DIAGNOSIS — Z51.81 ENCOUNTER FOR THERAPEUTIC DRUG MONITORING: ICD-10-CM

## 2023-04-27 RX ORDER — NALTREXONE HYDROCHLORIDE 50 MG/1
TABLET, FILM COATED ORAL
Qty: 30 TABLET | Refills: 2 | Status: SHIPPED | OUTPATIENT
Start: 2023-04-27

## 2023-05-01 DIAGNOSIS — G43.009 MIGRAINE WITHOUT AURA AND WITHOUT STATUS MIGRAINOSUS, NOT INTRACTABLE: ICD-10-CM

## 2023-05-02 RX ORDER — BUTALBITAL, ACETAMINOPHEN AND CAFFEINE 50; 325; 40 MG/1; MG/1; MG/1
1 TABLET ORAL EVERY 6 HOURS PRN
Qty: 30 TABLET | Refills: 0 | Status: SHIPPED | OUTPATIENT
Start: 2023-05-02 | End: 2023-09-08

## 2023-06-06 DIAGNOSIS — Z86.39 HISTORY OF MORBID OBESITY: ICD-10-CM

## 2023-06-06 DIAGNOSIS — F43.9 STRESS: ICD-10-CM

## 2023-06-06 DIAGNOSIS — Z51.81 ENCOUNTER FOR THERAPEUTIC DRUG MONITORING: ICD-10-CM

## 2023-06-06 DIAGNOSIS — E66.01 CLASS 2 SEVERE OBESITY WITH SERIOUS COMORBIDITY AND BODY MASS INDEX (BMI) OF 35.0 TO 35.9 IN ADULT, UNSPECIFIED OBESITY TYPE (HCC): ICD-10-CM

## 2023-06-10 RX ORDER — FLUOXETINE HYDROCHLORIDE 20 MG/1
20 CAPSULE ORAL DAILY
Qty: 90 CAPSULE | Refills: 1 | Status: SHIPPED | OUTPATIENT
Start: 2023-06-10

## 2023-06-10 RX ORDER — PHENTERMINE AND TOPIRAMATE 11.25; 69 MG/1; MG/1
1 CAPSULE, EXTENDED RELEASE ORAL EVERY MORNING
Qty: 30 CAPSULE | Refills: 2 | Status: SHIPPED | OUTPATIENT
Start: 2023-06-10

## 2023-08-08 ENCOUNTER — TELEMEDICINE (OUTPATIENT)
Dept: INTERNAL MEDICINE CLINIC | Facility: CLINIC | Age: 58
End: 2023-08-08
Payer: COMMERCIAL

## 2023-08-08 DIAGNOSIS — I10 ESSENTIAL HYPERTENSION: ICD-10-CM

## 2023-08-08 DIAGNOSIS — Z86.39 HISTORY OF MORBID OBESITY: ICD-10-CM

## 2023-08-08 DIAGNOSIS — F43.9 STRESS: ICD-10-CM

## 2023-08-08 DIAGNOSIS — Z51.81 ENCOUNTER FOR THERAPEUTIC DRUG MONITORING: Primary | ICD-10-CM

## 2023-08-08 DIAGNOSIS — E66.01 CLASS 2 SEVERE OBESITY WITH SERIOUS COMORBIDITY AND BODY MASS INDEX (BMI) OF 35.0 TO 35.9 IN ADULT, UNSPECIFIED OBESITY TYPE: ICD-10-CM

## 2023-08-08 DIAGNOSIS — E78.00 HYPERCHOLESTEROLEMIA: ICD-10-CM

## 2023-08-08 PROCEDURE — 99213 OFFICE O/P EST LOW 20 MIN: CPT | Performed by: NURSE PRACTITIONER

## 2023-08-08 RX ORDER — FLUOXETINE HYDROCHLORIDE 20 MG/1
40 CAPSULE ORAL DAILY
Qty: 180 CAPSULE | Refills: 1 | Status: SHIPPED | OUTPATIENT
Start: 2023-08-08

## 2023-09-03 DIAGNOSIS — Z51.81 ENCOUNTER FOR THERAPEUTIC DRUG MONITORING: ICD-10-CM

## 2023-09-03 DIAGNOSIS — E66.01 MORBID OBESITY WITH BMI OF 40.0-44.9, ADULT (HCC): ICD-10-CM

## 2023-09-03 DIAGNOSIS — K22.719 BARRETT'S ESOPHAGUS WITH DYSPLASIA: ICD-10-CM

## 2023-09-03 DIAGNOSIS — R63.8 CRAVING FOR PARTICULAR FOOD: ICD-10-CM

## 2023-09-03 DIAGNOSIS — G43.009 MIGRAINE WITHOUT AURA AND WITHOUT STATUS MIGRAINOSUS, NOT INTRACTABLE: ICD-10-CM

## 2023-09-05 NOTE — TELEPHONE ENCOUNTER
Requesting   Requested Prescriptions     Pending Prescriptions Disp Refills    NALTREXONE 50 MG Oral Tab [Pharmacy Med Name: NALTREXONE 50 MG TABLET] 30 tablet 2     Sig: TAKE 1 TABLET BY MOUTH EVERY DAY WITH FOOD FOR CRAVINGS     LOV: 8/8/23  RTC: 3 months  Filled: 4/27/23 #30 with 2 refills    No future appointments.

## 2023-09-07 RX ORDER — NALTREXONE HYDROCHLORIDE 50 MG/1
50 TABLET, FILM COATED ORAL
Qty: 90 TABLET | Refills: 0 | Status: SHIPPED | OUTPATIENT
Start: 2023-09-07

## 2023-09-08 DIAGNOSIS — I10 ESSENTIAL HYPERTENSION: ICD-10-CM

## 2023-09-08 RX ORDER — PANTOPRAZOLE SODIUM 40 MG/1
TABLET, DELAYED RELEASE ORAL
Qty: 30 TABLET | Refills: 0 | Status: SHIPPED | OUTPATIENT
Start: 2023-09-08 | End: 2023-09-27 | Stop reason: SDUPTHER

## 2023-09-08 RX ORDER — BUTALBITAL, ACETAMINOPHEN AND CAFFEINE 50; 325; 40 MG/1; MG/1; MG/1
1 TABLET ORAL EVERY 6 HOURS PRN
Qty: 30 TABLET | Refills: 0 | Status: SHIPPED | OUTPATIENT
Start: 2023-09-08 | End: 2023-09-08 | Stop reason: SDUPTHER

## 2023-09-08 RX ORDER — LISINOPRIL 20 MG/1
TABLET ORAL
Qty: 30 TABLET | Refills: 0 | Status: SHIPPED | OUTPATIENT
Start: 2023-09-08 | End: 2023-09-27 | Stop reason: SDUPTHER

## 2023-09-08 RX ORDER — BUTALBITAL, ACETAMINOPHEN AND CAFFEINE 50; 325; 40 MG/1; MG/1; MG/1
1 TABLET ORAL EVERY 6 HOURS PRN
Qty: 30 TABLET | Refills: 0 | Status: SHIPPED | OUTPATIENT
Start: 2023-09-08 | End: 2023-09-27 | Stop reason: SDUPTHER

## 2023-09-09 DIAGNOSIS — R19.7 DIARRHEA, UNSPECIFIED TYPE: ICD-10-CM

## 2023-09-09 DIAGNOSIS — Z90.49 HISTORY OF CHOLECYSTECTOMY: ICD-10-CM

## 2023-09-09 DIAGNOSIS — Z90.3 HISTORY OF SLEEVE GASTRECTOMY: ICD-10-CM

## 2023-09-09 DIAGNOSIS — Z51.81 ENCOUNTER FOR THERAPEUTIC DRUG MONITORING: ICD-10-CM

## 2023-09-09 DIAGNOSIS — Z86.39 HISTORY OF MORBID OBESITY: ICD-10-CM

## 2023-09-09 DIAGNOSIS — E66.01 CLASS 2 SEVERE OBESITY WITH SERIOUS COMORBIDITY AND BODY MASS INDEX (BMI) OF 35.0 TO 35.9 IN ADULT, UNSPECIFIED OBESITY TYPE: ICD-10-CM

## 2023-09-12 RX ORDER — PANCRELIPASE LIPASE, PANCRELIPASE PROTEASE, PANCRELIPASE AMYLASE 40000; 126000; 168000 [USP'U]/1; [USP'U]/1; [USP'U]/1
CAPSULE, DELAYED RELEASE ORAL
Qty: 720 CAPSULE | Refills: 3 | Status: SHIPPED | OUTPATIENT
Start: 2023-09-12

## 2023-09-27 ENCOUNTER — OFFICE VISIT (OUTPATIENT)
Dept: INTERNAL MEDICINE | Age: 58
End: 2023-09-27

## 2023-09-27 VITALS
HEART RATE: 82 BPM | RESPIRATION RATE: 16 BRPM | SYSTOLIC BLOOD PRESSURE: 124 MMHG | BODY MASS INDEX: 31.34 KG/M2 | WEIGHT: 195 LBS | OXYGEN SATURATION: 100 % | HEIGHT: 66 IN | DIASTOLIC BLOOD PRESSURE: 68 MMHG | TEMPERATURE: 97.5 F

## 2023-09-27 DIAGNOSIS — K22.719 BARRETT'S ESOPHAGUS WITH DYSPLASIA: Primary | ICD-10-CM

## 2023-09-27 DIAGNOSIS — G43.009 MIGRAINE WITHOUT AURA AND WITHOUT STATUS MIGRAINOSUS, NOT INTRACTABLE: ICD-10-CM

## 2023-09-27 DIAGNOSIS — E78.2 HYPERLIPIDEMIA, MIXED: ICD-10-CM

## 2023-09-27 DIAGNOSIS — I10 ESSENTIAL HYPERTENSION: ICD-10-CM

## 2023-09-27 PROCEDURE — 99214 OFFICE O/P EST MOD 30 MIN: CPT | Performed by: INTERNAL MEDICINE

## 2023-09-27 PROCEDURE — 3078F DIAST BP <80 MM HG: CPT | Performed by: INTERNAL MEDICINE

## 2023-09-27 PROCEDURE — 3074F SYST BP LT 130 MM HG: CPT | Performed by: INTERNAL MEDICINE

## 2023-09-27 RX ORDER — PANCRELIPASE LIPASE, PANCRELIPASE PROTEASE, PANCRELIPASE AMYLASE 40000; 126000; 168000 [USP'U]/1; [USP'U]/1; [USP'U]/1
CAPSULE, DELAYED RELEASE ORAL
COMMUNITY
Start: 2023-09-12

## 2023-09-27 RX ORDER — BUTALBITAL, ACETAMINOPHEN AND CAFFEINE 50; 325; 40 MG/1; MG/1; MG/1
1 TABLET ORAL EVERY 6 HOURS PRN
Qty: 30 TABLET | Refills: 0 | Status: SHIPPED | OUTPATIENT
Start: 2023-09-27

## 2023-09-27 RX ORDER — LISINOPRIL 20 MG/1
20 TABLET ORAL DAILY
Qty: 90 TABLET | Refills: 1 | Status: SHIPPED | OUTPATIENT
Start: 2023-09-27 | End: 2023-10-20

## 2023-09-27 RX ORDER — PANTOPRAZOLE SODIUM 40 MG/1
40 TABLET, DELAYED RELEASE ORAL DAILY
Qty: 90 TABLET | Refills: 1 | Status: SHIPPED | OUTPATIENT
Start: 2023-09-27

## 2023-09-27 ASSESSMENT — PATIENT HEALTH QUESTIONNAIRE - PHQ9
SUM OF ALL RESPONSES TO PHQ9 QUESTIONS 1 AND 2: 0
1. LITTLE INTEREST OR PLEASURE IN DOING THINGS: NOT AT ALL
SUM OF ALL RESPONSES TO PHQ9 QUESTIONS 1 AND 2: 0
2. FEELING DOWN, DEPRESSED OR HOPELESS: NOT AT ALL
CLINICAL INTERPRETATION OF PHQ2 SCORE: NO FURTHER SCREENING NEEDED

## 2023-10-08 DIAGNOSIS — I10 ESSENTIAL HYPERTENSION: ICD-10-CM

## 2023-10-11 ENCOUNTER — E-ADVICE (OUTPATIENT)
Dept: INTERNAL MEDICINE | Age: 58
End: 2023-10-11

## 2023-10-13 DIAGNOSIS — Z51.81 ENCOUNTER FOR THERAPEUTIC DRUG MONITORING: ICD-10-CM

## 2023-10-13 DIAGNOSIS — E66.01 CLASS 2 SEVERE OBESITY WITH SERIOUS COMORBIDITY AND BODY MASS INDEX (BMI) OF 35.0 TO 35.9 IN ADULT, UNSPECIFIED OBESITY TYPE: ICD-10-CM

## 2023-10-13 DIAGNOSIS — Z86.39 HISTORY OF MORBID OBESITY: ICD-10-CM

## 2023-10-13 RX ORDER — PHENTERMINE AND TOPIRAMATE 11.25; 69 MG/1; MG/1
1 CAPSULE, EXTENDED RELEASE ORAL EVERY MORNING
Qty: 30 CAPSULE | Refills: 2 | Status: SHIPPED | OUTPATIENT
Start: 2023-10-13

## 2023-10-13 NOTE — TELEPHONE ENCOUNTER
Requesting   Requested Prescriptions     Pending Prescriptions Disp Refills    QSYMIA 11.25-69 MG Oral Capsule SR 24 Hr [Pharmacy Med Name: QSYMIA 11.25 MG-69 MG CAPSULE] 30 capsule 2     Sig: TAKE 1 CAPSULE BY MOUTH EVERY DAY IN THE MORNING     LOV: 8/8/23  RTC: 3 months  Filled: 6/10/23 #30 with 2 refills    No future appointments.

## 2023-10-20 ENCOUNTER — LAB SERVICES (OUTPATIENT)
Dept: LAB | Age: 58
End: 2023-10-20

## 2023-10-20 DIAGNOSIS — I10 ESSENTIAL HYPERTENSION: ICD-10-CM

## 2023-10-20 LAB
ALBUMIN SERPL-MCNC: 3.3 G/DL (ref 3.6–5.1)
ALBUMIN/GLOB SERPL: 1.1 {RATIO} (ref 1–2.4)
ALP SERPL-CCNC: 74 UNITS/L (ref 45–117)
ALT SERPL-CCNC: 21 UNITS/L
ANION GAP SERPL CALC-SCNC: 13 MMOL/L (ref 7–19)
AST SERPL-CCNC: 17 UNITS/L
BILIRUB SERPL-MCNC: 0.4 MG/DL (ref 0.2–1)
BUN SERPL-MCNC: 19 MG/DL (ref 6–20)
BUN/CREAT SERPL: 20 (ref 7–25)
CALCIUM SERPL-MCNC: 9.2 MG/DL (ref 8.4–10.2)
CHLORIDE SERPL-SCNC: 106 MMOL/L (ref 97–110)
CHOLEST SERPL-MCNC: 192 MG/DL
CHOLEST/HDLC SERPL: 1.9 {RATIO}
CO2 SERPL-SCNC: 29 MMOL/L (ref 21–32)
CREAT SERPL-MCNC: 0.95 MG/DL (ref 0.51–0.95)
EGFRCR SERPLBLD CKD-EPI 2021: 70 ML/MIN/{1.73_M2}
FASTING DURATION TIME PATIENT: ABNORMAL H
GLOBULIN SER-MCNC: 2.9 G/DL (ref 2–4)
GLUCOSE SERPL-MCNC: 86 MG/DL (ref 70–99)
HDLC SERPL-MCNC: 102 MG/DL
LDLC SERPL CALC-MCNC: 84 MG/DL
NONHDLC SERPL-MCNC: 90 MG/DL
POTASSIUM SERPL-SCNC: 4.6 MMOL/L (ref 3.4–5.1)
PROT SERPL-MCNC: 6.2 G/DL (ref 6.4–8.2)
SODIUM SERPL-SCNC: 143 MMOL/L (ref 135–145)
TRIGL SERPL-MCNC: 28 MG/DL

## 2023-10-20 PROCEDURE — 36415 COLL VENOUS BLD VENIPUNCTURE: CPT | Performed by: INTERNAL MEDICINE

## 2023-10-20 PROCEDURE — 80053 COMPREHEN METABOLIC PANEL: CPT | Performed by: INTERNAL MEDICINE

## 2023-10-20 PROCEDURE — 80061 LIPID PANEL: CPT | Performed by: INTERNAL MEDICINE

## 2023-10-20 RX ORDER — LISINOPRIL 20 MG/1
TABLET ORAL
Qty: 90 TABLET | Refills: 0 | Status: SHIPPED | OUTPATIENT
Start: 2023-10-20

## 2023-10-23 DIAGNOSIS — E78.2 HYPERLIPIDEMIA, MIXED: Primary | ICD-10-CM

## 2023-12-14 DIAGNOSIS — I10 ESSENTIAL HYPERTENSION: ICD-10-CM

## 2023-12-15 RX ORDER — LISINOPRIL 20 MG/1
20 TABLET ORAL DAILY
Qty: 90 TABLET | Refills: 0 | Status: SHIPPED | OUTPATIENT
Start: 2023-12-15

## 2023-12-17 DIAGNOSIS — R63.8 CRAVING FOR PARTICULAR FOOD: ICD-10-CM

## 2023-12-17 DIAGNOSIS — E66.01 MORBID OBESITY WITH BMI OF 40.0-44.9, ADULT (HCC): ICD-10-CM

## 2023-12-17 DIAGNOSIS — Z51.81 ENCOUNTER FOR THERAPEUTIC DRUG MONITORING: ICD-10-CM

## 2023-12-18 RX ORDER — NALTREXONE HYDROCHLORIDE 50 MG/1
50 TABLET, FILM COATED ORAL
Qty: 30 TABLET | Refills: 1 | Status: SHIPPED | OUTPATIENT
Start: 2023-12-18

## 2023-12-18 NOTE — TELEPHONE ENCOUNTER
Requesting   Requested Prescriptions     Pending Prescriptions Disp Refills    NALTREXONE 50 MG Oral Tab [Pharmacy Med Name: NALTREXONE 50 MG TABLET] 30 tablet 2     Sig: TAKE 1 TABLET (50 MG TOTAL) BY MOUTH DAILY WITH FOOD.      LOV: 8/8/23  RTC: 3 months  Filled: 9/7/23 #90 with 0 refills    Future Appointments   Date Time Provider Ole Llanes   3/28/2024  8:40 AM KATERIN Murillo EMGMILESI [facility] [address]

## 2023-12-18 NOTE — TELEPHONE ENCOUNTER
Courtesy refill. LOV in clinic was 3/2023. Please schedule in clinic only on 1/12/24, Friday.  Thanks

## 2024-01-22 ENCOUNTER — OFFICE VISIT (OUTPATIENT)
Dept: INTERNAL MEDICINE CLINIC | Facility: CLINIC | Age: 59
End: 2024-01-22
Payer: COMMERCIAL

## 2024-01-22 VITALS
HEIGHT: 65 IN | HEART RATE: 58 BPM | OXYGEN SATURATION: 98 % | BODY MASS INDEX: 33.82 KG/M2 | DIASTOLIC BLOOD PRESSURE: 80 MMHG | SYSTOLIC BLOOD PRESSURE: 128 MMHG | RESPIRATION RATE: 16 BRPM | WEIGHT: 203 LBS

## 2024-01-22 DIAGNOSIS — E66.9 CLASS 1 OBESITY WITH SERIOUS COMORBIDITY AND BODY MASS INDEX (BMI) OF 33.0 TO 33.9 IN ADULT, UNSPECIFIED OBESITY TYPE: ICD-10-CM

## 2024-01-22 DIAGNOSIS — F43.9 STRESS: ICD-10-CM

## 2024-01-22 DIAGNOSIS — E78.00 HYPERCHOLESTEROLEMIA: ICD-10-CM

## 2024-01-22 DIAGNOSIS — R63.8 CRAVING FOR PARTICULAR FOOD: ICD-10-CM

## 2024-01-22 DIAGNOSIS — Z51.81 ENCOUNTER FOR THERAPEUTIC DRUG MONITORING: Primary | ICD-10-CM

## 2024-01-22 DIAGNOSIS — Z90.3 HISTORY OF SLEEVE GASTRECTOMY: ICD-10-CM

## 2024-01-22 DIAGNOSIS — Z86.39 HISTORY OF MORBID OBESITY: ICD-10-CM

## 2024-01-22 DIAGNOSIS — I10 ESSENTIAL HYPERTENSION: ICD-10-CM

## 2024-01-22 PROCEDURE — 3079F DIAST BP 80-89 MM HG: CPT | Performed by: NURSE PRACTITIONER

## 2024-01-22 PROCEDURE — 99214 OFFICE O/P EST MOD 30 MIN: CPT | Performed by: NURSE PRACTITIONER

## 2024-01-22 PROCEDURE — 3008F BODY MASS INDEX DOCD: CPT | Performed by: NURSE PRACTITIONER

## 2024-01-22 PROCEDURE — 3074F SYST BP LT 130 MM HG: CPT | Performed by: NURSE PRACTITIONER

## 2024-01-22 RX ORDER — FLUOXETINE HYDROCHLORIDE 20 MG/1
40 CAPSULE ORAL DAILY
Qty: 180 CAPSULE | Refills: 1 | Status: SHIPPED | OUTPATIENT
Start: 2024-01-22

## 2024-01-22 RX ORDER — TIRZEPATIDE 2.5 MG/.5ML
2.5 INJECTION, SOLUTION SUBCUTANEOUS WEEKLY
Qty: 2 ML | Refills: 0 | Status: SHIPPED | OUTPATIENT
Start: 2024-01-22

## 2024-01-22 RX ORDER — NALTREXONE HYDROCHLORIDE 50 MG/1
50 TABLET, FILM COATED ORAL
Qty: 90 TABLET | Refills: 1 | Status: SHIPPED | OUTPATIENT
Start: 2024-01-22

## 2024-01-22 RX ORDER — TIRZEPATIDE 5 MG/.5ML
5 INJECTION, SOLUTION SUBCUTANEOUS WEEKLY
Qty: 2 ML | Refills: 1 | Status: SHIPPED | OUTPATIENT
Start: 2024-01-22

## 2024-01-22 NOTE — PROGRESS NOTES
Sindy Torres is a 58 year old female presents today for follow-up on medical weight loss program for the treatment of overweight, obesity, or morbid obesity with hx of VSG in 5/2021 and associated HTN, OA, GERD.    S:  Current weight   Wt Readings from Last 6 Encounters:   01/22/24 203 lb (92.1 kg)   03/01/23 202 lb (91.6 kg)   12/13/22 206 lb (93.4 kg)   11/30/22 210 lb (95.3 kg)   09/29/22 212 lb (96.2 kg)   06/23/22 209 lb (94.8 kg)    AND BMI Body mass index is 33.78 kg/m²..    Patient has lost -1# since LOV in 8/2023 via VV and in clinic in 3/2023. Off Qsymia for the past 2 weeks d/t lapse in f/u. Off lifestyle. Increased snacking and Door Dash delivery. Living solo at home in new sedentary work position. ZenPep has helped with previous GI complaints, but does find there are certain foods she does not tolerated anymore since bariatric surgery.    Testing/consult completed since LOV: Dietician: Estimated caloric needs for weight loss: 1550 cals/d for 2 pounds/week weight loss.    [facility] Follow Up    Question 1/18/2024 12:58 PM CST - Filed by Patient   Please describe a success moment: None   Please describe a challenging moment/needs for improvement: Food   Please complete this 24 hour food journal, listing everything you had to eat in the past day. Include the average time of day you ate these meals at    List foods, qty and prep for breakfast: Protien bar 1   List foods, qty and prep for lunch. Soups   List foods, qty and prep for dinner. Protien   List foods, qty and prep for snacks. Chips   List the types and qty of fluids consumed Water and tea   On average, how many meals did you eat out per week? 0   Exercise    How many days per week are you active or exercise 2   On average, how many days were anaerobic (strength/resistance) exercises performed? 2   On average, how many days were aerobic (cardio) exercises performed? 2   Perceived level of exertion on a scale of 1-5, with 5 being  very intense: 2   Stress    Average stress level on a scale of 1-10, with 10 being extremely stressed: 5   If greater than 5/1O how would you grade your coping mechanisms? moderate   Sleep hours and integrity    How many hours of uninterrupted sleep do you get a night: 5   Do you feel rested in the morning: Yes   If no, what may have been disrupting your sleep?    Please list any goal(s) for your next visit Weight loss, exercise, healthly eating       Social hx and PMH reviewed. Employed from home full time.  with 3 kids, 1 set of twins. Good spousal support.    REVIEW OF SYSTEMS:  GENERAL: feels well otherwise  LUNGS: denies shortness of breath with exertion  CARDIOVASCULAR: denies chest pain on exertion, denies palpitations or pedal edema  GI: denies abdominal pain.  No N/V/C, see above  MUSCULOSKELETAL: no acute joint or muscle pain  NEURO: denies headaches or dizziness. Migraines stable.  PSYCH: denies change in behavior or mood, denies feeling sad or depressed. Answers submitted by the patient for this visit:  Medical Weight Loss Follow Up (Submitted on 1/18/2024)  If greater than 5/1O how would you grade your coping mechanisms?: moderate      EXAM:  /80   Pulse 58   Resp 16   Ht 5' 5\" (1.651 m)   Wt 203 lb (92.1 kg)   LMP  (LMP Unknown)   SpO2 98%   BMI 33.78 kg/m²   GENERAL: well developed, well nourished, in no apparent distress, obese  EYES: conjunctiva pink, sclera non icteric, PERRLA  LUNGS: CTA in all fields, breathing non labored  CARDIO: regular rhythm without murmur, normal S1 and S2 without clicks or gallops, no pedal edema.  GI: +BS  NEURO/MS: motor and sensory grossly intact  PSYCH: pleasant, cooperative, normal mood and affect    ASSESSMENT AND PLAN:  Encounter Diagnoses   Name Primary?    Encounter for therapeutic drug monitoring Yes    Class 1 obesity with serious comorbidity and body mass index (BMI) of 33.0 to 33.9 in adult, unspecified obesity type     History of morbid  obesity     History of sleeve gastrectomy     Hypercholesterolemia     Essential hypertension     Craving for particular food     Stress        No orders of the defined types were placed in this encounter.      Meds & Refills for this Visit:  Requested Prescriptions     Signed Prescriptions Disp Refills    Tirzepatide-Weight Management (ZEPBOUND) 2.5 MG/0.5ML Subcutaneous Solution Auto-injector 2 mL 0     Sig: Inject 2.5 mg into the skin once a week.    Tirzepatide-Weight Management (ZEPBOUND) 5 MG/0.5ML Subcutaneous Solution Auto-injector 2 mL 1     Sig: Inject 5 mg into the skin once a week. Start after completing full 4 weeks on 2.5 mg weekly dose.    naltrexone 50 MG Oral Tab 90 tablet 1     Sig: Take 1 tablet (50 mg total) by mouth daily with food. For cravings    FLUoxetine 20 MG Oral Cap 180 capsule 1     Sig: Take 2 capsules (40 mg total) by mouth daily.       Imaging & Consults:  None      Plan:  Patient has lost -1# since LOV in 3/2023 on Naltrexone 50 mg daily, fluoxetine 40 mg daily, off Qsymia daily with a total weight loss of 64# since initial consult on 5/21/2019 with initial weight of 267#.  Weight loss goal:  lose 40# in 6 months, 75# in 1 year. BP controlled. CPM, remaining off Qsymia and start Zepbound as directed if affordable. If not reviewed alternatives.  on setting up a healthy routine, resources for meal prep/planning support and recommend f/u with dietician. See patient instructions below for additional plans and patient counseling.    Patient Instructions   Continue making lifestyle changes that focus on good nutrition, regular exercise and stress management.    Medication Plan: Continue current medication regimen aside from remain off Qsymia and switch to Zepbound. Start Zepbound at 2.5 mg weekly. After 4 weeks start the next dose of 5 mg weekly with additional refill. Visit the website www.zepbound.Pleth[employer].Pluromed for coupon and further education on dosing. This medication may require a  prior authorization (PA) by your insurance. A PA may take one week plus to complete and our office will be in touch during this process if needed. If cost prohibitive plan: generic alternative to Qsymia with phentermine and topamax.    Tips while taking an injectable weight loss medication:    Be an intuitive eater. Listen to your hunger and fullness signals, stopping when you are full.  Consume protein and produce in your day, striving for a rainbow of color of produce.  Reduce portions to staring size of 1 cup size and check in with your gut to see if you are full. Use a sand timer to slow down your eating pace to allow for 15-20 minutes to complete a meal.  Reduce refined sugars and high fat foods, as they may contribute to greater side effects of nausea and heartburn.  Stop eating 3 hours before bedtime to allow your food to digest.  Remain hydrated with water or non caloric and non caffeine beverages.  Use over the counter edwin lozenge/supplement to help reduce nausea if needed.    Next steps to work on before next office visit include: Reflect on your health goals this new year! Consider a meal program to go as highlighted below. A gym membership will help maintain community and fitness consistency.    Building a Healthy Routine for a Healthy Lifestyle  March 3, 2023  Posted in Blog, Motivation, Support  By Your Weight Matters Campaign    There are some people who seem to get everything done. They stay on top of tasks at work, manage their health and even spend time doing family activities. These people make it seem easy as they effortlessly go through life without missing a beat. Others seem to always be playing catch-up, feeling constantly behind and consistently stressed.    What’s the difference between these two people? Building a healthy routine.    What is a Routine?  A routine is a set of actions you do over and over again, consistently. Most people have many routines in their lives. For example, we  make routines out of making coffee in the morning and driving to work. Turning on your computer and checking emails as your first work task is also a routine. These are things you do automatically as part of your day.    However, not all routines are healthy. For instance, you can make an unhealthy routine out of going through the drive-thru for breakfast each morning. Maybe you routinely spend an hour or more watching television on the couch each night.    If you find yourself caught in one or more unhealthy routines, take some time to see how you can improve your lifestyle. Building a set of healthy routines throughout your day can make a big difference in both your physical and mental health.    Tips for Building a Healthy Routine  How do you get started? Building a healthy routine can be overwhelming to think about in the beginning stages, so keep these fundamental steps in mind.    Decide what’s important. Your routine can include a lot of different behaviors, so take some time to decide what your priorities are. Are you trying to eat better? Perhaps start by meal prepping on Sundays, making a balanced breakfast each morning or drinking at least three cups of water before lunchtime.    Determine where your routines should fit in. It’s sometimes obvious where in your lifestyle you need to develop healthier routines. It’s also not so obvious some of the time. Take a while to think about what parts of your day need some work. If you struggle to wake up or feel motivated in the morning, perhaps you could add a quick 10-minute walk to your routine. If your lunch breaks are usually spent sitting down at a desk or table, perhaps you could plan a short walk for this time instead.    Give your routine a try. Try your new routine for a few days and see how it’s working. If all is going well, keep your routine up. Eventually, it will become habitual and a lot easier to stick to. If your routine isn’t working out, don’t  feel any shame! Modify it and keep experimenting.    Don’t get overwhelmed. Building a routine that works for you can take time. Don’t expect for it to happen overnight. Give yourself some time to practice and build consistency. Save plenty of room for modification and for celebrating success!    Routines That Can Improve Your Health  Need a few ideas for routines that can help you improve your health? Try one or more of the tips below and see the difference they can make in your life.    Get up a little earlier. Mornings can be stressful when we rush to get everything together for the day. We’ve all had moments where chaotic mornings make it hard to even walk out the door! Having a few extra minutes in the morning can be a game-changer. Try setting your alarm for 30 minutes earlier to allow yourself time to sip on coffee, review your agenda, throw some dinner in a crock pot, write in a journal, or do whatever you need to do you can start your day on a positive note.    Prioritize sleep. People function better when they’re well-rested. Sleep should be a priority in your life, but many of us are guilty of letting ours fall to the wayside. Try making a bedtime ritual to ensure you are getting seven to eight hours of sleep each night. You will feel more energized and focused.    Make a plan. Writing a to-do list in the morning or the night before can help you stay on top of your responsibilities. Being able to see all your tasks in one place can help you stay focused and organized. For bonus points, delegate a task or two to your family. If someone else can do the grocery shopping while you run other errands or fold laundry, this can save you a lot of time and stress!     Don’t feel ashamed to ask for help.  We all have different personalities, schedules, likes and dislikes. Each one of our routines will be different, so try not to compare yourself to others. What will yours look like?    Set your food environment up  for success with tips listed below to help support your health journey and provide less temptations!  How to Create a Healthy Food Environment in Your Home  December 28, 2020  Posted in Blog, Nutrition  By Your Weight Matters Campaign    Why create a healthy food environment in your home? Every day, decisions we make about food are influenced by hundreds of factors, many of them subconscious. For example, an open box of cookies on the counter is more tempting than an apple in your crisper.  Having a healthy food environment at home will make it easier to choose healthy options and stay on track with habits that support your health goals.  Here are a few steps you can take to set up your home for success.  How to Build a Healthy Food Environment  1 - Clear Your Home of Less-healthy Food  These foods are okay in moderation, but they can be enjoyed outside of the house in a fun and empowering way. Go through your kitchen fridge, freezer, pantry, cabinets and counters to toss out foods that aren't helping you meet your health goals.  2 - Put Healthy Foods in Plain Sight  Keep grab-n-go fruit on the counter instead of snack foods so they are easily accessible. Consider moving your refrigerated fruit to a shelf at eye-level instead of keeping it in the crisper. Vice versa, move less healthy foods to the crisper so they aren't always in your line of sight.  3 - Find Healthy Substitutions  Feeling deprived of your favorite foods can make you miserable and resentful. Instead, experiment with healthier alternatives. Here are some examples:  Calorie-controlled frozen yogurt bars like Yasso vs high-calorie ice cream novelties  Sparkling water like Bubbly vs soda or fruit juice  Skinny popcorn vs chips and crackers  Dessert hummus vs pudding cups or ice cream  4 - Clean and Organize  A messy kitchen can make it hard to find healthy choices when you are hungry or short on time. It also makes it hard to locate whether or not you  need something from the grocery store. Give your kitchen a new year makeover and organize your food storage areas so you can always locate what you need without distraction.  5 - Eat at Your Kitchen Table  Eating at your kitchen table without distractions is a way to eat more mindfully and enjoy time with your family. Eating on the couch while watching shows or movies can encourage mindless eating and consuming more calories than intended.  6 - Keep Healthy Foods Readily Available  When you are hungry and in a pinch, it's important to have healthy foods on-hand so you don't make hunger-controlled choices. This means keeping ready-to-go-snack foods in your kitchen such as fruit paired with nut butter, hummus and carrots, lunch meat and cheese roll-ups, etc. It may also help to keep pre-made freezer meals on-hand (something you've cooked in advance) for busy weeknights when you don't have time to prepare food.  Turning your home into a healthy food environment can set you up for success and confident decision-making. Plus, it feels good to be organized and empowered!    Patient Resources:    Personal Training/Fitness Classes/Health Coaching    [facility] in [place]: Full fitness center with group fitness and personal training located in [place].  Health Coaching with Veronica Milner, German Reddy, and Corey Jones at our [facility]- individual coaching to work on your health goals. Call [phone] and/or email @ [email or web address]. Free 60 minute consult when client of [facility].  LAINEY Benton @ http://www.[email or web address] CenterjaquiHospitals in [facility] IslandBio-[facility] International[facility].EDITD. A variety of group fitness options plus various yoga classes [phone] and/or email Poonam at [email or web address]  [facility] Fitness Centers with multiple locations: diaDexus Fitness (www.Binary Thumb.EDITD), F45 Training (www.n31gdrusiqe.EDITD), Fit Body Bootcamp (www.Software 2000bodybootcamp.EDITD), MiTu Network  (www.LiveHive Systems.Joules Clothing), The Exercise  (www.exercisecoach.com), Club Pilates (www.clubpilates.Joules Clothing)    Online Fitness  Fitness  on Utube  Fit in 10 DVD series   www.thuge53XPU.Joules Clothing  Chair exercises via Sit and Be Fit (www.sitandbefit.org) and Nu-Med Plus (www.Coinbase.com) or Varun Javier or Simeon Shaffer videos on YouTube.  Hip Hop Fit with Jovany Lanier at www.hiphopfit.net    Apps for on the Go Fitness  Tenebril 7 Minute Workout (orange box with white 7) - free on the go HIIT training david  Peloton David @ www.onepeloton.com    Nutrition Trackers and Programs  LoseIT! And My Fitness Pal apps and on line for tracking nutrition  NOOM - virtual health coaching  FitFoundation (healthy meals on the go) in [place] @ www.efdlaseunxnlk3yg4interactive  Bistrluis carlos MD @ wwwWinkcambistrCollegeZen and Ifhbqz86 (calorie smart and low carb plans recommended) @ www.iiidji39.com, Metabolic Meals @ www.Insiders@ ProjectMetabolicMeals.com - individual prepared meals to go  Gobble, Blue Apron, Home , Every Plate, Sunbasket- on line meal delivery programs for preparation at home  Meal Village in [place] for homemade meals to go @ wwwWinkcammealMeliuzage.Joules Clothing  Diet Doctor @ www.dietdoctor.com - low carb swaps  SiteMinder - meal prep and planning david (www.yummly.com)    Stress, Anxiety, Depression, Trauma  CALM meditation david (www.calm.com)  Headspace  Don't let anxiety run your life. Using the science of emotion regulation and mindfulness to overcome fear and worry by Ja Garland PsyD and Esteban Box MA.  The [address] Podcast (September 27, 2023): 6 Magic Words That Stop Anxiety  What Happened to You?- a look at the impact trauma has on behavior written by Stephanie Mann and Dr. Slick Small  Whole Again by Maurziio Bridges - discovering your true self after trauma    Mindful Eating/The Hungry Brain  Am I Hungry? Mindful eating virtual  david (www.amihungry.com)  The Hungry Brain by Kaitlin Dimas, PhD  Mindless Eating by Elroy Gardner  Weight  Loss Surgery Will Not Treat Food Addiction by Racheal Ayoub Ph.D    Metabolic Dysfunction, Hormones and Cravings  Why We Get Sick? By Scotty Gomez (insulin resistance)  Your Body in Balance: The New Science of Food, Hormones, and Health by Dr. Adal Beckham  The Complete Guide to fasting by Dr. Meneses  Fast Like a Girl by Dr. Afsaneh Muñiz  The Menopause Reset by Dr. Afsaneh Muñiz  Sugar, Salt & Fat by Camelia Dickerson, Ph.D, R.D.  The Truth About Sugar - documentary on sugar (Free on [employer], https://Financial Fairy Talesu.be/8U9tipbXZ5v)  Reverse Visceral Fat: #1 Way to Increase Your Lifespan & End Inflammation with Dr. Adithya Felix on Utube @ https://Financial Fairy Talesu.be/nupPRnvUpJY?si=vo2cdiPqMDI9NuwR    Nutrition Plans  You Are What You Eat - Netfix series on twin study looking at impact of nutrition changes on health  The End of Dieting: How to Live for Life by Dr. Neptali Klein M.D. or listen to The DoctorBase Podcast Episode 63: Understanding \"Nutritarian\" Eating w/Dr. Neptali Klein  The Game Changers- Live Life 360 Documentary on plant based nutrition  The Dr. Veronica  Wellness Plan by Dr. Beto Veronica MD  The Complete Guide to fasting by Dr. Meneses    Education, Motivation and Support Resources  Live to 100: Secrets of the Blue Zones - [employer] series on the secrets to communities living over 100 years old  Atomic Habits by Lion Wallace (a book about taking small steps to promote greater behavior change)   Motivation kiarra (black box with white \")- daily supportive messages sent to your phone  Can't Hurt Me by Ja Cherry (a book exploring the power of discipline in achieving your goals)  Fed Up - documentary about obesity (Free on [employer])  Www.yourweightmatters.org - Obesity Action Coalition sponsored Blog posts  Obesity Action Coalition Resources on topics specific to weight management (www.obesityaction.org)  Fitlosophy Fitspiration - journal to better health (journal book found at [employer] in fitness aisle)  Bebeto Pinto talk titled: The Call to  Courage (Netflix)  The Exam Room by the Physician's Committee (Podcast)  Nutrition Facts by Dr. Raymundo (Podcast)      Medication use and SEs reviewed with patient.    Return in about 2 months (around 3/22/2024) for weight management via clinic.    Patient verbalizes understanding.    DOCUMENTATION OF TIME SPENT: Code selection for this visit was based on time spent : 30 minutes on date of service in preparing to see the patient, obtaining and/or reviewing separately obtained history, performing a medically appropriate examination, counseling and educating the patient/family/caregiver, ordering medications or testing, referring and communicating with other healthcare providers, documenting clinical information in the electronic medical record, independently interpreting results and communicating results to the patient/family/caregiver and care coordination with the patient's other providers.

## 2024-01-22 NOTE — PATIENT INSTRUCTIONS
Continue making lifestyle changes that focus on good nutrition, regular exercise and stress management.    Medication Plan: Continue current medication regimen aside from remain off Qsymia and switch to Zepbound. Start Zepbound at 2.5 mg weekly. After 4 weeks start the next dose of 5 mg weekly with additional refill. Visit the website www.zepbound.[employer] for coupon and further education on dosing. This medication may require a prior authorization (PA) by your insurance. A PA may take one week plus to complete and our office will be in touch during this process if needed. If cost prohibitive plan: generic alternative to Qsymia with phentermine and topamax.    Tips while taking an injectable weight loss medication:    Be an intuitive eater. Listen to your hunger and fullness signals, stopping when you are full.  Consume protein and produce in your day, striving for a rainbow of color of produce.  Reduce portions to staring size of 1 cup size and check in with your gut to see if you are full. Use a sand timer to slow down your eating pace to allow for 15-20 minutes to complete a meal.  Reduce refined sugars and high fat foods, as they may contribute to greater side effects of nausea and heartburn.  Stop eating 3 hours before bedtime to allow your food to digest.  Remain hydrated with water or non caloric and non caffeine beverages.  Use over the counter edwin lozenge/supplement to help reduce nausea if needed.    Next steps to work on before next office visit include: Reflect on your health goals this new year! Consider a meal program to go as highlighted below. A gym membership will help maintain community and fitness consistency.    Building a Healthy Routine for a Healthy Lifestyle  March 3, 2023  Posted in Blog, Motivation, Support  By Your Weight Matters Campaign    There are some people who seem to get everything done. They stay on top of tasks at work, manage their health and even spend time doing family  activities. These people make it seem easy as they effortlessly go through life without missing a beat. Others seem to always be playing catch-up, feeling constantly behind and consistently stressed.    What’s the difference between these two people? Building a healthy routine.    What is a Routine?  A routine is a set of actions you do over and over again, consistently. Most people have many routines in their lives. For example, we make routines out of making coffee in the morning and driving to work. Turning on your computer and checking emails as your first work task is also a routine. These are things you do automatically as part of your day.    However, not all routines are healthy. For instance, you can make an unhealthy routine out of going through the drive-thru for breakfast each morning. Maybe you routinely spend an hour or more watching television on the couch each night.    If you find yourself caught in one or more unhealthy routines, take some time to see how you can improve your lifestyle. Building a set of healthy routines throughout your day can make a big difference in both your physical and mental health.    Tips for Building a Healthy Routine  How do you get started? Building a healthy routine can be overwhelming to think about in the beginning stages, so keep these fundamental steps in mind.    Decide what’s important. Your routine can include a lot of different behaviors, so take some time to decide what your priorities are. Are you trying to eat better? Perhaps start by meal prepping on Sundays, making a balanced breakfast each morning or drinking at least three cups of water before lunchtime.    Determine where your routines should fit in. It’s sometimes obvious where in your lifestyle you need to develop healthier routines. It’s also not so obvious some of the time. Take a while to think about what parts of your day need some work. If you struggle to wake up or feel motivated in the morning,  perhaps you could add a quick 10-minute walk to your routine. If your lunch breaks are usually spent sitting down at a desk or table, perhaps you could plan a short walk for this time instead.    Give your routine a try. Try your new routine for a few days and see how it’s working. If all is going well, keep your routine up. Eventually, it will become habitual and a lot easier to stick to. If your routine isn’t working out, don’t feel any shame! Modify it and keep experimenting.    Don’t get overwhelmed. Building a routine that works for you can take time. Don’t expect for it to happen overnight. Give yourself some time to practice and build consistency. Save plenty of room for modification and for celebrating success!    Routines That Can Improve Your Health  Need a few ideas for routines that can help you improve your health? Try one or more of the tips below and see the difference they can make in your life.    Get up a little earlier. Mornings can be stressful when we rush to get everything together for the day. We’ve all had moments where chaotic mornings make it hard to even walk out the door! Having a few extra minutes in the morning can be a game-changer. Try setting your alarm for 30 minutes earlier to allow yourself time to sip on coffee, review your agenda, throw some dinner in a crock pot, write in a journal, or do whatever you need to do you can start your day on a positive note.    Prioritize sleep. People function better when they’re well-rested. Sleep should be a priority in your life, but many of us are guilty of letting ours fall to the wayside. Try making a bedtime ritual to ensure you are getting seven to eight hours of sleep each night. You will feel more energized and focused.    Make a plan. Writing a to-do list in the morning or the night before can help you stay on top of your responsibilities. Being able to see all your tasks in one place can help you stay focused and organized. For bonus  points, delegate a task or two to your family. If someone else can do the grocery shopping while you run other errands or fold laundry, this can save you a lot of time and stress!     Don’t feel ashamed to ask for help.  We all have different personalities, schedules, likes and dislikes. Each one of our routines will be different, so try not to compare yourself to others. What will yours look like?    Set your food environment up for success with tips listed below to help support your health journey and provide less temptations!  How to Create a Healthy Food Environment in Your Home  December 28, 2020  Posted in Blog, Nutrition  By Your Weight Matters Campaign    Why create a healthy food environment in your home? Every day, decisions we make about food are influenced by hundreds of factors, many of them subconscious. For example, an open box of cookies on the counter is more tempting than an apple in your crisper.  Having a healthy food environment at home will make it easier to choose healthy options and stay on track with habits that support your health goals.  Here are a few steps you can take to set up your home for success.  How to Build a Healthy Food Environment  1 - Clear Your Home of Less-healthy Food  These foods are okay in moderation, but they can be enjoyed outside of the house in a fun and empowering way. Go through your kitchen fridge, freezer, pantry, cabinets and counters to toss out foods that aren't helping you meet your health goals.  2 - Put Healthy Foods in Plain Sight  Keep grab-n-go fruit on the counter instead of snack foods so they are easily accessible. Consider moving your refrigerated fruit to a shelf at eye-level instead of keeping it in the crisper. Vice versa, move less healthy foods to the crisper so they aren't always in your line of sight.  3 - Find Healthy Substitutions  Feeling deprived of your favorite foods can make you miserable and resentful. Instead, experiment with  healthier alternatives. Here are some examples:  Calorie-controlled frozen yogurt bars like Yasso vs high-calorie ice cream novelties  Sparkling water like Bubbly vs soda or fruit juice  Skinny popcorn vs chips and crackers  Dessert hummus vs pudding cups or ice cream  4 - Clean and Organize  A messy kitchen can make it hard to find healthy choices when you are hungry or short on time. It also makes it hard to locate whether or not you need something from the grocery store. Give your kitchen a new year makeover and organize your food storage areas so you can always locate what you need without distraction.  5 - Eat at Your Kitchen Table  Eating at your kitchen table without distractions is a way to eat more mindfully and enjoy time with your family. Eating on the couch while watching shows or movies can encourage mindless eating and consuming more calories than intended.  6 - Keep Healthy Foods Readily Available  When you are hungry and in a pinch, it's important to have healthy foods on-hand so you don't make hunger-controlled choices. This means keeping ready-to-go-snack foods in your kitchen such as fruit paired with nut butter, hummus and carrots, lunch meat and cheese roll-ups, etc. It may also help to keep pre-made freezer meals on-hand (something you've cooked in advance) for busy weeknights when you don't have time to prepare food.  Turning your home into a healthy food environment can set you up for success and confident decision-making. Plus, it feels good to be organized and empowered!    Patient Resources:    Personal Training/Fitness Classes/Health Coaching    [facility] in [place]: Full fitness center with group fitness and personal training located in [place].  Health Coaching with Veronica Milner, German Reddy, and Corey Jones at our [facility]- individual coaching to work on your health goals. Call [phone] and/or email @ [email or web address]. Free 60 minute consult  when client of [facility].  [facility] @ http://www.[address] [place] [address].Wantful. A variety of group fitness options plus various yoga classes [phone] and/or email Poonam at [email or web address]  [facility] Fitness Centers with multiple locations: uromovie (www.[employer]), Integromics5 Training (www.y59xjnbxqcdcopygram), RETAIL PRO Body Bootcamp (www.Mobstats.Wantful), Seren Photonics (www.[place]), The Exercise  (www.exercisecoach.com), Club Pilates (www.clubFarmacias Inteligentes 24.Wantful)    Online Fitness  Fitness  on WorldViz  Fit in 10 DVD series   www.bqllc96OCCcopygram  Chair exercises via Sit and Be Fit (www.sitandbefit.[employer]) and IntelliWare Systems (www.Trubion Pharmaceuticals.com) or Varun Javier or Simeon Shaffer videos on YouTube.  Hip Hop Fit with Jovany Lanier at www.hiphopfit.[employer]    Apps for on the Go Fitness  Fix That Bug 7 Minute Workout (orange box with white 7) - free on the go HIIT training david  Peloton David @ www.onepeloton.com    Nutrition Trackers and Programs  LoseIT! And My Fitness Pal apps and on line for tracking nutrition  NOOM - virtual health coaching  FitFoundation (healthy meals on the go) in [place] @ www.qjscxmhqsndmt7t.Wantful  Bisannabelle MD @ www.bistromd.Wantful and Akalwx57 (calorie smart and low carb plans recommended) @ www.eyyouc27.com, Metabolic Meals @ www.MyMetabolicMeals.com - individual prepared meals to go  Gobble, Blue Apron, Home , Every Plate, Sunbasket- on line meal delivery programs for preparation at home  Meal Village in [place] for homemade meals to go @ www.mealvillage.Wantful  Diet Doctor @ www.dietdoctor.com - low carb swaps  Yummev-social - meal prep and planning david (www.yummly.com)    Stress, Anxiety, Depression, Trauma  CALM meditation david (www.calm.com)  Headspace  Don't let anxiety run your life. Using the science of emotion regulation and mindfulness to overcome fear and worry by Ja Garland PsyD and Esteban Box MA.  The Maria Eugenia Villagomez  Podcast (September 27, 2023): 6 Magic Words That Stop Anxiety  What Happened to You?- a look at the impact trauma has on behavior written by Stephanie Mann and Dr. Slick Small  Whole Again by Maurizio Bridges - discovering your true self after trauma    Mindful Eating/The Hungry Brain  Am I Hungry? Mindful eating virtual  kiarra (www.amihungry.com)  The Hungry Brain by Kaitlin Dimas, PhD  Mindless Eating by Elroy Gardner  Weight Loss Surgery Will Not Treat Food Addiction by Racheal Ayoub Ph.D    Metabolic Dysfunction, Hormones and Cravings  Why We Get Sick? By Scotty Gomez (insulin resistance)  Your Body in Balance: The New Science of Food, Hormones, and Health by Dr. Adal Beckham  The Complete Guide to fasting by Dr. Meneses  Fast Like a Girl by Dr. Afsaneh Muñiz  The Menopause Reset by Dr. Afsaneh Muñiz  Sugar, Salt & Fat by Camelia Dickerson, Ph.D, R.D.  The Truth About Sugar - documentary on sugar (Free on WatrHub, https://Gemmyo.be/0C4lnluGL9w)  Reverse Visceral Fat: #1 Way to Increase Your Lifespan & End Inflammation with Dr. Adithya Felix on WatrHub @ https://Gemmyo.Better World Books/nupPRnvUpJY?si=sc5fisGpWMG0YqlO    Nutrition Plans  You Are What You Eat - Netfix series on twin study looking at impact of nutrition changes on health  The End of Dieting: How to Live for Life by Dr. Neptali Klein M.D. or listen to The LayerGloss Podcast Episode 63: Understanding \"Nutritarian\" Eating w/Dr. Neptali Klein  The Game Changers- Netflix Documentary on plant based nutrition  The Dr. Veronica T5 Wellness Plan by Dr. Beto Veronica MD  The Complete Guide to fasting by Dr. Meneses    Education, Motivation and Support Resources  Live to 100: Secrets of the Blue Zones - Netflix series on the secrets to communities living over 100 years old  Atomic Habits by Lion Wallace (a book about taking small steps to promote greater behavior change)   Motivation kiarra (black box with white \")- daily supportive messages sent to your phone  Can't Hurt Me by Ja  Jo Ann (a book exploring the power of discipline in achieving your goals)  Fed Up - documentary about obesity (Free on Utube)  Www.yourweightmatters.org - Obesity Action Coalition sponsored Blog posts  Obesity Action Coalition Resources on topics specific to weight management (www.obesityaction.org)  Fitlosophy Fitspiration - journal to better health (journal book found at [employer] in fitness aisle)  Bebeto Pinto talk titled: The Call to Courage (Netflix)  The Exam Room by the Physician's Committee (Podcast)  Nutrition Facts by Dr. Raymundo (Podcast)

## 2024-01-23 ENCOUNTER — TELEPHONE (OUTPATIENT)
Dept: INTERNAL MEDICINE CLINIC | Facility: CLINIC | Age: 59
End: 2024-01-23

## 2024-01-23 NOTE — TELEPHONE ENCOUNTER
PA entered in Epic for Zepbound 2.5 mg  Has tried qsymia  Cannot get saxenda and wegovy  Orlistat not recommended with GI issues     Awaiting decision.

## 2024-01-29 ENCOUNTER — E-ADVICE (OUTPATIENT)
Dept: CARDIOLOGY | Age: 59
End: 2024-01-29

## 2024-01-29 ENCOUNTER — TELEPHONE (OUTPATIENT)
Dept: CARDIOLOGY | Age: 59
End: 2024-01-29

## 2024-02-04 NOTE — TELEPHONE ENCOUNTER
Zepbound denied for not trying meds we listed as contraindication and shortage.  Must dispute  Print fax from 1/23/24  at 3:41

## 2024-02-05 NOTE — TELEPHONE ENCOUNTER
Reconsideration faxed to [facility]   Sent FDA notices  Qsymia printed and noted she has tried no benefit  Orlistat contraindicated d/t GI issues    Awaiting decision.

## 2024-02-09 ENCOUNTER — APPOINTMENT (OUTPATIENT)
Dept: CARDIOLOGY | Age: 59
End: 2024-02-09

## 2024-02-09 VITALS
WEIGHT: 203.81 LBS | OXYGEN SATURATION: 100 % | SYSTOLIC BLOOD PRESSURE: 118 MMHG | HEIGHT: 66 IN | HEART RATE: 84 BPM | DIASTOLIC BLOOD PRESSURE: 64 MMHG | RESPIRATION RATE: 18 BRPM | BODY MASS INDEX: 32.76 KG/M2

## 2024-02-09 DIAGNOSIS — Z51.81 ENCOUNTER FOR THERAPEUTIC DRUG MONITORING: ICD-10-CM

## 2024-02-09 DIAGNOSIS — R00.1 BRADYCARDIA: ICD-10-CM

## 2024-02-09 DIAGNOSIS — E78.5 HYPERLIPIDEMIA, UNSPECIFIED HYPERLIPIDEMIA TYPE: Primary | ICD-10-CM

## 2024-02-09 DIAGNOSIS — E66.9 CLASS 1 OBESITY WITH SERIOUS COMORBIDITY AND BODY MASS INDEX (BMI) OF 33.0 TO 33.9 IN ADULT, UNSPECIFIED OBESITY TYPE: ICD-10-CM

## 2024-02-09 DIAGNOSIS — R73.03 BORDERLINE DIABETES MELLITUS: ICD-10-CM

## 2024-02-09 DIAGNOSIS — Z86.39 HISTORY OF MORBID OBESITY: ICD-10-CM

## 2024-02-09 DIAGNOSIS — I10 HYPERTENSION, UNSPECIFIED TYPE: ICD-10-CM

## 2024-02-09 DIAGNOSIS — G47.33 SLEEP APNEA, OBSTRUCTIVE: ICD-10-CM

## 2024-02-09 RX ORDER — TIRZEPATIDE 2.5 MG/.5ML
2.5 INJECTION, SOLUTION SUBCUTANEOUS WEEKLY
Qty: 2 ML | Refills: 0 | Status: CANCELLED | OUTPATIENT
Start: 2024-02-09

## 2024-02-09 ASSESSMENT — PATIENT HEALTH QUESTIONNAIRE - PHQ9
2. FEELING DOWN, DEPRESSED OR HOPELESS: NOT AT ALL
1. LITTLE INTEREST OR PLEASURE IN DOING THINGS: NOT AT ALL
SUM OF ALL RESPONSES TO PHQ9 QUESTIONS 1 AND 2: 0
SUM OF ALL RESPONSES TO PHQ9 QUESTIONS 1 AND 2: 0
CLINICAL INTERPRETATION OF PHQ2 SCORE: NO FURTHER SCREENING NEEDED

## 2024-02-10 ENCOUNTER — LAB SERVICES (OUTPATIENT)
Dept: LAB | Age: 59
End: 2024-02-10

## 2024-02-10 DIAGNOSIS — E78.5 HYPERLIPIDEMIA, UNSPECIFIED HYPERLIPIDEMIA TYPE: ICD-10-CM

## 2024-02-10 DIAGNOSIS — R73.03 BORDERLINE DIABETES MELLITUS: ICD-10-CM

## 2024-02-10 DIAGNOSIS — R00.1 BRADYCARDIA: ICD-10-CM

## 2024-02-10 DIAGNOSIS — G47.33 SLEEP APNEA, OBSTRUCTIVE: ICD-10-CM

## 2024-02-10 DIAGNOSIS — E55.9 VITAMIN D DEFICIENCY: ICD-10-CM

## 2024-02-10 LAB
ALBUMIN SERPL-MCNC: 3.7 G/DL (ref 3.6–5.1)
ALBUMIN/GLOB SERPL: 1.1 {RATIO} (ref 1–2.4)
ALP SERPL-CCNC: 85 UNITS/L (ref 45–117)
ALT SERPL-CCNC: 21 UNITS/L
ANION GAP SERPL CALC-SCNC: 15 MMOL/L (ref 7–19)
AST SERPL-CCNC: 17 UNITS/L
BASOPHILS # BLD: 0.1 K/MCL (ref 0–0.3)
BASOPHILS NFR BLD: 1 %
BILIRUB SERPL-MCNC: 0.3 MG/DL (ref 0.2–1)
BUN SERPL-MCNC: 22 MG/DL (ref 6–20)
BUN/CREAT SERPL: 22 (ref 7–25)
CALCIUM SERPL-MCNC: 9.4 MG/DL (ref 8.4–10.2)
CHLORIDE SERPL-SCNC: 107 MMOL/L (ref 97–110)
CHOLEST SERPL-MCNC: 196 MG/DL
CHOLEST/HDLC SERPL: 2 {RATIO}
CO2 SERPL-SCNC: 26 MMOL/L (ref 21–32)
CREAT SERPL-MCNC: 0.98 MG/DL (ref 0.51–0.95)
DEPRECATED RDW RBC: 42.4 FL (ref 39–50)
EGFRCR SERPLBLD CKD-EPI 2021: 67 ML/MIN/{1.73_M2}
EOSINOPHIL # BLD: 0.2 K/MCL (ref 0–0.5)
EOSINOPHIL NFR BLD: 3 %
ERYTHROCYTE [DISTWIDTH] IN BLOOD: 12.3 % (ref 11–15)
FASTING DURATION TIME PATIENT: 12 HOURS (ref 0–999)
GLOBULIN SER-MCNC: 3.4 G/DL (ref 2–4)
GLUCOSE SERPL-MCNC: 89 MG/DL (ref 70–99)
HBA1C MFR BLD: 5.3 % (ref 4.5–5.6)
HCT VFR BLD CALC: 39.3 % (ref 36–46.5)
HDLC SERPL-MCNC: 97 MG/DL
HGB BLD-MCNC: 12.7 G/DL (ref 12–15.5)
IMM GRANULOCYTES # BLD AUTO: 0 K/MCL (ref 0–0.2)
IMM GRANULOCYTES # BLD: 0 %
LDLC SERPL CALC-MCNC: 92 MG/DL
LYMPHOCYTES # BLD: 2.9 K/MCL (ref 1–4)
LYMPHOCYTES NFR BLD: 45 %
MCH RBC QN AUTO: 30.2 PG (ref 26–34)
MCHC RBC AUTO-ENTMCNC: 32.3 G/DL (ref 32–36.5)
MCV RBC AUTO: 93.3 FL (ref 78–100)
MONOCYTES # BLD: 0.5 K/MCL (ref 0.3–0.9)
MONOCYTES NFR BLD: 7 %
NEUTROPHILS # BLD: 2.8 K/MCL (ref 1.8–7.7)
NEUTROPHILS NFR BLD: 44 %
NONHDLC SERPL-MCNC: 99 MG/DL
NRBC BLD MANUAL-RTO: 0 /100 WBC
PLATELET # BLD AUTO: 362 K/MCL (ref 140–450)
POTASSIUM SERPL-SCNC: 4.2 MMOL/L (ref 3.4–5.1)
PROT SERPL-MCNC: 7.1 G/DL (ref 6.4–8.2)
RBC # BLD: 4.21 MIL/MCL (ref 4–5.2)
SODIUM SERPL-SCNC: 144 MMOL/L (ref 135–145)
TRIGL SERPL-MCNC: 34 MG/DL
TSH SERPL-ACNC: 1.99 MCUNITS/ML (ref 0.35–5)
WBC # BLD: 6.3 K/MCL (ref 4.2–11)

## 2024-02-10 PROCEDURE — 36415 COLL VENOUS BLD VENIPUNCTURE: CPT | Performed by: INTERNAL MEDICINE

## 2024-02-10 PROCEDURE — 83036 HEMOGLOBIN GLYCOSYLATED A1C: CPT | Performed by: INTERNAL MEDICINE

## 2024-02-10 PROCEDURE — 80061 LIPID PANEL: CPT | Performed by: INTERNAL MEDICINE

## 2024-02-10 PROCEDURE — 82306 VITAMIN D 25 HYDROXY: CPT | Performed by: CLINICAL MEDICAL LABORATORY

## 2024-02-10 PROCEDURE — 80050 GENERAL HEALTH PANEL: CPT | Performed by: INTERNAL MEDICINE

## 2024-02-15 ENCOUNTER — APPOINTMENT (OUTPATIENT)
Dept: CARDIOLOGY | Age: 59
End: 2024-02-15
Attending: INTERNAL MEDICINE

## 2024-02-15 DIAGNOSIS — R00.1 BRADYCARDIA: ICD-10-CM

## 2024-02-15 DIAGNOSIS — G47.33 SLEEP APNEA, OBSTRUCTIVE: ICD-10-CM

## 2024-02-15 DIAGNOSIS — E78.5 HYPERLIPIDEMIA, UNSPECIFIED HYPERLIPIDEMIA TYPE: ICD-10-CM

## 2024-02-15 DIAGNOSIS — R73.03 BORDERLINE DIABETES MELLITUS: ICD-10-CM

## 2024-02-15 LAB
AORTIC VALVE AREA (AVA): 0.57
AORTIC VALVE AREA: 2.63
ASCENDING AORTA (AAD): 3
AV MEAN GRADIENT (AVMG): 2.8
AV MEAN VELOCITY (AVMV): 0.77
AV PEAK GRADIENT (AVPG): 4.7
AV PEAK VELOCITY (AVPV): 1.09
AV STENOSIS SEVERITY TEXT: NORMAL
AVI LVOT PEAK GRADIENT (LVOTMG): 1.2
E WAVE DECELARATION TIME (MDT): 6.4
INTERVENTRICULAR SEPTUM IN END DIASTOLE (IVSD): 2.42
LEFT INTERNAL DIMENSION IN SYSTOLE (LVSD): 1.1
LEFT VENTRICULAR INTERNAL DIMENSION IN DIASTOLE (LVDD): 2.6
LEFT VENTRICULAR POSTERIOR WALL IN END DIASTOLE (LVPW): 4.1
LV EF: NORMAL %
LVOT 2D (LVOTD): 14.8
LVOT VTI (LVOTVTI): 0.75
MV E TISSUE VEL MED (MESV): 13.2
MV E WAVE VEL/E TISSUE VEL MED(MSR): 8.92
MV PEAK A VELOCITY (MVPAV): 380
MV PEAK E VELOCITY (MVPEV): 0.64
RV END SYSTOLIC LONGITUDINAL STRAIN FREE WALL (RVGS): 2.2
TRICUSPID VALVE PEAK REGURGITATION VELOCITY (TRPV): 3.3
TV ESTIMATED RIGHT ARTERIAL PRESSURE (RAP): 12.4

## 2024-02-15 PROCEDURE — 93306 TTE W/DOPPLER COMPLETE: CPT | Performed by: INTERNAL MEDICINE

## 2024-02-16 ENCOUNTER — E-ADVICE (OUTPATIENT)
Dept: FAMILY MEDICINE | Age: 59
End: 2024-02-16

## 2024-02-16 ENCOUNTER — APPOINTMENT (OUTPATIENT)
Dept: INTERNAL MEDICINE | Age: 59
End: 2024-02-16

## 2024-02-16 VITALS
HEART RATE: 63 BPM | WEIGHT: 202 LBS | TEMPERATURE: 97.8 F | OXYGEN SATURATION: 100 % | BODY MASS INDEX: 32.47 KG/M2 | DIASTOLIC BLOOD PRESSURE: 74 MMHG | RESPIRATION RATE: 16 BRPM | HEIGHT: 66 IN | SYSTOLIC BLOOD PRESSURE: 118 MMHG

## 2024-02-16 DIAGNOSIS — J45.20 MILD INTERMITTENT ASTHMA WITHOUT COMPLICATION: ICD-10-CM

## 2024-02-16 DIAGNOSIS — I10 ESSENTIAL HYPERTENSION: ICD-10-CM

## 2024-02-16 DIAGNOSIS — R92.8 ABNORMAL MAMMOGRAM: ICD-10-CM

## 2024-02-16 DIAGNOSIS — K22.719 BARRETT'S ESOPHAGUS WITH DYSPLASIA: ICD-10-CM

## 2024-02-16 DIAGNOSIS — Z00.00 ANNUAL PHYSICAL EXAM: Primary | ICD-10-CM

## 2024-02-16 RX ORDER — ROSUVASTATIN CALCIUM 10 MG/1
10 TABLET, COATED ORAL NIGHTLY
Qty: 90 TABLET | Refills: 3 | Status: SHIPPED | OUTPATIENT
Start: 2024-02-16

## 2024-02-16 RX ORDER — LISINOPRIL 20 MG/1
20 TABLET ORAL DAILY
Qty: 90 TABLET | Refills: 1 | Status: SHIPPED | OUTPATIENT
Start: 2024-02-16 | End: 2024-02-16 | Stop reason: DRUGHIGH

## 2024-02-16 RX ORDER — TIRZEPATIDE 2.5 MG/.5ML
2.5 INJECTION, SOLUTION SUBCUTANEOUS
COMMUNITY
Start: 2024-01-22

## 2024-02-16 RX ORDER — LISINOPRIL 10 MG/1
10 TABLET ORAL DAILY
Qty: 90 TABLET | Refills: 1 | Status: SHIPPED | OUTPATIENT
Start: 2024-02-16 | End: 2025-02-15

## 2024-02-16 RX ORDER — PANTOPRAZOLE SODIUM 40 MG/1
40 TABLET, DELAYED RELEASE ORAL DAILY
Qty: 90 TABLET | Refills: 1 | Status: SHIPPED | OUTPATIENT
Start: 2024-02-16

## 2024-02-16 SDOH — HEALTH STABILITY: PHYSICAL HEALTH: ON AVERAGE, HOW MANY MINUTES DO YOU ENGAGE IN EXERCISE AT THIS LEVEL?: 30 MIN

## 2024-02-16 SDOH — HEALTH STABILITY: PHYSICAL HEALTH: ON AVERAGE, HOW MANY DAYS PER WEEK DO YOU ENGAGE IN MODERATE TO STRENUOUS EXERCISE (LIKE A BRISK WALK)?: 2 DAYS

## 2024-02-16 ASSESSMENT — PATIENT HEALTH QUESTIONNAIRE - PHQ9
SUM OF ALL RESPONSES TO PHQ9 QUESTIONS 1 AND 2: 0
SUM OF ALL RESPONSES TO PHQ9 QUESTIONS 1 AND 2: 0
2. FEELING DOWN, DEPRESSED OR HOPELESS: NOT AT ALL
1. LITTLE INTEREST OR PLEASURE IN DOING THINGS: NOT AT ALL
CLINICAL INTERPRETATION OF PHQ2 SCORE: NO FURTHER SCREENING NEEDED

## 2024-02-17 LAB
25(OH)D2 SERPL-MCNC: <1 NG/ML
25(OH)D3 SERPL-MCNC: 56.6 NG/ML
25(OH)D3+25(OH)D2 SERPL-MCNC: 56.6 NG/ML (ref 30–80)

## 2024-02-18 DIAGNOSIS — G43.009 MIGRAINE WITHOUT AURA AND WITHOUT STATUS MIGRAINOSUS, NOT INTRACTABLE: ICD-10-CM

## 2024-02-19 RX ORDER — BUTALBITAL, ACETAMINOPHEN AND CAFFEINE 50; 325; 40 MG/1; MG/1; MG/1
1 TABLET ORAL EVERY 6 HOURS PRN
Qty: 30 TABLET | Refills: 0 | Status: SHIPPED | OUTPATIENT
Start: 2024-02-19

## 2024-02-27 ENCOUNTER — IMAGING SERVICES (OUTPATIENT)
Dept: MAMMOGRAPHY | Age: 59
End: 2024-02-27
Attending: INTERNAL MEDICINE

## 2024-02-27 ENCOUNTER — APPOINTMENT (OUTPATIENT)
Dept: CARDIOLOGY | Age: 59
End: 2024-02-27

## 2024-02-27 DIAGNOSIS — R92.8 ABNORMAL MAMMOGRAM: ICD-10-CM

## 2024-02-27 PROCEDURE — 77066 DX MAMMO INCL CAD BI: CPT | Performed by: RADIOLOGY

## 2024-02-28 DIAGNOSIS — Z86.39 HISTORY OF MORBID OBESITY: ICD-10-CM

## 2024-02-28 DIAGNOSIS — Z51.81 ENCOUNTER FOR THERAPEUTIC DRUG MONITORING: ICD-10-CM

## 2024-02-28 DIAGNOSIS — E66.9 CLASS 1 OBESITY WITH SERIOUS COMORBIDITY AND BODY MASS INDEX (BMI) OF 33.0 TO 33.9 IN ADULT, UNSPECIFIED OBESITY TYPE: ICD-10-CM

## 2024-02-28 DIAGNOSIS — F43.9 STRESS: ICD-10-CM

## 2024-02-29 RX ORDER — FLUOXETINE HYDROCHLORIDE 20 MG/1
20 CAPSULE ORAL DAILY
Qty: 90 CAPSULE | Refills: 1 | OUTPATIENT
Start: 2024-02-29

## 2024-02-29 NOTE — TELEPHONE ENCOUNTER
Requesting   Requested Prescriptions     Pending Prescriptions Disp Refills    FLUOXETINE 20 MG Oral Cap [Pharmacy Med Name: FLUOXETINE HCL 20 MG CAPSULE] 90 capsule 1     Sig: TAKE 1 CAPSULE BY MOUTH EVERY DAY     LOV: 1/22/24  RTC: 2 months  Filled: 1/22/24 #180 with 1 refills    Future Appointments   Date Time Provider Department Center   3/28/2024  8:40 AM Daysi Mendez APRN [facility]   9/4/2024  9:00 AM Daysi Mendez APRN [facility]   3/5/2025  9:00 AM Daysi Mendez APRN [facility]     Refill too soon

## 2024-03-28 ENCOUNTER — OFFICE VISIT (OUTPATIENT)
Dept: INTERNAL MEDICINE CLINIC | Facility: CLINIC | Age: 59
End: 2024-03-28
Payer: COMMERCIAL

## 2024-03-28 VITALS
SYSTOLIC BLOOD PRESSURE: 110 MMHG | HEART RATE: 55 BPM | RESPIRATION RATE: 16 BRPM | DIASTOLIC BLOOD PRESSURE: 72 MMHG | BODY MASS INDEX: 32.49 KG/M2 | HEIGHT: 65 IN | WEIGHT: 195 LBS

## 2024-03-28 DIAGNOSIS — E66.9 CLASS 1 OBESITY WITH SERIOUS COMORBIDITY AND BODY MASS INDEX (BMI) OF 33.0 TO 33.9 IN ADULT, UNSPECIFIED OBESITY TYPE: ICD-10-CM

## 2024-03-28 DIAGNOSIS — Z86.39 HISTORY OF MORBID OBESITY: ICD-10-CM

## 2024-03-28 DIAGNOSIS — K21.9 GASTROESOPHAGEAL REFLUX DISEASE, UNSPECIFIED WHETHER ESOPHAGITIS PRESENT: ICD-10-CM

## 2024-03-28 DIAGNOSIS — E78.00 HYPERCHOLESTEROLEMIA: ICD-10-CM

## 2024-03-28 DIAGNOSIS — I10 ESSENTIAL HYPERTENSION: ICD-10-CM

## 2024-03-28 DIAGNOSIS — Z90.49 HISTORY OF CHOLECYSTECTOMY: ICD-10-CM

## 2024-03-28 DIAGNOSIS — Z90.3 HISTORY OF SLEEVE GASTRECTOMY: ICD-10-CM

## 2024-03-28 DIAGNOSIS — R63.8 CRAVING FOR PARTICULAR FOOD: ICD-10-CM

## 2024-03-28 DIAGNOSIS — Z51.81 ENCOUNTER FOR THERAPEUTIC DRUG MONITORING: Primary | ICD-10-CM

## 2024-03-28 PROCEDURE — 99214 OFFICE O/P EST MOD 30 MIN: CPT | Performed by: NURSE PRACTITIONER

## 2024-03-28 PROCEDURE — 3008F BODY MASS INDEX DOCD: CPT | Performed by: NURSE PRACTITIONER

## 2024-03-28 PROCEDURE — 3074F SYST BP LT 130 MM HG: CPT | Performed by: NURSE PRACTITIONER

## 2024-03-28 PROCEDURE — 3078F DIAST BP <80 MM HG: CPT | Performed by: NURSE PRACTITIONER

## 2024-03-28 RX ORDER — TIRZEPATIDE 7.5 MG/.5ML
7.5 INJECTION, SOLUTION SUBCUTANEOUS WEEKLY
Qty: 2 ML | Refills: 2 | Status: SHIPPED | OUTPATIENT
Start: 2024-03-28

## 2024-03-28 RX ORDER — PANCRELIPASE LIPASE, PANCRELIPASE PROTEASE, PANCRELIPASE AMYLASE 252600; 60000; 189600 [USP'U]/1; [USP'U]/1; [USP'U]/1
CAPSULE, DELAYED RELEASE ORAL
Qty: 600 CAPSULE | Refills: 1 | Status: SHIPPED | OUTPATIENT
Start: 2024-03-28

## 2024-03-28 RX ORDER — NALTREXONE HYDROCHLORIDE 50 MG/1
50 TABLET, FILM COATED ORAL
Qty: 90 TABLET | Refills: 1 | Status: SHIPPED | OUTPATIENT
Start: 2024-03-28

## 2024-03-28 RX ORDER — CETIRIZINE HYDROCHLORIDE 10 MG/1
1 TABLET ORAL AS DIRECTED
COMMUNITY
Start: 2022-02-02

## 2024-03-28 RX ORDER — TIRZEPATIDE 5 MG/.5ML
5 INJECTION, SOLUTION SUBCUTANEOUS WEEKLY
Qty: 2 ML | Refills: 0 | Status: SHIPPED | OUTPATIENT
Start: 2024-03-28

## 2024-03-28 RX ORDER — ROSUVASTATIN CALCIUM 10 MG/1
TABLET, COATED ORAL
COMMUNITY
Start: 2024-03-17

## 2024-03-28 RX ORDER — BUTALBITAL, ACETAMINOPHEN AND CAFFEINE 50; 325; 40 MG/1; MG/1; MG/1
1 TABLET ORAL EVERY 6 HOURS PRN
COMMUNITY
Start: 2024-02-19

## 2024-03-28 RX ORDER — LISINOPRIL 10 MG/1
10 TABLET ORAL DAILY
COMMUNITY
Start: 2024-02-16

## 2024-03-28 NOTE — PROGRESS NOTES
Sindy Torres is a 58 year old female presents today for follow-up on medical weight loss program for the treatment of overweight, obesity, or morbid obesity with hx of VSG in 5/2021 and associated HTN, OA, GERD.    S:  Current weight   Wt Readings from Last 6 Encounters:   03/28/24 195 lb (88.5 kg)   01/22/24 203 lb (92.1 kg)   03/01/23 202 lb (91.6 kg)   12/13/22 206 lb (93.4 kg)   11/30/22 210 lb (95.3 kg)   09/29/22 212 lb (96.2 kg)    AND BMI Body mass index is 32.45 kg/m²..    Patient has lost 8# since LOV 2 months ago. She has been consistent with medication and tolerating Zepbound without SEs.    Testing/consult completed since LOV: Dietician: Estimated caloric needs for weight loss: 1550 cals/d for 2 pounds/week weight loss.    [facility] Medical Weight Loss Follow Up    Question 3/27/2024 10:21 PM CDT - Filed by Patient   Please describe a success moment: Lost 8 pounds   Please describe a challenging moment/needs for improvement: Eating enough food for weight maintenance   Please complete this 24 hour food journal, listing everything you had to eat in the past day. Include the average time of day you ate these meals at    List foods, qty and prep for breakfast: Life green shake.   List foods, qty and prep for lunch. Protein drink   List foods, qty and prep for dinner. Fish or chicken   List foods, qty and prep for snacks. Fruit ( mainly berrys) apples, pineapples   List the types and qty of fluids consumed Water, hot tea   On average, how many meals did you eat out per week? 2   Exercise    How many days per week are you active or exercise 2   On average, how many days were anaerobic (strength/resistance) exercises performed? 1   On average, how many days were aerobic (cardio) exercises performed? 1   Perceived level of exertion on a scale of 1-5, with 5 being very intense: 3   Stress    Average stress level on a scale of 1-10, with 10 being extremely stressed: 6   If greater than 5/1O how would you grade your  coping mechanisms? fair   Sleep hours and integrity    How many hours of uninterrupted sleep do you get a night: 5   Do you feel rested in the morning: Yes   If no, what may have been disrupting your sleep?    Please list any goal(s) for your next visit Weight lost and maintenance     Social hx and PMH reviewed. Employed from home full time.  with 3 kids, 1 set of twins. Good spousal support.    REVIEW OF SYSTEMS:  GENERAL: feels well otherwise  LUNGS: denies shortness of breath with exertion  CARDIOVASCULAR: denies chest pain on exertion, denies palpitations or pedal edema  GI: denies abdominal pain.  No N/V/D/C. GERD controlled on PPI  MUSCULOSKELETAL: no acute joint or muscle pain  NEURO: denies headaches or dizziness. Migraines stable.  PSYCH: denies change in behavior or mood, denies feeling sad or depressed. Answers submitted by the patient for this visit:  Medical Weight Loss Follow Up (Submitted on 1/18/2024)  If greater than 5/1O how would you grade your coping mechanisms?: moderate      EXAM:  /72   Pulse 55   Resp 16   Ht 5' 5\" (1.651 m)   Wt 195 lb (88.5 kg)   LMP  (LMP Unknown)   BMI 32.45 kg/m²   GENERAL: well developed, well nourished, in no apparent distress, obese  EYES: conjunctiva pink, sclera non icteric, PERRLA  LUNGS: CTA in all fields, breathing non labored  CARDIO: regular rhythm without murmur, normal S1 and S2 without clicks or gallops, no pedal edema.  GI: +BS  NEURO/MS: motor and sensory grossly intact  PSYCH: pleasant, cooperative, normal mood and affect    ASSESSMENT AND PLAN:  Encounter Diagnoses   Name Primary?    Encounter for therapeutic drug monitoring Yes    Class 1 obesity with serious comorbidity and body mass index (BMI) of 33.0 to 33.9 in adult, unspecified obesity type     Hypercholesterolemia     Essential hypertension     History of sleeve gastrectomy     History of morbid obesity     History of cholecystectomy     Gastroesophageal reflux disease,  unspecified whether esophagitis present     Craving for particular food          No orders of the defined types were placed in this encounter.      Meds & Refills for this Visit:  Requested Prescriptions     Signed Prescriptions Disp Refills    Tirzepatide-Weight Management (ZEPBOUND) 5 MG/0.5ML Subcutaneous Solution Auto-injector 2 mL 0     Sig: Inject 5 mg into the skin once a week.    Tirzepatide-Weight Management (ZEPBOUND) 7.5 MG/0.5ML Subcutaneous Solution Auto-injector 2 mL 2     Sig: Inject 7.5 mg into the skin once a week. Start after completing full 4 weeks on 5 mg weekly dose.    Pancrelipase, Lip-Prot-Amyl, (ZENPEP) 13282-078607 units Oral Cap DR Particles 600 capsule 1     Sig: Take 1 capsule by mouth 3 (three) times daily with meals. May also take 1 capsule 2 (two) times daily as needed (with snack).    naltrexone 50 MG Oral Tab 90 tablet 1     Sig: Take 1 tablet (50 mg total) by mouth daily with food. For cravings       Imaging & Consults:  None      Plan:  Patient has lost 8# since LOV 2 months ago on Naltrexone 50 mg daily, fluoxetine 40 mg daily, Zepbound 5 mg weekly with a total weight loss of 72# since initial consult on 5/21/2019 with initial weight of 267#.  Weight loss goal:  lose 40# in 6 months, 75# in 1 year. BP controlled. CPM, aside from increase Zepbound after next month of 5 mg weekly. Hx of Qsymia.  on fitness. See patient instructions below for additional plans and patient counseling.    Patient Instructions   Continue making lifestyle changes that focus on good nutrition, regular exercise and stress management.    Medication Plan: Continue current medication regimen aside from increase Zepbound to 7.5 mg weekly after 1 more month on 5 mg weekly. I refilled Zenpep to [facility] in [place] for cost savings. They can deliver if needed. If cost savings not significant and you would rather have prescription transferred back to local pharmacy please let me know.    Next  steps to work on before next office visit include: Building a balanced fitness routine is recommend to maintain physical function for life!    Weight Management: Exercise and Activity    Studies show that people who exercise are the most likely to lose weight and keep it off. Exercise burns calories. It helps build muscle to make your body stronger. Make exercise an important part of your weight-management plan.  Make activity part of your day  You may not think you have the time to exercise. But you can work activity into your daily life--you just need to be committed. Take 10 minutes out of your lunch hour to take a walk. Walk to the [employer] to get your paper instead of having it delivered. Make it a habit to take the stairs instead of the elevator. Park in a far away parking spot instead of the closest. You’ll be surprised at how fast these little changes can make a difference.  Some people really cannot walk very far, and tire out quickly with exercise. Instead of becoming discouraged, resolve to do what you can do, and work to make that a regular frequent habit.   The benefits of exercise  Exercise offers many benefits including:   Exercise increases your metabolism (the speed at which your body burns calories).  Regular exercise can increase the amount of muscle in your body. Muscle burns calories faster than fat. The more muscle you have, the more calories you burn.  Exercise gives you energy and curbs your appetite.  Exercise decreases stress and helps you sleep better.  Make exercise fun  Exercise can be fun. Choose an activity you enjoy. You may even get a friend to do it with you:  Take a resistance-training or aerobics class  Join a team sport  Take a dance class  Walk the dog  Ride a bike  If you have health problems, be sure to ask your healthcare provider before you start an exercise program. Have a  help you develop a plan that’s safe for you.   Date Last Reviewed: 2/4/2016  ©  9089-6528 [employer]. [address] [place] [address], [place] [address]. All rights reserved. This information is not intended as a substitute for professional medical care. Always follow your healthcare professional's instructions.      Developing a balanced fitness routine takes time. Begin to build the mentality of fitness 4 function! Start gradually and safely to continue to lose and maintain weight loss, build strength and prevent injury. Fitness not only supports a healthy body, but also a healthy mind with reducing stress and lifting your mood. I recommend a routine of 3x/week of cardio with varying intensity, 3x/week of strength training and 1x/week of stretching/flexibility/balance- such as Yoga.  Three ingredients necessary for making a habit of exercise for a lifetime includes: convenience, budget friendly and most importantly FUN! Changing up your exercise routine seasonally can keep you motivated and expose you to new interests and challenges! Step outside your comfort zone and give it a try, you never know where the challenge will lead you and what changes you may see!    Learning to Apply the FITT Principle to Your Exercise Plan  One of the biggest challenges with exercise is knowing where to start and how to get better. To improve your fitness, you must self-monitor your workouts and make changes when necessary. One of the best tools you can use to help you is the FITT Principle.  FITT is an acronym that outlines the basic components of a successful exercise plan.  Frequency - How often you exercise  Intensity - How hard you exercise  Time - How long you exercise  Type - What kind of exercise you do  How Often Should You Exercise?  It is a myth that you must work out for extended periods every day to lose weight and keep it off. What is considered an “effective” exercise varies between people. Factors that affect this include age, fitness level, mobility, health conditions, etc.  Before you  begin an exercise plan and decide how often to exercise, consider these key factors.  What is your current fitness level? What are you able to do?  What is your schedule? How much time do you have to exercise?  What health and fitness goals do you want to achieve?  Build your plan off of the answers to these questions. If you are a busy mom and you want to lose weight to gain more energy, you might not have a lot of time. Maybe your only form of exercise is keeping up with your kids. In this case, you may want to start small. For example, you could plan workouts for weekend afternoons when your spouse can watch the kids.  How Hard Should You Exercise?  The best way to see how hard you are working is to monitor your heart rate. You can do this by wearing a fitness tracker, heart rate monitor or smart watch. You can also feel for your heartbeat and count it over a 15-second period.  Low-intensity - An activity level you can continue for a long time (walking)  Moderate-intensity - An activity level that will boost your heart rate and require effort to maintain (biking)  High-intensity - An activity level that feels like an all-out effort. Your heart rate is high and you can't speak complete sentences between breaths  How Long Should You Exercise?  The time you spend exercising will usually depend on what you are doing. Health experts recommend at least 30 minutes of cardio exercise each workout. However, if you are doing a strength-based exercise, you will likely pay more attention to your number of “sets” and “reps.” Regardless, many other factors are involved.  The amount of time you have  How long it takes you to feel fatigued  Weather, time of day  Health conditions  What Should You Do for Exercise?  Should you hit up the elliptical at the gym? Should you go for a hike? The type of exercise you do depends on what you like and what results you want.  For example, if you want to improve your cardio-vascular fitness  and you love the outdoors, try exercises like hiking, swimming and biking. If you want to improve your muscle strength and you enjoy the convenience of the gym, try using free weights or machine weights. You can also use your body weight for exercises like push-ups, chin-ups, planks, etc.  The FITT Principle: Final Considerations  You can use the FITT Principle for cardio exercise, strength-based exercise, stretching and more. However, before you start any exercise plan, first consult with your healthcare provider. They will help you develop a plan that is safe and effective. By using the FITT Principle, you can not only improve your fitness level with time, but you can also prevent serious injury.      Medication use and SEs reviewed with patient.    Return in about 4 months (around 7/28/2024) for weight management via clinic or VV.    Patient verbalizes understanding.    DOCUMENTATION OF TIME SPENT: Code selection for this visit was based on time spent : 30 minutes on date of service in preparing to see the patient, obtaining and/or reviewing separately obtained history, performing a medically appropriate examination, counseling and educating the patient/family/caregiver, ordering medications or testing, referring and communicating with other healthcare providers, documenting clinical information in the electronic medical record, independently interpreting results and communicating results to the patient/family/caregiver and care coordination with the patient's other providers.    Answers submitted by the patient for this visit:  Medical Weight Loss Follow Up (Submitted on 3/27/2024)  If greater than 5/1O how would you grade your coping mechanisms?: fair

## 2024-03-29 NOTE — PATIENT INSTRUCTIONS
Continue making lifestyle changes that focus on good nutrition, regular exercise and stress management.    Medication Plan: Continue current medication regimen aside from increase Zepbound to 7.5 mg weekly after 1 more month on 5 mg weekly. I refilled Zenpep to [facility] in [place] for cost savings. They can deliver if needed. If cost savings not significant and you would rather have prescription transferred back to local pharmacy please let me know.    Next steps to work on before next office visit include: Building a balanced fitness routine is recommend to maintain physical function for life!    Weight Management: Exercise and Activity    Studies show that people who exercise are the most likely to lose weight and keep it off. Exercise burns calories. It helps build muscle to make your body stronger. Make exercise an important part of your weight-management plan.  Make activity part of your day  You may not think you have the time to exercise. But you can work activity into your daily life--you just need to be committed. Take 10 minutes out of your lunch hour to take a walk. Walk to the [employer] to get your paper instead of having it delivered. Make it a habit to take the stairs instead of the elevator. Park in a far away parking spot instead of the closest. You’ll be surprised at how fast these little changes can make a difference.  Some people really cannot walk very far, and tire out quickly with exercise. Instead of becoming discouraged, resolve to do what you can do, and work to make that a regular frequent habit.   The benefits of exercise  Exercise offers many benefits including:   Exercise increases your metabolism (the speed at which your body burns calories).  Regular exercise can increase the amount of muscle in your body. Muscle burns calories faster than fat. The more muscle you have, the more calories you burn.  Exercise gives you energy and curbs your appetite.  Exercise decreases stress  and helps you sleep better.  Make exercise fun  Exercise can be fun. Choose an activity you enjoy. You may even get a friend to do it with you:  Take a resistance-training or aerobics class  Join a team sport  Take a dance class  Walk the dog  Ride a bike  If you have health problems, be sure to ask your healthcare provider before you start an exercise program. Have a  help you develop a plan that’s safe for you.   Date Last Reviewed: 2/4/2016 © 2000-2017 [employer]. [address] [place] [id number]. All rights reserved. This information is not intended as a substitute for professional medical care. Always follow your healthcare professional's instructions.      Developing a balanced fitness routine takes time. Begin to build the mentality of fitness 4 function! Start gradually and safely to continue to lose and maintain weight loss, build strength and prevent injury. Fitness not only supports a healthy body, but also a healthy mind with reducing stress and lifting your mood. I recommend a routine of 3x/week of cardio with varying intensity, 3x/week of strength training and 1x/week of stretching/flexibility/balance- such as Yoga.  Three ingredients necessary for making a habit of exercise for a lifetime includes: convenience, budget friendly and most importantly FUN! Changing up your exercise routine seasonally can keep you motivated and expose you to new interests and challenges! Step outside your comfort zone and give it a try, you never know where the challenge will lead you and what changes you may see!    Learning to Apply the FITT Principle to Your Exercise Plan  One of the biggest challenges with exercise is knowing where to start and how to get better. To improve your fitness, you must self-monitor your workouts and make changes when necessary. One of the best tools you can use to help you is the FITT Principle.  FITT is an acronym that outlines the basic  components of a successful exercise plan.  Frequency - How often you exercise  Intensity - How hard you exercise  Time - How long you exercise  Type - What kind of exercise you do  How Often Should You Exercise?  It is a myth that you must work out for extended periods every day to lose weight and keep it off. What is considered an “effective” exercise varies between people. Factors that affect this include age, fitness level, mobility, health conditions, etc.  Before you begin an exercise plan and decide how often to exercise, consider these key factors.  What is your current fitness level? What are you able to do?  What is your schedule? How much time do you have to exercise?  What health and fitness goals do you want to achieve?  Build your plan off of the answers to these questions. If you are a busy mom and you want to lose weight to gain more energy, you might not have a lot of time. Maybe your only form of exercise is keeping up with your kids. In this case, you may want to start small. For example, you could plan workouts for weekend afternoons when your spouse can watch the kids.  How Hard Should You Exercise?  The best way to see how hard you are working is to monitor your heart rate. You can do this by wearing a fitness tracker, heart rate monitor or smart watch. You can also feel for your heartbeat and count it over a 15-second period.  Low-intensity - An activity level you can continue for a long time (walking)  Moderate-intensity - An activity level that will boost your heart rate and require effort to maintain (biking)  High-intensity - An activity level that feels like an all-out effort. Your heart rate is high and you can't speak complete sentences between breaths  How Long Should You Exercise?  The time you spend exercising will usually depend on what you are doing. Health experts recommend at least 30 minutes of cardio exercise each workout. However, if you are doing a strength-based exercise, you  will likely pay more attention to your number of “sets” and “reps.” Regardless, many other factors are involved.  The amount of time you have  How long it takes you to feel fatigued  Weather, time of day  Health conditions  What Should You Do for Exercise?  Should you hit up the elliptical at the gym? Should you go for a hike? The type of exercise you do depends on what you like and what results you want.  For example, if you want to improve your cardio-vascular fitness and you love the outdoors, try exercises like hiking, swimming and biking. If you want to improve your muscle strength and you enjoy the convenience of the gym, try using free weights or machine weights. You can also use your body weight for exercises like push-ups, chin-ups, planks, etc.  The FITT Principle: Final Considerations  You can use the FITT Principle for cardio exercise, strength-based exercise, stretching and more. However, before you start any exercise plan, first consult with your healthcare provider. They will help you develop a plan that is safe and effective. By using the FITT Principle, you can not only improve your fitness level with time, but you can also prevent serious injury.

## 2024-04-23 ENCOUNTER — PATIENT MESSAGE (OUTPATIENT)
Dept: INTERNAL MEDICINE CLINIC | Facility: CLINIC | Age: 59
End: 2024-04-23

## 2024-04-23 NOTE — TELEPHONE ENCOUNTER
Pt states she cannot find 5mg or 7.5mg dose. She can find 2.5mg. Would you like to decrease?     If yes, she wants 2.5mg to go to [facility].

## 2024-04-23 NOTE — TELEPHONE ENCOUNTER
From: Sindy Torres  To: Daysi Mendez  Sent: 4/23/2024 7:34 AM CDT  Subject: Zepbound shortage    Good morning. Wanted to reach out to state that I have not been able to get Zepbound meds due to nationwide shortage. Missed my first dose on Sunday since on the injections. What should I do? Any supplements you can offer?

## 2024-04-26 RX ORDER — TIRZEPATIDE 2.5 MG/.5ML
2.5 INJECTION, SOLUTION SUBCUTANEOUS WEEKLY
Qty: 2 ML | Refills: 1 | Status: SHIPPED | OUTPATIENT
Start: 2024-04-26

## 2024-05-06 ENCOUNTER — PATIENT MESSAGE (OUTPATIENT)
Dept: INTERNAL MEDICINE CLINIC | Facility: CLINIC | Age: 59
End: 2024-05-06

## 2024-05-06 DIAGNOSIS — E66.9 CLASS 1 OBESITY WITH SERIOUS COMORBIDITY AND BODY MASS INDEX (BMI) OF 33.0 TO 33.9 IN ADULT, UNSPECIFIED OBESITY TYPE: ICD-10-CM

## 2024-05-06 DIAGNOSIS — E78.00 HYPERCHOLESTEROLEMIA: ICD-10-CM

## 2024-05-06 DIAGNOSIS — Z51.81 ENCOUNTER FOR THERAPEUTIC DRUG MONITORING: Primary | ICD-10-CM

## 2024-05-07 NOTE — TELEPHONE ENCOUNTER
Last visit 3/28/24  To f/u in 4 months  Noted:   Return in about 4 months (around 7/28/2024) for weight management via clinic or VV.  Continue making lifestyle changes that focus on good nutrition, regular exercise and stress management.     Medication Plan: Continue current medication regimen aside from increase Zepbound to 7.5 mg weekly after 1 more month on 5 mg weekly. I refilled Zenpep to [facility] in [place] for cost savings. They can deliver if needed. If cost savings not significant and you would rather have prescription transferred back to local pharmacy please let me know.             Future Appointments   Date Time Provider Department Center   7/25/2024  8:40 AM Daysi Mendez APRN [facility]   9/4/2024  9:00 AM Daysi Mendez APRN [facility]   3/5/2025  9:00 AM Daysi Mendez APRN [facility]

## 2024-05-07 NOTE — TELEPHONE ENCOUNTER
From: Sindy Torres  To: Daysi Mendez  Sent: 5/6/2024 11:29 AM CDT  Subject: Zepbound    Can you request the Zepbound 10.0 at my [facility] pharmacy on [address] [place] [address]? They currently have that in stock now.     Sindy

## 2024-05-07 NOTE — TELEPHONE ENCOUNTER
Patient has already repeated zepbound 2.5 mg once  Cannot progress with shortages.  Asking about alternative please.  Seen 3/28/24 to f/u in 4 months

## 2024-05-11 RX ORDER — SEMAGLUTIDE 0.5 MG/.5ML
0.5 INJECTION, SOLUTION SUBCUTANEOUS WEEKLY
Qty: 2 ML | Refills: 0 | Status: SHIPPED | OUTPATIENT
Start: 2024-05-11 | End: 2024-05-13

## 2024-05-13 ENCOUNTER — TELEPHONE (OUTPATIENT)
Dept: INTERNAL MEDICINE CLINIC | Facility: CLINIC | Age: 59
End: 2024-05-13

## 2024-05-13 RX ORDER — SEMAGLUTIDE 0.5 MG/.5ML
0.5 INJECTION, SOLUTION SUBCUTANEOUS WEEKLY
Qty: 2 ML | Refills: 0 | Status: SHIPPED | OUTPATIENT
Start: 2024-05-13

## 2024-05-14 RX ORDER — LISINOPRIL 10 MG/1
10 TABLET ORAL DAILY
Qty: 90 TABLET | Refills: 1 | OUTPATIENT
Start: 2024-05-14

## 2024-05-20 NOTE — TELEPHONE ENCOUNTER
Appointment audit/chart review phone call per protocol. Per records, patient has appointments as follows:  Surgeon 08/17/2021  Dietician 08/04/2021  [id number] [facility] 07/07/2021  Requested a call back to inform office if pt has been hospitalized or visited any ED since surgery.
Quality 130: Documentation Of Current Medications In The Medical Record: Current Medications Documented
Quality 431: Preventive Care And Screening: Unhealthy Alcohol Use - Screening: Patient not identified as an unhealthy alcohol user when screened for unhealthy alcohol use using a systematic screening method
Name And Contact Information For Health Care Proxy: Lenin das [phone]
Quality 226: Preventive Care And Screening: Tobacco Use: Screening And Cessation Intervention: Patient screened for tobacco use and is an ex/non-smoker
Detail Level: Detailed

## 2024-06-17 DIAGNOSIS — G43.009 MIGRAINE WITHOUT AURA AND WITHOUT STATUS MIGRAINOSUS, NOT INTRACTABLE: ICD-10-CM

## 2024-06-17 RX ORDER — BUTALBITAL, ACETAMINOPHEN AND CAFFEINE 50; 325; 40 MG/1; MG/1; MG/1
1 TABLET ORAL EVERY 6 HOURS PRN
Qty: 30 TABLET | Refills: 0 | Status: SHIPPED | OUTPATIENT
Start: 2024-06-17

## 2024-06-17 RX ORDER — LISINOPRIL 10 MG/1
10 TABLET ORAL DAILY
Qty: 90 TABLET | Refills: 0 | Status: SHIPPED | OUTPATIENT
Start: 2024-06-17

## 2024-06-19 DIAGNOSIS — K22.719 BARRETT'S ESOPHAGUS WITH DYSPLASIA: ICD-10-CM

## 2024-06-19 RX ORDER — PANTOPRAZOLE SODIUM 40 MG/1
40 TABLET, DELAYED RELEASE ORAL DAILY
Qty: 90 TABLET | Refills: 1 | Status: CANCELLED | OUTPATIENT
Start: 2024-06-19

## 2024-06-26 DIAGNOSIS — Z51.81 ENCOUNTER FOR THERAPEUTIC DRUG MONITORING: ICD-10-CM

## 2024-06-26 DIAGNOSIS — E78.00 HYPERCHOLESTEROLEMIA: ICD-10-CM

## 2024-06-26 DIAGNOSIS — E66.9 CLASS 1 OBESITY WITH SERIOUS COMORBIDITY AND BODY MASS INDEX (BMI) OF 33.0 TO 33.9 IN ADULT, UNSPECIFIED OBESITY TYPE: ICD-10-CM

## 2024-06-26 NOTE — TELEPHONE ENCOUNTER
Requesting   Requested Prescriptions     Pending Prescriptions Disp Refills    WEGOVY 0.5 MG/0.5ML Subcutaneous Solution Auto-injector [Pharmacy Med Name: WEGOVY 0.5MG/0.5ML INJ (4 PENS)] 2 mL 0     Sig: ADMINISTER 0.5 MG UNDER THE SKIN 1 TIME A WEEK     LOV: 3/28/24  RTC: 4 months  Filled: 5/13/24 #2 with 0 refills    Future Appointments   Date Time Provider Department Center   7/25/2024  8:40 AM Daysi Mendez APRN [facility]   9/4/2024  9:00 AM Daysi Mendez APRN [facility]   3/5/2025  9:00 AM Daysi Mendez APRN [facility]

## 2024-07-02 ENCOUNTER — OFFICE VISIT (OUTPATIENT)
Dept: SURGERY | Facility: CLINIC | Age: 59
End: 2024-07-02
Payer: COMMERCIAL

## 2024-07-02 VITALS
DIASTOLIC BLOOD PRESSURE: 70 MMHG | WEIGHT: 176 LBS | OXYGEN SATURATION: 98 % | HEART RATE: 76 BPM | BODY MASS INDEX: 28.62 KG/M2 | SYSTOLIC BLOOD PRESSURE: 102 MMHG | HEIGHT: 65.8 IN

## 2024-07-02 DIAGNOSIS — R11.2 NAUSEA AND VOMITING, UNSPECIFIED VOMITING TYPE: Primary | ICD-10-CM

## 2024-07-02 NOTE — PROGRESS NOTES
[facility], [facility], [place]  [address]  [facility] [address]  Dept: [phone]    7/2/2024    BARIATRIC EXISTING PATIENT/FOLLOW UP    HPI:  Sindy Torres is a 58 year old-year old female who presents for follow-up after sleeve gastrectomy.  Follow-up is somewhat delayed as I had wanted to see her back in 2023.  Nonetheless apparently she had done well up until the last couple months when she started noticing more nausea and vomiting.  She also did start different weight loss medications around this time including Wegovy currently.  Denies any reflux denies any abdominal pain    PCP      Dr Veronica Estrella  Op         5/17/21 sleeve with paraesophageal hernia repair 280        PMH      HTN, reflux, migraine  PSH      Lap sam, umbilical hernia, tubal ligation, knee surgery  meds     fluoxitine, lisinopril, naltrexone, Fioricet, Wegovy, rosuvastatin, ppi    PHYSICAL EXAM:    Vital Signs:  /70   Pulse 76   Ht 5' 5.8\" (1.671 m)   Wt 176 lb (79.8 kg)   LMP  (LMP Unknown)   SpO2 98%   BMI 28.58 kg/m²   BMI:  Body mass index is 28.58 kg/m².  Patient looks good she is friendly moves easily the abdomen is soft incisions are clean and there are no hernias    ASSESSMENT:  History of sleeve gastrectomy several years ago now in the last couple months worsening nausea and vomiting.  She had complained of vomiting in the past even initially after surgery so I do think there is perhaps a component of both behavioral issue/speed and size of swallow but it seems to have improved and then worsened in the last couple of months.  Interestingly she also states her headaches are much worse now with her migraines though she is taking Fioricet in combination with naltrexone???  Nausea and vomiting also coincides to the beginning of GLP agonists and then more recently the changing to Wegovy.   I will send her for an upper GI and if  everything looks okay then I want to discontinue the Wegovy and see how she feels.  In the meantime she is going to follow-up with her primary care provider to discuss taking Fioricet and naltrexone together as this may be a combination of medications that does not work together.  If there is an abnormality on upper GI I would recommend an EGD.    Plan: Plan for upper GI, she is going to follow-up with her primary care provider see me after upper GI    Yonathan Mariscal MD  7/2/2024

## 2024-07-08 ENCOUNTER — LAB SERVICES (OUTPATIENT)
Dept: LAB | Age: 59
End: 2024-07-08

## 2024-07-08 ENCOUNTER — IMAGING SERVICES (OUTPATIENT)
Dept: GENERAL RADIOLOGY | Age: 59
End: 2024-07-08
Attending: INTERNAL MEDICINE

## 2024-07-08 ENCOUNTER — APPOINTMENT (OUTPATIENT)
Dept: INTERNAL MEDICINE | Age: 59
End: 2024-07-08

## 2024-07-08 VITALS
DIASTOLIC BLOOD PRESSURE: 70 MMHG | HEIGHT: 66 IN | BODY MASS INDEX: 28.12 KG/M2 | WEIGHT: 175 LBS | SYSTOLIC BLOOD PRESSURE: 118 MMHG | RESPIRATION RATE: 16 BRPM | TEMPERATURE: 97.3 F | HEART RATE: 64 BPM | OXYGEN SATURATION: 98 %

## 2024-07-08 DIAGNOSIS — R51.9 NONINTRACTABLE EPISODIC HEADACHE, UNSPECIFIED HEADACHE TYPE: ICD-10-CM

## 2024-07-08 DIAGNOSIS — R11.2 NAUSEA AND VOMITING, UNSPECIFIED VOMITING TYPE: Primary | ICD-10-CM

## 2024-07-08 DIAGNOSIS — I10 ESSENTIAL HYPERTENSION: ICD-10-CM

## 2024-07-08 DIAGNOSIS — E78.2 HYPERLIPIDEMIA, MIXED: ICD-10-CM

## 2024-07-08 DIAGNOSIS — R11.2 NAUSEA AND VOMITING, UNSPECIFIED VOMITING TYPE: ICD-10-CM

## 2024-07-08 DIAGNOSIS — R20.0 NUMBNESS OF TOES: ICD-10-CM

## 2024-07-08 DIAGNOSIS — K22.70 BARRETT'S ESOPHAGUS WITHOUT DYSPLASIA: ICD-10-CM

## 2024-07-08 DIAGNOSIS — R25.2 LEG CRAMP: ICD-10-CM

## 2024-07-08 LAB
ALBUMIN SERPL-MCNC: 3.6 G/DL (ref 3.6–5.1)
ALBUMIN/GLOB SERPL: 1.2 {RATIO} (ref 1–2.4)
ALP SERPL-CCNC: 57 UNITS/L (ref 45–117)
ALT SERPL-CCNC: 16 UNITS/L
ANION GAP SERPL CALC-SCNC: 14 MMOL/L (ref 7–19)
AST SERPL-CCNC: 17 UNITS/L
BILIRUB SERPL-MCNC: 0.3 MG/DL (ref 0.2–1)
BUN SERPL-MCNC: 16 MG/DL (ref 6–20)
BUN/CREAT SERPL: 16 (ref 7–25)
CALCIUM SERPL-MCNC: 9 MG/DL (ref 8.4–10.2)
CHLORIDE SERPL-SCNC: 105 MMOL/L (ref 97–110)
CHOLEST SERPL-MCNC: 176 MG/DL
CHOLEST/HDLC SERPL: 2.1 {RATIO}
CK SERPL-CCNC: 87 UNITS/L (ref 26–192)
CO2 SERPL-SCNC: 27 MMOL/L (ref 21–32)
CREAT SERPL-MCNC: 0.98 MG/DL (ref 0.51–0.95)
EGFRCR SERPLBLD CKD-EPI 2021: 67 ML/MIN/{1.73_M2}
FASTING DURATION TIME PATIENT: 8 HOURS (ref 0–999)
GLOBULIN SER-MCNC: 3.1 G/DL (ref 2–4)
GLUCOSE SERPL-MCNC: 72 MG/DL (ref 70–99)
HDLC SERPL-MCNC: 83 MG/DL
LDLC SERPL CALC-MCNC: 85 MG/DL
MAGNESIUM SERPL-MCNC: 1.9 MG/DL (ref 1.7–2.4)
NONHDLC SERPL-MCNC: 93 MG/DL
POTASSIUM SERPL-SCNC: 3.9 MMOL/L (ref 3.4–5.1)
PROT SERPL-MCNC: 6.7 G/DL (ref 6.4–8.2)
SODIUM SERPL-SCNC: 142 MMOL/L (ref 135–145)
TRIGL SERPL-MCNC: 41 MG/DL
VIT B12 SERPL-MCNC: 879 PG/ML (ref 211–911)

## 2024-07-08 PROCEDURE — 82550 ASSAY OF CK (CPK): CPT | Performed by: INTERNAL MEDICINE

## 2024-07-08 PROCEDURE — 36415 COLL VENOUS BLD VENIPUNCTURE: CPT | Performed by: INTERNAL MEDICINE

## 2024-07-08 PROCEDURE — 80053 COMPREHEN METABOLIC PANEL: CPT | Performed by: INTERNAL MEDICINE

## 2024-07-08 PROCEDURE — 82607 VITAMIN B-12: CPT | Performed by: CLINICAL MEDICAL LABORATORY

## 2024-07-08 PROCEDURE — 99214 OFFICE O/P EST MOD 30 MIN: CPT | Performed by: INTERNAL MEDICINE

## 2024-07-08 PROCEDURE — 74018 RADEX ABDOMEN 1 VIEW: CPT | Performed by: RADIOLOGY

## 2024-07-08 PROCEDURE — 3074F SYST BP LT 130 MM HG: CPT | Performed by: INTERNAL MEDICINE

## 2024-07-08 PROCEDURE — 83735 ASSAY OF MAGNESIUM: CPT | Performed by: INTERNAL MEDICINE

## 2024-07-08 PROCEDURE — 80061 LIPID PANEL: CPT | Performed by: INTERNAL MEDICINE

## 2024-07-08 PROCEDURE — 3078F DIAST BP <80 MM HG: CPT | Performed by: INTERNAL MEDICINE

## 2024-07-08 RX ORDER — TIZANIDINE 2 MG/1
2 TABLET ORAL NIGHTLY PRN
Qty: 30 TABLET | Refills: 0 | Status: SHIPPED | OUTPATIENT
Start: 2024-07-08

## 2024-07-08 SDOH — HEALTH STABILITY: PHYSICAL HEALTH: ON AVERAGE, HOW MANY MINUTES DO YOU ENGAGE IN EXERCISE AT THIS LEVEL?: 30 MIN

## 2024-07-08 SDOH — HEALTH STABILITY: PHYSICAL HEALTH: ON AVERAGE, HOW MANY DAYS PER WEEK DO YOU ENGAGE IN MODERATE TO STRENUOUS EXERCISE (LIKE A BRISK WALK)?: 3 DAYS

## 2024-07-08 ASSESSMENT — PATIENT HEALTH QUESTIONNAIRE - PHQ9
SUM OF ALL RESPONSES TO PHQ9 QUESTIONS 1 AND 2: 0
CLINICAL INTERPRETATION OF PHQ2 SCORE: NO FURTHER SCREENING NEEDED
1. LITTLE INTEREST OR PLEASURE IN DOING THINGS: NOT AT ALL
SUM OF ALL RESPONSES TO PHQ9 QUESTIONS 1 AND 2: 0
2. FEELING DOWN, DEPRESSED OR HOPELESS: NOT AT ALL

## 2024-07-09 DIAGNOSIS — E78.2 HYPERLIPIDEMIA, MIXED: Primary | ICD-10-CM

## 2024-07-09 DIAGNOSIS — E78.00 HYPERCHOLESTEROLEMIA: ICD-10-CM

## 2024-07-09 DIAGNOSIS — Z51.81 ENCOUNTER FOR THERAPEUTIC DRUG MONITORING: ICD-10-CM

## 2024-07-09 DIAGNOSIS — E66.9 CLASS 1 OBESITY WITH SERIOUS COMORBIDITY AND BODY MASS INDEX (BMI) OF 33.0 TO 33.9 IN ADULT, UNSPECIFIED OBESITY TYPE: ICD-10-CM

## 2024-07-09 RX ORDER — SEMAGLUTIDE 0.5 MG/.5ML
0.5 INJECTION, SOLUTION SUBCUTANEOUS WEEKLY
Refills: 0 | OUTPATIENT
Start: 2024-07-09

## 2024-07-09 NOTE — TELEPHONE ENCOUNTER
Requesting wegovy 0.5 mg  LOV: 3/28/24  RTC: 4 months  Last Relevant Labs: na  Filled: 5/13/24 #2ml with 1 refills wegovy 1 mg    7/25/2024  8:40 AM Daysi Mendez APRN [facility]   9/4/2024  9:00 AM Daysi Mendez APRN [facility]   3/5/2025  9:00 AM Daysi Mendez APRN [facility]     Denied request for 0.5 mg wegovy as patient is on 1 mg

## 2024-07-13 ENCOUNTER — HOSPITAL ENCOUNTER (OUTPATIENT)
Dept: GENERAL RADIOLOGY | Facility: HOSPITAL | Age: 59
Discharge: HOME | End: 2024-07-13
Attending: SURGERY
Payer: COMMERCIAL

## 2024-07-13 DIAGNOSIS — R11.2 NAUSEA AND VOMITING, UNSPECIFIED VOMITING TYPE: ICD-10-CM

## 2024-07-13 PROCEDURE — 74240 X-RAY XM UPR GI TRC 1CNTRST: CPT | Performed by: SURGERY

## 2024-07-19 ENCOUNTER — E-ADVICE (OUTPATIENT)
Dept: INTERNAL MEDICINE | Age: 59
End: 2024-07-19

## 2024-07-21 DIAGNOSIS — E78.00 HYPERCHOLESTEROLEMIA: ICD-10-CM

## 2024-07-21 DIAGNOSIS — E66.9 CLASS 1 OBESITY WITH SERIOUS COMORBIDITY AND BODY MASS INDEX (BMI) OF 33.0 TO 33.9 IN ADULT, UNSPECIFIED OBESITY TYPE: ICD-10-CM

## 2024-07-21 DIAGNOSIS — Z51.81 ENCOUNTER FOR THERAPEUTIC DRUG MONITORING: ICD-10-CM

## 2024-07-22 RX ORDER — SEMAGLUTIDE 0.5 MG/.5ML
INJECTION, SOLUTION SUBCUTANEOUS
Qty: 2 ML | Refills: 0 | OUTPATIENT
Start: 2024-07-22

## 2024-07-23 DIAGNOSIS — E66.9 CLASS 1 OBESITY WITH SERIOUS COMORBIDITY AND BODY MASS INDEX (BMI) OF 33.0 TO 33.9 IN ADULT, UNSPECIFIED OBESITY TYPE: ICD-10-CM

## 2024-07-23 DIAGNOSIS — E78.00 HYPERCHOLESTEROLEMIA: ICD-10-CM

## 2024-07-23 DIAGNOSIS — Z51.81 ENCOUNTER FOR THERAPEUTIC DRUG MONITORING: ICD-10-CM

## 2024-07-24 NOTE — TELEPHONE ENCOUNTER
Requesting wegovy   LOV: 3/28/24  RTC:  4 months  Last Relevant Labs: na  Filled: 5/13/24 #2ml with 1 refills 1mg dose    Future Appointments   Date Time Provider Department Center   9/4/2024  9:00 AM Daysi Mendez APRN [facility]   9/18/2024 10:20 AM Daysi Mendez APRN [facility]   3/5/2025  9:00 AM Daysi Mendez APRN [facility]

## 2024-08-06 DIAGNOSIS — R51.9 NONINTRACTABLE EPISODIC HEADACHE, UNSPECIFIED HEADACHE TYPE: ICD-10-CM

## 2024-08-06 RX ORDER — TIZANIDINE 2 MG/1
2 TABLET ORAL NIGHTLY PRN
Qty: 30 TABLET | Refills: 0 | Status: SHIPPED | OUTPATIENT
Start: 2024-08-06

## 2024-08-07 DIAGNOSIS — G43.009 MIGRAINE WITHOUT AURA AND WITHOUT STATUS MIGRAINOSUS, NOT INTRACTABLE: ICD-10-CM

## 2024-08-07 RX ORDER — BUTALBITAL, ACETAMINOPHEN AND CAFFEINE 50; 325; 40 MG/1; MG/1; MG/1
1 TABLET ORAL EVERY 6 HOURS PRN
Qty: 30 TABLET | Refills: 0 | Status: SHIPPED | OUTPATIENT
Start: 2024-08-07

## 2024-08-30 SDOH — ECONOMIC STABILITY: FOOD INSECURITY: WITHIN THE PAST 12 MONTHS, THE FOOD YOU BOUGHT JUST DIDN'T LAST AND YOU DIDN'T HAVE MONEY TO GET MORE.: NEVER TRUE

## 2024-08-30 SDOH — HEALTH STABILITY: PHYSICAL HEALTH: ON AVERAGE, HOW MANY DAYS PER WEEK DO YOU ENGAGE IN MODERATE TO STRENUOUS EXERCISE (LIKE A BRISK WALK)?: 2 DAYS

## 2024-08-30 SDOH — ECONOMIC STABILITY: TRANSPORTATION INSECURITY
IN THE PAST 12 MONTHS, HAS LACK OF RELIABLE TRANSPORTATION KEPT YOU FROM MEDICAL APPOINTMENTS, MEETINGS, WORK OR FROM GETTING THINGS NEEDED FOR DAILY LIVING?: NO

## 2024-08-30 SDOH — ECONOMIC STABILITY: HOUSING INSECURITY: DO YOU HAVE PROBLEMS WITH ANY OF THE FOLLOWING?: NONE OF THE ABOVE

## 2024-08-30 SDOH — ECONOMIC STABILITY: HOUSING INSECURITY: WHAT IS YOUR LIVING SITUATION TODAY?: I HAVE A STEADY PLACE TO LIVE

## 2024-08-30 SDOH — ECONOMIC STABILITY: GENERAL

## 2024-08-30 SDOH — SOCIAL STABILITY: SOCIAL NETWORK
HOW OFTEN DO YOU SEE OR TALK TO PEOPLE THAT YOU CARE ABOUT AND FEEL CLOSE TO? (FOR EXAMPLE: TALKING TO FRIENDS ON THE PHONE, VISITING FRIENDS OR FAMILY, GOING TO CHURCH OR CLUB MEETINGS): 5 OR MORE TIMES A WEEK

## 2024-08-30 SDOH — HEALTH STABILITY: PHYSICAL HEALTH: ON AVERAGE, HOW MANY MINUTES DO YOU ENGAGE IN EXERCISE AT THIS LEVEL?: 30 MIN

## 2024-08-30 ASSESSMENT — LIFESTYLE VARIABLES
AUDIT-C TOTAL SCORE: 1
HOW MANY STANDARD DRINKS CONTAINING ALCOHOL DO YOU HAVE ON A TYPICAL DAY: 0,1 OR 2
HOW OFTEN DO YOU HAVE A DRINK CONTAINING ALCOHOL: MONTHLY OR LESS

## 2024-08-30 ASSESSMENT — SOCIAL DETERMINANTS OF HEALTH (SDOH): IN THE PAST 12 MONTHS, HAS THE ELECTRIC, GAS, OIL, OR WATER COMPANY THREATENED TO SHUT OFF SERVICE IN YOUR HOME?: NO

## 2024-09-02 DIAGNOSIS — R51.9 NONINTRACTABLE EPISODIC HEADACHE, UNSPECIFIED HEADACHE TYPE: ICD-10-CM

## 2024-09-03 ENCOUNTER — OFFICE VISIT (OUTPATIENT)
Dept: SURGERY | Facility: CLINIC | Age: 59
End: 2024-09-03
Payer: COMMERCIAL

## 2024-09-03 VITALS
HEART RATE: 52 BPM | HEIGHT: 65.8 IN | OXYGEN SATURATION: 98 % | DIASTOLIC BLOOD PRESSURE: 70 MMHG | SYSTOLIC BLOOD PRESSURE: 108 MMHG | WEIGHT: 167 LBS | BODY MASS INDEX: 27.16 KG/M2

## 2024-09-03 RX ORDER — TIZANIDINE 2 MG/1
2 TABLET ORAL NIGHTLY PRN
Qty: 30 TABLET | Refills: 0 | Status: SHIPPED | OUTPATIENT
Start: 2024-09-03

## 2024-09-03 NOTE — PROGRESS NOTES
[facility], [facility], [place]  [address]  [place] [address]  Dept: [phone]    9/3/2024    BARIATRIC EXISTING PATIENT/FOLLOW UP    HPI:  Sindy Torres is a 59 year old-year old female who presents for continued follow-up after weight loss surgery.  Last time I saw her she was having issues with headaches nausea vomiting made some adjustments on her medications and sent her for an upper GI.  Upper GI was normal for an obstructive process however did show reflux which we knew about.  Her symptoms have improved dramatically no longer having any vomiting.  She is taking her Protonix regularly and on occasion will take it twice a day if she feels a little bit of heartburn at night.  Nonetheless she is feeling better.    PCP      Dr Veronica Estrella  Op         5/17/21 sleeve with paraesophageal hernia repair 280        PMH      HTN, reflux, migraine  PSH      Lap sam, umbilical hernia, tubal ligation, knee surgery  meds     lisinopril, Fioricet, Wegovy, rosuvastatin, ppi       PHYSICAL EXAM:    Vital Signs:  /70   Pulse 52   Ht 5' 5.8\" (1.671 m)   Wt 167 lb (75.8 kg)   LMP  (LMP Unknown)   SpO2 98%   BMI 27.12 kg/m²   BMI:  Body mass index is 27.12 kg/m².  Patient looks good her breathing is unlabored abdomen soft moves easily    ASSESSMENT:  Status post sleeve gastrectomy doing well.  Follow-up in 6 months    Plan: Follow-up 6 months    Yonathan Mariscal MD  9/3/2024

## 2024-09-04 ENCOUNTER — OFFICE VISIT (OUTPATIENT)
Dept: INTERNAL MEDICINE CLINIC | Facility: CLINIC | Age: 59
End: 2024-09-04
Payer: COMMERCIAL

## 2024-09-04 VITALS
SYSTOLIC BLOOD PRESSURE: 110 MMHG | BODY MASS INDEX: 27.82 KG/M2 | DIASTOLIC BLOOD PRESSURE: 74 MMHG | HEART RATE: 65 BPM | HEIGHT: 65 IN | RESPIRATION RATE: 16 BRPM | WEIGHT: 167 LBS

## 2024-09-04 DIAGNOSIS — I10 ESSENTIAL HYPERTENSION: ICD-10-CM

## 2024-09-04 DIAGNOSIS — Z86.39 HISTORY OF MORBID OBESITY: ICD-10-CM

## 2024-09-04 DIAGNOSIS — E66.3 OVERWEIGHT (BMI 25.0-29.9): ICD-10-CM

## 2024-09-04 DIAGNOSIS — Z51.81 ENCOUNTER FOR THERAPEUTIC DRUG MONITORING: Primary | ICD-10-CM

## 2024-09-04 DIAGNOSIS — K21.9 GASTROESOPHAGEAL REFLUX DISEASE, UNSPECIFIED WHETHER ESOPHAGITIS PRESENT: ICD-10-CM

## 2024-09-04 DIAGNOSIS — E78.00 HYPERCHOLESTEROLEMIA: ICD-10-CM

## 2024-09-04 DIAGNOSIS — F43.9 STRESS: ICD-10-CM

## 2024-09-04 DIAGNOSIS — R20.2 PARESTHESIA OF BOTH FEET: ICD-10-CM

## 2024-09-04 DIAGNOSIS — Z90.3 HISTORY OF SLEEVE GASTRECTOMY: ICD-10-CM

## 2024-09-04 DIAGNOSIS — Z90.49 HISTORY OF CHOLECYSTECTOMY: ICD-10-CM

## 2024-09-04 DIAGNOSIS — Z13.21 ENCOUNTER FOR VITAMIN DEFICIENCY SCREENING: ICD-10-CM

## 2024-09-04 PROCEDURE — 99214 OFFICE O/P EST MOD 30 MIN: CPT | Performed by: NURSE PRACTITIONER

## 2024-09-04 PROCEDURE — 3078F DIAST BP <80 MM HG: CPT | Performed by: NURSE PRACTITIONER

## 2024-09-04 PROCEDURE — 3074F SYST BP LT 130 MM HG: CPT | Performed by: NURSE PRACTITIONER

## 2024-09-04 PROCEDURE — 3008F BODY MASS INDEX DOCD: CPT | Performed by: NURSE PRACTITIONER

## 2024-09-04 RX ORDER — TIZANIDINE 2 MG/1
1 TABLET ORAL NIGHTLY PRN
COMMUNITY
Start: 2024-08-06

## 2024-09-04 NOTE — PATIENT INSTRUCTIONS
Continue making lifestyle changes that focus on good nutrition, regular exercise and stress management.    Medication Plan: Continue Wegovy at current dose. Reduce Fluoxetine to 20 mg daily for 2 weeks and then can discontinue. Monitor mood over the next month after discontinuing Fluoxetine. Continue bariatric vitamin.    Next steps to work on before next office visit include: Check non fasting lab. Recommend CT heart scan for early screening heart disease. Continue to be an intuitive eater and listening to what your body needs. Tips for the holidays listed below.     Repeat body composition (BC) completed today in comparison to previous BC with %change in total body fat from 48.8 to 36.7% (goal <32%), Visceral Fat 9 (goal <10), BMI from 43.7 to 27.2 and muscle mass from 10.7 to 16.5% (goal >18%). Great work with now 100# lost!      Holiday Weight and How to Avoid It    [employer] by Dari Lay, PhD  https://www.obesityaction.org/resources/holiday-weight-and-dsp-ik-kqcvw-it/      When we think about the holidays many things come to mind - gifts, shopping, parties, family, decorating, long to-do lists --and delicious holiday treats.    Strategies for Avoiding Holiday Weight Gain  The holiday season is a busy time, and there are eating opportunities everywhere we go, such as family gatherings, office parties with trays of home-baked treats in the lunchroom, holiday and ryl-uv-fcoyronc programs at our kids’ schools, treat samples being given away as we make our way through the stores to do our holiday shopping and catalogs in our mailboxes with mouth-watering photo spreads on every page. We’re really busy, perhaps too busy to prepare the healthy meals we might otherwise prepare.    Here are a few tips that will help you negotiate this joyful time with minimum risk to your weight management goals:    Focus on maintaining your current weight. Challenging yourself to lose weight over the holidays is  setting yourself up for failure.  Don’t gorge on any special holiday food because you only get to eat it once a year. With luck, you’ll still be around to enjoy it next year. On the other hand, don’t deprive yourself of anything you want to taste. Instead, take a mindful bite, savoring the sight, taste, aroma, mouth feel and sound of each special holiday treat. Eating like this leads to increased pleasure, quicker satisfaction and decreased risk for weight gain.  Avoid the trap of thinking you can eat what you want because you can just start over in the New Year. It doesn’t get any easier just because it’s January - there are always other reasons to indulge and to celebrate.  Keep up your exercise routine. This will also help reduce holiday stress.  Keep tabs on yourself. Write down what you eat, weigh yourself if you want to or try on your favorite clothes to make sure they still fit.  Create meaning beyond the food by creating new traditions that have nothing to do with food. For example, change “in our family we always have chocolate cinnamon bread with whipped cream on Sonia morning” into “in our family we always play in the snow (or on the beach), or go for a long walk/take food and gifts to the homeless shelter on Sonia morning.”  Sometimes, eating a particular food is our way of remembering a lost loved one. If that applies to you, find another way to remember them, like sharing memories with family members.  Remember all the reasons why reaching and maintaining a healthy weight is important to you.  Remember, unless you’re an elite athlete, you’re unlikely to be able to “exercise off” weeks of overindulgence.  Strategies for Holiday Parties  Most of us love holiday parties and look forward to them all year. We get to dress up and go to nice places, spend time with our nearest and dearest, enjoy our favorite holiday music and engage in the traditions that are meaningful to us. Despite all of the  excitement, parties can also be minefields when it comes to honoring our healthy lifestyle goals. When we love parties, we may over-indulge as a way of intensifying the positive emotions we’re already feeling, and when we dislike parties, we may over-indulge in an effort to distract ourselves from the emotional discomfort that we’re feeling.    Here are a few tips that will help you get through holiday parties without sabotaging your goals:    Avoid wearing baggy clothing that allows you to expand as you eat.  After you’ve eaten, stay away from the food tables at the party.  Keep your hands busy by finding a way to help out. It’s the best way to distract yourself from the food.  Avoid alcohol. When we drink, we’re more likely to abandon healthy eating.  Fill up with water and other low-calorie drinks.  Take a healthy dish for the pot luck - something you can eat: consider salad, fruit, raw vegetables and a healthy dip.  Focus on your relationships, not on the food - learn to focus on enjoying the people and the special holiday experiences, on building special memories for yourself and your family.  Meeting new people is another good way of distracting yourself from the food. If you’re shy, simply be a good listener.  Plan ahead. The best kind of plan, when it comes to food, is about what you are going to eat - not about what you’re not going to eat. If we focus on what we can’t eat (or what we think we shouldn’t eat), this kind of thinking can set us up for failure because it simply leaves us feeling deprived.  Don’t arrive completely famished - you’ll be more likely to eat in a way you’ll later regret. Plan to eat on the light side both before and after the event. Think about your meal plan for the day, and leave yourself some room to eat at the party.  Coping with Holiday Stress  As you know, the holiday season can be joyful and stressful at the same time. There’s so much to do, being around family can sometimes  be difficult and often, we set ourselves the goal of creating the “perfect” holiday. Being stressed puts us at risk for stress-based eating in an effort to cope.    Here are some strategies you can use to reduce your stress levels:    Focus on what you’re grateful for.  Practice deep breathing whenever you feel overwhelmed.  Keep up your exercise routine.  Remind yourself to do just one thing at a time.  Remember -- you cannot do more than your best.  Be willing to say “no” to some events, tasks or requests. Sometimes this is the best way we can take care of ourselves.  Create a holiday season schedule for yourself. Schedule and prioritize everything you need to get done.  Reduce your expectations - aim for “good enough,” not “perfect.”  If you’re alone during the holidays, pamper yourself and find a way to help others who are less fortunate. This will help reduce your loneliness.  If your relationships with family members are strained, remember that over-indulging in your favorite holiday comfort foods is not going to change how they behave towards you!  Create fun times for yourself. Having fun is a great way of reducing stress!  I hope these tips will help you not just get through the holidays, but that they’ll allow you to feel reassured that you can still have a fun and meaningful time without having to sacrifice your weight management goals. Wishing you a happy, healthy and meaningful holiday season!

## 2024-09-04 NOTE — PROGRESS NOTES
Sindy Torres is a 59 year old female presents today for follow-up on medical weight loss program for the treatment of overweight, obesity, or morbid obesity with hx of VSG in 5/2021 and associated HTN, Hyperlipidemia, OA, GERD.    S:  Current weight   Wt Readings from Last 6 Encounters:   09/04/24 167 lb (75.8 kg)   09/03/24 167 lb (75.8 kg)   07/02/24 176 lb (79.8 kg)   03/28/24 195 lb (88.5 kg)   01/22/24 203 lb (92.1 kg)   03/01/23 202 lb (91.6 kg)    AND BMI Body mass index is 27.79 kg/m²..    Patient has lost 28# since LOV 5 months ago. She has been consistent with medication. No longer taking Zepbound as she states it did not agree with her. GERD manageable through dietary changes and PPI therapy. Has seen GI and Dr. Mariscal for f/u. Labs reviewed in EMR, HgbA1c now normal. Reported 6 months of paresthesia to lower legs/feet with worsening. Discussed with PCP and planning to see podiatry. Symptoms can wake her from night at times and legs feel sensitive to touch. She reports mood is good and stress low. Would like to go off Fluoxetine.    Testing/consult completed since LOV: Dietician: Estimated caloric needs for weight loss: 1550 cals/d for 2 pounds/week weight loss.    [facility] Follow Up    Question 8/30/2024  8:02 AM CDT - Filed by Patient   Please describe a success moment: Loosing weight   Please describe a challenging moment/needs for improvement: Getting enough nutrients   Please complete this 24 hour food journal, listing everything you had to eat in the past day. Include the average time of day you ate these meals at    List foods, qty and prep for breakfast: Protein drink or fruit   List foods, qty and prep for lunch. Soup   List foods, qty and prep for dinner. Fish, chicken or meat with veggies   List foods, qty and prep for snacks. Fruit   List the types and qty of fluids consumed Water, hot tea and peach tea   On average, how many meals did you eat out per week? 8    Exercise    How many days per week are you active or exercise 2   On average, how many days were anaerobic (strength/resistance) exercises performed? 2   On average, how many days were aerobic (cardio) exercises performed? 2   Perceived level of exertion on a scale of 1-5, with 5 being very intense: 2   Stress    Average stress level on a scale of 1-10, with 10 being extremely stressed: 5   If greater than 5/1O how would you grade your coping mechanisms? moderate   Sleep hours and integrity    How many hours of uninterrupted sleep do you get a night: 5   Do you feel rested in the morning: No   If no, what may have been disrupting your sleep? Pain in feet   Please list any goal(s) for your next visit More nutrients     Social hx and PMH reviewed. Employed from home full time.  with 3 kids, 1 set of twins. Good spousal support.    REVIEW OF SYSTEMS:  GENERAL: feels well otherwise  LUNGS: denies shortness of breath with exertion  CARDIOVASCULAR: denies chest pain on exertion, denies palpitations or pedal edema  GI: denies abdominal pain.  No N/V/D/C. +GERD  MUSCULOSKELETAL: no acute joint or muscle pain  NEURO: denies headaches or dizziness. Migraines stable. Paresthesia to bilateral lower legs/feet. See above.  PSYCH: denies change in behavior or mood, denies feeling sad or depressed.      EXAM:  /74   Pulse 65   Resp 16   Ht 5' 5\" (1.651 m)   Wt 167 lb (75.8 kg)   LMP  (LMP Unknown)   BMI 27.79 kg/m²   GENERAL: well developed, well nourished, in no apparent distress, overweight  EYES: conjunctiva pink, sclera non icteric, PERRLA  LUNGS: CTA in all fields, breathing non labored  CARDIO: regular rhythm without murmur, normal S1 and S2 without clicks or gallops, no pedal edema.  GI: +BS  NEURO/MS: motor and sensory grossly intact  PSYCH: pleasant, cooperative, normal mood and affect    ASSESSMENT AND PLAN:  Encounter Diagnoses   Name Primary?    Encounter for therapeutic drug monitoring Yes     Overweight (BMI 25.0-29.9)     Hypercholesterolemia     Essential hypertension     Gastroesophageal reflux disease, unspecified whether esophagitis present     Paresthesia of both feet     History of morbid obesity     History of sleeve gastrectomy     Stress     Encounter for vitamin deficiency screening     History of cholecystectomy          Orders Placed This Encounter   Procedures    Vitamin B1 (Thiamine), Blood       Meds & Refills for this Visit:  Requested Prescriptions     Signed Prescriptions Disp Refills    FLUoxetine 20 MG Oral Cap 30 capsule 3     Sig: Take 1 capsule (20 mg total) by mouth daily.    semaglutide-weight management 1.7 MG/0.75ML Subcutaneous Solution Auto-injector 9 mL 1     Sig: Inject 0.75 mL (1.7 mg total) into the skin once a week.       Imaging & Consults:  CT CALCIUM SCORING      Plan:  Patient has lost 28# since LOV 5 months ago on fluoxetine 40 mg daily, Wegovy 1.7 mg weekly with a total weight loss of 100# since initial consult on 5/21/2019 with initial weight of 267#.  Weight loss goal:  lose 40# in 6 months, 75# in 1 year. BP controlled. CPM, aside from wean off Fluoxetine s directed. Hx of Qsymia, Naltrexone. Check Vitamin B1 lab. Continue bariatric vitamin. Repeat BC completed and reviewed. Recommend CT heart scan for screening.  on holiday tips. See patient instructions below for additional plans and patient counseling.    Patient Instructions   Continue making lifestyle changes that focus on good nutrition, regular exercise and stress management.    Medication Plan: Continue Wegovy at current dose. Reduce Fluoxetine to 20 mg daily for 2 weeks and then can discontinue. Monitor mood over the next month after discontinuing Fluoxetine. Continue bariatric vitamin.    Next steps to work on before next office visit include: Check non fasting lab. Recommend CT heart scan for early screening heart disease. Continue to be an intuitive eater and listening to what your body  needs. Tips for the holidays listed below.     Repeat body composition (BC) completed today in comparison to previous BC with %change in total body fat from 48.8 to 36.7% (goal <32%), Visceral Fat 9 (goal <10), BMI from 43.7 to 27.2 and muscle mass from 10.7 to 16.5% (goal >18%). Great work with now 100# lost!      Holiday Weight and How to Avoid It    [employer] by Dari Lay, PhD  https://www.obesityaction.org/resources/holiday-weight-and-owm-rn-cabkb-it/      When we think about the holidays many things come to mind - gifts, shopping, parties, family, decorating, long to-do lists --and delicious holiday treats.    Strategies for Avoiding Holiday Weight Gain  The holiday season is a busy time, and there are eating opportunities everywhere we go, such as family gatherings, office parties with trays of home-baked treats in the lunchroom, holiday and [id number] programs at our kids’ schools, treat samples being given away as we make our way through the stores to do our holiday shopping and catalogs in our mailboxes with mouth-watering photo spreads on every page. We’re really busy, perhaps too busy to prepare the healthy meals we might otherwise prepare.    Here are a few tips that will help you negotiate this joyful time with minimum risk to your weight management goals:    Focus on maintaining your current weight. Challenging yourself to lose weight over the holidays is setting yourself up for failure.  Don’t gorge on any special holiday food because you only get to eat it once a year. With luck, you’ll still be around to enjoy it next year. On the other hand, don’t deprive yourself of anything you want to taste. Instead, take a mindful bite, savoring the sight, taste, aroma, mouth feel and sound of each special holiday treat. Eating like this leads to increased pleasure, quicker satisfaction and decreased risk for weight gain.  Avoid the trap of thinking you can eat what you want because you  can just start over in the New Year. It doesn’t get any easier just because it’s January - there are always other reasons to indulge and to celebrate.  Keep up your exercise routine. This will also help reduce holiday stress.  Keep tabs on yourself. Write down what you eat, weigh yourself if you want to or try on your favorite clothes to make sure they still fit.  Create meaning beyond the food by creating new traditions that have nothing to do with food. For example, change “in our family we always have chocolate cinnamon bread with whipped cream on Stephan morning” into “in our family we always play in the snow (or on the beach), or go for a long walk/take food and gifts to the homeless shelter on Stephan morning.”  Sometimes, eating a particular food is our way of remembering a lost loved one. If that applies to you, find another way to remember them, like sharing memories with family members.  Remember all the reasons why reaching and maintaining a healthy weight is important to you.  Remember, unless you’re an elite athlete, you’re unlikely to be able to “exercise off” weeks of overindulgence.  Strategies for Holiday Parties  Most of us love holiday parties and look forward to them all year. We get to dress up and go to nice places, spend time with our nearest and dearest, enjoy our favorite holiday music and engage in the traditions that are meaningful to us. Despite all of the excitement, parties can also be minefields when it comes to honoring our healthy lifestyle goals. When we love parties, we may over-indulge as a way of intensifying the positive emotions we’re already feeling, and when we dislike parties, we may over-indulge in an effort to distract ourselves from the emotional discomfort that we’re feeling.    Here are a few tips that will help you get through holiday parties without sabotaging your goals:    Avoid wearing baggy clothing that allows you to expand as you eat.  After you’ve eaten,  stay away from the food tables at the party.  Keep your hands busy by finding a way to help out. It’s the best way to distract yourself from the food.  Avoid alcohol. When we drink, we’re more likely to abandon healthy eating.  Fill up with water and other low-calorie drinks.  Take a healthy dish for the pot luck - something you can eat: consider salad, fruit, raw vegetables and a healthy dip.  Focus on your relationships, not on the food - learn to focus on enjoying the people and the special holiday experiences, on building special memories for yourself and your family.  Meeting new people is another good way of distracting yourself from the food. If you’re shy, simply be a good listener.  Plan ahead. The best kind of plan, when it comes to food, is about what you are going to eat - not about what you’re not going to eat. If we focus on what we can’t eat (or what we think we shouldn’t eat), this kind of thinking can set us up for failure because it simply leaves us feeling deprived.  Don’t arrive completely famished - you’ll be more likely to eat in a way you’ll later regret. Plan to eat on the light side both before and after the event. Think about your meal plan for the day, and leave yourself some room to eat at the party.  Coping with Holiday Stress  As you know, the holiday season can be joyful and stressful at the same time. There’s so much to do, being around family can sometimes be difficult and often, we set ourselves the goal of creating the “perfect” holiday. Being stressed puts us at risk for stress-based eating in an effort to cope.    Here are some strategies you can use to reduce your stress levels:    Focus on what you’re grateful for.  Practice deep breathing whenever you feel overwhelmed.  Keep up your exercise routine.  Remind yourself to do just one thing at a time.  Remember -- you cannot do more than your best.  Be willing to say “no” to some events, tasks or requests. Sometimes this is the  best way we can take care of ourselves.  Create a holiday season schedule for yourself. Schedule and prioritize everything you need to get done.  Reduce your expectations - aim for “good enough,” not “perfect.”  If you’re alone during the holidays, pamper yourself and find a way to help others who are less fortunate. This will help reduce your loneliness.  If your relationships with family members are strained, remember that over-indulging in your favorite holiday comfort foods is not going to change how they behave towards you!  Create fun times for yourself. Having fun is a great way of reducing stress!  I hope these tips will help you not just get through the holidays, but that they’ll allow you to feel reassured that you can still have a fun and meaningful time without having to sacrifice your weight management goals. Wishing you a happy, healthy and meaningful holiday season!        Medication use and SEs reviewed with patient.    Return in about 5 months (around 2/4/2025) for weight management via clinic.    Patient verbalizes understanding.    DOCUMENTATION OF TIME SPENT: Code selection for this visit was based on time spent : 30 minutes on date of service in preparing to see the patient, obtaining and/or reviewing separately obtained history, performing a medically appropriate examination, counseling and educating the patient/family/caregiver, ordering medications or testing, referring and communicating with other healthcare providers, documenting clinical information in the electronic medical record, independently interpreting results and communicating results to the patient/family/caregiver and care coordination with the patient's other providers.    Answers submitted by the patient for this visit:  Medical Weight Loss Follow Up (Submitted on 8/30/2024)  If greater than 5/1O how would you grade your coping mechanisms?: moderate

## 2024-09-05 ENCOUNTER — APPOINTMENT (OUTPATIENT)
Dept: INTERNAL MEDICINE | Age: 59
End: 2024-09-05

## 2024-09-05 VITALS
OXYGEN SATURATION: 98 % | DIASTOLIC BLOOD PRESSURE: 76 MMHG | WEIGHT: 168.4 LBS | SYSTOLIC BLOOD PRESSURE: 112 MMHG | HEIGHT: 66 IN | BODY MASS INDEX: 27.06 KG/M2 | TEMPERATURE: 97.4 F | RESPIRATION RATE: 16 BRPM | HEART RATE: 81 BPM

## 2024-09-05 DIAGNOSIS — K22.719 BARRETT'S ESOPHAGUS WITH DYSPLASIA: ICD-10-CM

## 2024-09-05 DIAGNOSIS — K21.9 GASTROESOPHAGEAL REFLUX DISEASE, UNSPECIFIED WHETHER ESOPHAGITIS PRESENT: Primary | ICD-10-CM

## 2024-09-05 PROCEDURE — 99214 OFFICE O/P EST MOD 30 MIN: CPT | Performed by: INTERNAL MEDICINE

## 2024-09-05 RX ORDER — PANTOPRAZOLE SODIUM 40 MG/1
40 TABLET, DELAYED RELEASE ORAL 2 TIMES DAILY
Qty: 180 TABLET | Refills: 1 | Status: SHIPPED | OUTPATIENT
Start: 2024-09-05

## 2024-09-05 ASSESSMENT — PATIENT HEALTH QUESTIONNAIRE - PHQ9
SUM OF ALL RESPONSES TO PHQ9 QUESTIONS 1 AND 2: 0
2. FEELING DOWN, DEPRESSED OR HOPELESS: NOT AT ALL
CLINICAL INTERPRETATION OF PHQ2 SCORE: NO FURTHER SCREENING NEEDED
1. LITTLE INTEREST OR PLEASURE IN DOING THINGS: NOT AT ALL
SUM OF ALL RESPONSES TO PHQ9 QUESTIONS 1 AND 2: 0

## 2024-09-14 DIAGNOSIS — K22.719 BARRETT'S ESOPHAGUS WITH DYSPLASIA: ICD-10-CM

## 2024-09-14 RX ORDER — PANTOPRAZOLE SODIUM 40 MG/1
40 TABLET, DELAYED RELEASE ORAL DAILY
Qty: 90 TABLET | Refills: 0 | OUTPATIENT
Start: 2024-09-14

## 2024-09-14 RX ORDER — LISINOPRIL 10 MG/1
10 TABLET ORAL DAILY
Qty: 90 TABLET | Refills: 0 | Status: SHIPPED | OUTPATIENT
Start: 2024-09-14

## 2024-10-29 DIAGNOSIS — G43.009 MIGRAINE WITHOUT AURA AND WITHOUT STATUS MIGRAINOSUS, NOT INTRACTABLE: ICD-10-CM

## 2024-10-29 RX ORDER — BUTALBITAL, ACETAMINOPHEN AND CAFFEINE 50; 325; 40 MG/1; MG/1; MG/1
1 TABLET ORAL EVERY 6 HOURS PRN
Qty: 30 TABLET | OUTPATIENT
Start: 2024-10-29

## 2024-10-29 RX ORDER — BUTALBITAL, ACETAMINOPHEN AND CAFFEINE 50; 325; 40 MG/1; MG/1; MG/1
1 TABLET ORAL EVERY 6 HOURS PRN
Qty: 30 TABLET | Refills: 0 | Status: SHIPPED | OUTPATIENT
Start: 2024-10-29

## 2024-11-01 DIAGNOSIS — E66.3 OVERWEIGHT (BMI 25.0-29.9): ICD-10-CM

## 2024-11-01 DIAGNOSIS — Z51.81 ENCOUNTER FOR THERAPEUTIC DRUG MONITORING: ICD-10-CM

## 2024-11-01 DIAGNOSIS — F43.9 STRESS: ICD-10-CM

## 2024-11-04 NOTE — TELEPHONE ENCOUNTER
Requesting fluoxetine 20  LOV: 9/4/24  RTC: 5 months  Filled: 9/4/24 #30 with 3 refills    Future Appointments   Date Time Provider Department Center   11/19/2024  6:30 AM  [facility]  [facility]   11/19/2024  7:00 AM  CT MAIN [facility]  CT [facility]   2/6/2025  8:40 AM Daysi Mendez APRN [facility]   5/15/2025  8:40 AM Daysi Mendez APRN [facility]     Medication Plan: Continue Wegovy at current dose. Reduce Fluoxetine to 20 mg daily for 2 weeks and then can discontinue. Monitor mood over the next month after discontinuing Fluoxetine. Continue bariatric vitamin.     Patient should be off this medication and has refills?

## 2024-11-13 ENCOUNTER — TELEPHONE (OUTPATIENT)
Dept: SURGERY | Facility: CLINIC | Age: 59
End: 2024-11-13

## 2024-11-13 DIAGNOSIS — R51.9 NONINTRACTABLE EPISODIC HEADACHE, UNSPECIFIED HEADACHE TYPE: ICD-10-CM

## 2024-11-13 RX ORDER — TIZANIDINE 2 MG/1
2 TABLET ORAL NIGHTLY PRN
Qty: 30 TABLET | Refills: 0 | Status: SHIPPED | OUTPATIENT
Start: 2024-11-13

## 2024-11-15 ENCOUNTER — ORDER TRANSCRIPTION (OUTPATIENT)
Dept: ADMINISTRATIVE | Facility: HOSPITAL | Age: 59
End: 2024-11-15

## 2024-11-15 DIAGNOSIS — Z13.6 SCREENING FOR CARDIOVASCULAR CONDITION: Primary | ICD-10-CM

## 2024-11-19 ENCOUNTER — HOSPITAL ENCOUNTER (OUTPATIENT)
Dept: CT IMAGING | Facility: HOSPITAL | Age: 59
Discharge: HOME | End: 2024-11-19
Attending: NURSE PRACTITIONER

## 2024-11-19 ENCOUNTER — LAB ENCOUNTER (OUTPATIENT)
Dept: LAB | Facility: HOSPITAL | Age: 59
End: 2024-11-19
Attending: NURSE PRACTITIONER

## 2024-11-19 VITALS — HEIGHT: 65 IN | BODY MASS INDEX: 25.66 KG/M2 | WEIGHT: 154 LBS

## 2024-11-19 DIAGNOSIS — R20.2 PARESTHESIA OF BOTH FEET: ICD-10-CM

## 2024-11-19 DIAGNOSIS — Z13.6 SCREENING FOR CARDIOVASCULAR CONDITION: ICD-10-CM

## 2024-11-19 DIAGNOSIS — Z51.81 ENCOUNTER FOR THERAPEUTIC DRUG MONITORING: ICD-10-CM

## 2024-11-19 DIAGNOSIS — K21.9 GASTROESOPHAGEAL REFLUX DISEASE, UNSPECIFIED WHETHER ESOPHAGITIS PRESENT: ICD-10-CM

## 2024-11-19 DIAGNOSIS — Z13.21 ENCOUNTER FOR VITAMIN DEFICIENCY SCREENING: ICD-10-CM

## 2024-11-19 DIAGNOSIS — E66.3 OVERWEIGHT (BMI 25.0-29.9): ICD-10-CM

## 2024-11-19 LAB
GLUCOSE POC: 64 MG/DL (ref 70–100)
HDL POC: 75 MG/DL (ref 40–60)
LDL POC: 100 MG/DL (ref 0–130)
TC/HDL RATIO: 2 (ref 0–5)
TOTAL CHOLESTEROL POC: 151 MG/DL (ref 0–200)
TRIGLYCERIDES POC: 45 MG/DL (ref 0–200)

## 2024-11-19 PROCEDURE — 84425 ASSAY OF VITAMIN B-1: CPT

## 2024-11-19 PROCEDURE — 36415 COLL VENOUS BLD VENIPUNCTURE: CPT

## 2024-11-19 NOTE — PROGRESS NOTES
Date of Service 11/19/2024    ANNY HERNANDEZ  Date of Birth 8/10/1965    Patient Age: 59 year old    PCP: Veronica Nick  [address] [place] [address] [address] [place] [address]    Heart Scan Consult  Preliminary Heart Scan Score: 0    Previous Screening  Heart Scan Completed Previously: No        Peripheral Vascular Scan Completed Previously: No          Risk Factors  Personal Risk Factors  Non-alterable Risk Factors: Family History (Father passed suddenly, no known cause)  Alterable Risk Factors: High Blood Pressure;Stress      Body Mass Index  Body mass index is 25.63 kg/m².    Blood Pressure  Blood Pressure measurement declined during this encounter.  (Normal =< 120/80,  Elevated = 120-129/ >80,  High Stage1 130-139/80-89 , Stage2 >140/>90)    Lipid Profile  Patient was in fasting state: Yes    Cholesterol: 151, done on 11/19/2024.  HDL Cholesterol: 75, done on 11/19/2024.  LDL Cholesterol: 100, done on 11/19/2024.  TriGlycerides 45, done on 11/19/2024.    Cholesterol Goals  Value   Total  =< 200   HDL  = > 45 Men = > 55 Women   LDL   =< 100   Triglycerides  =< 150       Glucose and Hemoglobin A1C  Lab Results   Component Value Date    GLU 64 (A) 11/19/2024    A1C 5.5 08/26/2021     (Normal Fasting Glucose < 100mg/dl )Given juice and radames cracker for blood sugar under 70. No signs and symptoms of hypoglycemia.    Nurse Review  Risk factor information and results reviewed with Nurse: Yes    Recommended Follow Up:  Consult your physician regarding:: Final Heart Scan Report;Discuss potential for Incidental Finding;Discuss Potential for Score Variance      Recommendations for Change:  Nutrition Changes: Increase Fiber;Low Fat Dairy;Low Saturated Fat (Patient underwent sleeve gastrectomy and has acid reflex and currently is not toleratig many foods. Reviewed a heart healthy diet.)    Cholesterol Modification (goal of therapy depends upon your risk): No Change Needed    Exercise: No Change Needed (Exercise on ellipitical, walking pad  on at work and does strength training.)    Smoking Cessation: No Change Needed    Weight Management: Maintain Current Weight                        [facility] Recommended Resources:  Recommended Resources: Upcoming Classes, Medical Services and Health Library [email or web address]            Tari CAMEJO RN        Please Contact the Nurse Heart Line with any Questions or Concerns [phone].

## 2024-11-24 ENCOUNTER — TELEPHONE (OUTPATIENT)
Dept: INTERNAL MEDICINE | Age: 59
End: 2024-11-24

## 2024-11-24 DIAGNOSIS — M54.6 THORACIC BACK PAIN, UNSPECIFIED BACK PAIN LATERALITY, UNSPECIFIED CHRONICITY: Primary | ICD-10-CM

## 2024-11-25 LAB — VITAMIN B1 WHOLE BLD: 57.9 NMOL/L

## 2024-11-26 DIAGNOSIS — E66.3 OVERWEIGHT (BMI 25.0-29.9): ICD-10-CM

## 2024-11-26 DIAGNOSIS — I10 ESSENTIAL HYPERTENSION: ICD-10-CM

## 2024-11-26 DIAGNOSIS — Z90.3 HISTORY OF SLEEVE GASTRECTOMY: ICD-10-CM

## 2024-11-26 DIAGNOSIS — E51.9 VITAMIN B1 DEFICIENCY: Primary | ICD-10-CM

## 2024-11-26 DIAGNOSIS — E78.00 HYPERCHOLESTEROLEMIA: ICD-10-CM

## 2024-11-26 DIAGNOSIS — K21.9 GASTROESOPHAGEAL REFLUX DISEASE, UNSPECIFIED WHETHER ESOPHAGITIS PRESENT: ICD-10-CM

## 2024-11-27 ENCOUNTER — PATIENT MESSAGE (OUTPATIENT)
Dept: INTERNAL MEDICINE CLINIC | Facility: CLINIC | Age: 59
End: 2024-11-27

## 2024-12-02 ENCOUNTER — CLINICAL ABSTRACT (OUTPATIENT)
Dept: INTERNAL MEDICINE | Age: 59
End: 2024-12-02

## 2024-12-03 ENCOUNTER — TELEPHONE (OUTPATIENT)
Dept: INTERNAL MEDICINE CLINIC | Facility: CLINIC | Age: 59
End: 2024-12-03

## 2024-12-03 ENCOUNTER — NURSE ONLY (OUTPATIENT)
Dept: INTERNAL MEDICINE CLINIC | Facility: CLINIC | Age: 59
End: 2024-12-03
Payer: COMMERCIAL

## 2024-12-03 DIAGNOSIS — E51.9 THIAMINE DEFICIENCY: Primary | ICD-10-CM

## 2024-12-03 PROCEDURE — 96372 THER/PROPH/DIAG INJ SC/IM: CPT | Performed by: NURSE PRACTITIONER

## 2024-12-03 RX ORDER — THIAMINE HYDROCHLORIDE 100 MG/ML
100 INJECTION, SOLUTION INTRAMUSCULAR; INTRAVENOUS ONCE
Status: COMPLETED | OUTPATIENT
Start: 2024-12-03 | End: 2024-12-03

## 2024-12-03 RX ADMIN — THIAMINE HYDROCHLORIDE 100 MG: 100 INJECTION, SOLUTION INTRAMUSCULAR; INTRAVENOUS at 10:48:00

## 2024-12-03 NOTE — TELEPHONE ENCOUNTER
Anson Tavarez, you can disregard that message for [id number], she's getting a vitamin B 1 injection, not B 12, they have her scheduled to get a B 12 injection.

## 2024-12-03 NOTE — TELEPHONE ENCOUNTER
Anson Tavarez, your pt [id number] is schedule for a nurse visit today for B 12 injection, but she had B 12 labs done in July ordered by a different provider, and I see that you ordered a vitamin B 1 lab,, but she hasn't completed it yet, and I didn't see anything in your notes about a B 12 injection, I want your input on what to do for this B 12.    Thank you    Briana MADRID

## 2024-12-04 ENCOUNTER — APPOINTMENT (OUTPATIENT)
Dept: PHYSICAL THERAPY | Age: 59
End: 2024-12-04
Attending: INTERNAL MEDICINE

## 2024-12-04 DIAGNOSIS — M54.6 THORACIC BACK PAIN, UNSPECIFIED BACK PAIN LATERALITY, UNSPECIFIED CHRONICITY: Primary | ICD-10-CM

## 2024-12-04 PROCEDURE — 97112 NEUROMUSCULAR REEDUCATION: CPT

## 2024-12-04 PROCEDURE — 97161 PT EVAL LOW COMPLEX 20 MIN: CPT

## 2024-12-04 ASSESSMENT — ENCOUNTER SYMPTOMS
ALLEVIATING FACTORS: RELAXATION TECHNIQUES
PAIN FREQUENCY: INTERMITTENT
ALLEVIATING FACTORS: CHANGE IN POSITION
ALLEVIATING FACTOR: SEE NARRATIVE FOR MORE DETAILS
QUALITY: SHARP
ALLEVIATING FACTORS: HEAT
ALLEVIATING FACTORS: PRESCRIPTION MEDICATIONS
ALLEVIATING FACTORS: SUPPORTIVE DEVICE
ALLEVIATING FACTORS: REST
ALLEVIATING FACTORS: ICE
ALLEVIATING FACTORS: MASSAGE
QUALITY: STIFF
ALLEVIATING FACTORS: AVOIDING MOVEMENT IN INVOLVED AREA
PAIN DESCRIPTION: SEE NARRATIVE FOR MORE DETAILS
QUALITY: SORE
QUALITY: ACHE

## 2024-12-08 NOTE — TELEPHONE ENCOUNTER
I have not prescribed Vitamin B12 and this lab is normal. Patient is in need of Vitamin B1 injection. Please see lab results and RN documentation regarding communication to patient. Did she get her Vitamin B1 injection? Thank you.

## 2024-12-08 NOTE — TELEPHONE ENCOUNTER
Patient did get B1 injection  Administrations This Visit    thiamine 100 mg/mL injection 100 mg    Admin Date  12/03/2024 Action  Given Dose  100 mg Route  Intramuscular Site  Left Deltoid Documented By  Briana Gunderson MA   Billing Information    Medication NDC Lot# Expiration Date  Admin Qty Waste Qty Pack Size Billing Code   thiamine 100 mg/mL Soln 06349-194-71 [id number] [facility]   2 mL [] INJECTION, THIAMINE HCL, 100 MG

## 2024-12-09 ENCOUNTER — APPOINTMENT (OUTPATIENT)
Dept: PHYSICAL THERAPY | Age: 59
End: 2024-12-09

## 2024-12-09 DIAGNOSIS — M54.50 ACUTE BILATERAL LOW BACK PAIN WITHOUT SCIATICA: ICD-10-CM

## 2024-12-09 DIAGNOSIS — M54.6 ACUTE MIDLINE THORACIC BACK PAIN: Primary | ICD-10-CM

## 2024-12-09 PROCEDURE — 97140 MANUAL THERAPY 1/> REGIONS: CPT

## 2024-12-09 PROCEDURE — 97112 NEUROMUSCULAR REEDUCATION: CPT

## 2024-12-16 ENCOUNTER — APPOINTMENT (OUTPATIENT)
Dept: PHYSICAL THERAPY | Age: 59
End: 2024-12-16

## 2024-12-16 ENCOUNTER — TELEPHONE (OUTPATIENT)
Dept: SURGERY | Facility: CLINIC | Age: 59
End: 2024-12-16

## 2024-12-16 DIAGNOSIS — M54.50 ACUTE BILATERAL LOW BACK PAIN WITHOUT SCIATICA: ICD-10-CM

## 2024-12-16 DIAGNOSIS — M54.6 ACUTE MIDLINE THORACIC BACK PAIN: Primary | ICD-10-CM

## 2024-12-16 PROCEDURE — 97112 NEUROMUSCULAR REEDUCATION: CPT

## 2024-12-16 PROCEDURE — 97140 MANUAL THERAPY 1/> REGIONS: CPT

## 2024-12-16 NOTE — PROGRESS NOTES
[facility]  [address]  [address]  [facility] [address]  Dept: [phone]  Loc: [phone]    12/16/24    Bariatric Follow-up Nutrition Session  Sindy Torres is a 59 year old female.     Assessment   Procedure:  Vertical Sleeve Gastrectomy  Here for medical weight management: yes  Surgery Date:  5/17/24  Medical Diagnosis:  overweight    Labs:  Lab Results   Component Value Date    GLU 64 (A) 11/19/2024    BUN 9 05/24/2022    BUNCREA 18.6 05/18/2021    CREATSERUM 0.71 05/24/2022    ANIONGAP 6 05/24/2022    GFRNAA 96 05/24/2022    GFRAA 111 05/24/2022    CA 9.2 05/24/2022    OSMOCALC 289 05/24/2022    ALKPHO 65 05/24/2022    AST 13 (L) 05/24/2022    ALT 20 05/24/2022    BILT 0.4 05/24/2022    TP 6.7 05/24/2022    ALB 3.4 05/24/2022    GLOBULIN 3.3 05/24/2022     05/24/2022    K 3.8 05/24/2022     05/24/2022    CO2 25.0 05/24/2022     Lab Results   Component Value Date    MG 2.0 08/26/2021      Phosphorus (mg/dL)   Date Value   08/26/2021 3.7       Thyroid:    Lab Results   Component Value Date    TSH 1.280 05/24/2022    TSH 1.940 08/26/2021    T4F 0.8 05/24/2022    T4F 0.9 08/26/2021       Iron Panel:  Lab Results   Component Value Date    IRON 58 05/24/2022    TRANSFERRIN 235 05/24/2022    TIBCP 350 05/24/2022    SAT 17 05/24/2022       CBC:  Lab Results   Component Value Date    WBC 6.3 05/24/2022    WBC 5.8 08/26/2021    WBC 8.1 05/18/2021     Lab Results   Component Value Date    HGB 11.7 (L) 05/24/2022    HGB 12.0 08/26/2021    HGB 10.6 (L) 05/18/2021      Lab Results   Component Value Date    .0 05/24/2022    .0 08/26/2021    .0 05/18/2021       Diabetes:    Lab Results   Component Value Date     08/26/2021    A1C 5.5 08/26/2021       Lipid Panel:  Lab Results   Component Value Date    CHOLEST 151 11/19/2024    TRIG 45 11/19/2024    HDL 75 (A) 11/19/2024     11/19/2024    VLDL 12 08/26/2021     NONHDLC 136 (H) 08/26/2021    CHOLHDLRATIO 2 11/19/2024        Vitamins/Minerals:  Lab Results   Component Value Date    B12 1,491 (H) 05/24/2022     Lab Results   Component Value Date    VITD 61.6 05/24/2022     Lab Results   Component Value Date/Time    THIAMINE 143 05/24/2022 11:14 AM      Lab Results   Component Value Date/Time    VITB1 57.9 (L) 11/19/2024 06:34 AM     Lab Results   Component Value Date/Time    FOLIC >100.0 05/24/2022 11:14 AM        Meds:     Current Outpatient Medications:     tiZANidine 2 MG Oral Tab, Take 1 tablet (2 mg total) by mouth nightly as needed., Disp: , Rfl:     FLUoxetine 20 MG Oral Cap, Take 1 capsule (20 mg total) by mouth daily., Disp: 30 capsule, Rfl: 3    semaglutide-weight management 1.7 MG/0.75ML Subcutaneous Solution Auto-injector, Inject 0.75 mL (1.7 mg total) into the skin once a week., Disp: 9 mL, Rfl: 1    butalbital-acetaminophen-caffeine -40 MG Oral Tab, Take 1 tablet by mouth every 6 (six) hours as needed., Disp: , Rfl:     cetirizine 10 MG Oral Tab, Take 1 tablet (10 mg total) by mouth As Directed., Disp: , Rfl:     lisinopril 10 MG Oral Tab, Take 1 tablet (10 mg total) by mouth daily., Disp: , Rfl:     rosuvastatin 10 MG Oral Tab, , Disp: , Rfl:     Pantoprazole Sodium 40 MG Oral Tab EC, Take 1 tablet (40 mg total) by mouth every morning before breakfast., Disp: 90 tablet, Rfl: 0    Multiple Vitamins-Minerals (BARIATRIC FUSION) Oral Chew Tab, Chew by mouth., Disp: , Rfl:     Surgery Date: 5/17/21  Surgery Weight: 274 lbs  Weight change:  122 lbs  IBW: 145 lbs  EBWL: 129 lbs  Post-Op Excess Body Weight Loss: 94% EBWL  BMI: Body mass index is 25.29 kg/m².    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 Would like to reach a goal weight of 150 lbs. Today's Ht 5' 5\" (1.651 m)   Wt 152 lb (68.9 kg)   LMP  (LMP Unknown)   BMI 25.29 kg/m²  indicating a 115 lbs weight loss since starting the [facility].     Weight:    Wt Readings from Last 6 Encounters:   11/19/24 154 lb (69.9 kg)   09/04/24 167 lb (75.8 kg)   09/03/24 167 lb (75.8 kg)   07/02/24 176 lb (79.8 kg)   03/28/24 195 lb (88.5 kg)   01/22/24 203 lb (92.1 kg)       Diet History:  Patient seen in clinic today for nutrition counseling 3 years s/p VSG with 5/17/21.  Reports certain challenges/barriers associated with weight loss. Specifically reports she is at her goal weight. S/P cholecystectomy with no malabsorption issues reported. Her portions at meals are 1/4-1/2 cups and it takes her 15 minutes to eat a meal. Patient is taking small bites (dime sized) and is chewing each bite 15-40 x before swallowing. Patient takes small sips and is not  her beverages from her meals. Patient has eliminated caffeine, alcohol, and carbonated beverages. Patient has stopped using straws and chew gum. Upon medication review discussed cetirizine (Zyrtec) which can lead to weight gain by blocking histamine which can increase appetite and interfere with the brain getting the full signal, leading to overeating and increased sleepiness can lead to fewer calories burned throughout the day. Discussed typical diet which consists of 3 meals and 1 snacks/day. She is not currently tracking her meals. She is aiming for <100 grams CHO/day. She has noticed a decrease in appetite and cravings since starting Wegovy. No adverse SE reported. Her  is responsible for grocery shopping and cooking. She is eating out rarely. No food log provided; verbal dietary recall listed below.    Typical Diet:  Breakfast: chicken sausage with berries OR SKIPS  Snack: SKIPS  Lunch:  chicken soup (avoids noodles) OR 1/2 turkey and cheese sandwich OR leftovers  Snack: 1/8 lemon crunch cake  Dinner: 1/4 c rice with 1 oz beef and vegetable kabobs  Snack: SKIPS    Beverages: 40-60 oz water/day, tea  Alcoholic Beverages: 2 oz white wine/week      Patient has made some modifications and adjustments to diet: yes, see above  Food Allergies: NKFA  Food intolerances: lettuce, carrots, celery - will vomit  Trigger Foods: no  Vitamin/mineral supplements: Bariatric Barimelts  Protein supplements:  make her sick     Exercise:   1 x week + walking pad or elliptical 1 x hour. Wants to restart water aerobics     Body Composition Analysis #2  1. Weight 152.7 lbs  2. BMI 24.7  3. BMR 1389 kcal  4. Percent body fat 31.9% (48.7 lbs)  5. Visceral fat level 10 (goal is <10)  6. ECW/TBW 0.405  7. SMM (skeletal muscle mass) 54.7 lbs               Lean body mass for arms (R & L) 4.76 lbs, 93.4% & 4.87 lbs, 95.7%               Fat body mass for arms (R & L) 3.5 lbs & 3.5 lbs               Lean body mass for trunk 42.4 lbs, 92.0%    Fat body mass for trunk 23.6 lbs               Lean body mass for legs (R & L) 15.94 lbs, 99.2% & 15.68 lbs, 97.6%    Fat body mass for legs (R & L) 7.9 lbs & 7.9 lbs  8. Body fat mass 48.7 lbs   9. Recommended Body fat amount loss 17.6 lb  10. Recommendations/Status adding allover resistance/strength training to increase muscle mass and BMR.        Body Composition Analysis #1 (2/11/21)  Body Composition Analysis done today, has good upper body and trunk muscle mass at 114.4% for the right arm, 114.4% for the left arm, and 105% for the trunk. Pt w/ poor lower body muscle mass at 86.7% for the right leg and 88.6% for the left leg. Pt w/ BMR of 1633 kcal per day and body fat percentage of 54.3%. Encouraged pt to continue resistance training to increase muscle mass in lower body.   Nutrition Diagnosis     Nutrition Diagnosis:  healthy weight     Intervention   Education:  Review  of Food Intake (pt provided food log/journal - no)  Importance of keeping a food log  Discussion of food modifications to current diet (see GOALS)  Discussion of Liquid Protein diet 2 weeks before surgery  Discussion of diet progression stages 1-4 s/p surgery  Discussed ideas for breakfast. Lunch, dinner and snacks  Basic nutrition education:  Discussion of food groups and what constitutes a serving  Discussion of number of servings  in each food group to have per meal or snack  Discussion of high fiber vs refined carbs; use of low-fat protein; saturates vs unsaturated fats  Importance of eating consistently and not skipping meals  Importance of protein for satiety  Discussed the importance of meal planning  Discussion of need for exercise for weight loss (see GOALS)    Monitor/Evaluate   Goals:  Keep a food log (My Net Diary, [employer]) as an effective tool to help track food choices  Focus on healthy plate; 1/2 plate vegetables, 1/4 plate protein, 1/4 plate carbohydrates  Avoid skipping meals - eat every 3-4 hours  Start each meal with protein first  S/P VSG 5/17/21: Aim for  grams protein/day or 20-30 grams protein/meal  Quick convenient protein options: tuna, deli meat (turkey, chicken, roast beef), pre-cooked shrimp or chicken, greek yogurt, cottage cheese, protein shakes  When choosing yogurt aim for option consisting of 10-15 grams protein and  calories/serving  High protein yogurt examples: Siggi;s, Nepali, Two Good, Oikos Triple Zero, Dannon light and fit greek, YQ by Yoplait, Fage, Chobani Less Sugar  Aim for <100 grams carbohydrates/day   Aim for 64 oz of water/day  Eat SLOWLY. Meal should take 20-30 minutes to even though you are eating very small amounts   Cut each bite into dime sized pieces  Chew each bite 20-30 x before swallowing  Do not eat and drink at the same time. Do not drink for 30 minutes before or after a meal  Avoid carbonation, straws, and chewing gum  Eliminate caffeine  for at least 3 months and alcohol for at least 1 year after bariatric surgery  Take bariatric vitamins as recommended  Meal plan ahead of time  Use measuring cups and/or food scale for portion control    Exercise:  Stay active - focus on realistic activities that fit into your schedule  Plan ahead of exercise - treat as an appointment  When getting started with exercise, slowly build up duration and intensity  Aim for 30 minutes or more of moderate to vigorous exercise 5 x week  Incorporate strength training 3 x week for at least 10 minutes    Behavior Modification:  Set small goals for self and focus on realistic, attainable changes to work on long-term.    Visit Length: 8:20 am to 9:34 am - 74 minutes

## 2024-12-18 ENCOUNTER — OFFICE VISIT (OUTPATIENT)
Dept: SURGERY | Facility: CLINIC | Age: 59
End: 2024-12-18
Payer: COMMERCIAL

## 2024-12-18 VITALS — WEIGHT: 152 LBS | BODY MASS INDEX: 25.33 KG/M2 | HEIGHT: 65 IN

## 2024-12-18 DIAGNOSIS — E66.3 OVERWEIGHT (BMI 25.0-29.9): Primary | ICD-10-CM

## 2024-12-18 DIAGNOSIS — E78.00 HYPERCHOLESTEROLEMIA: ICD-10-CM

## 2024-12-18 DIAGNOSIS — Z90.3 HISTORY OF SLEEVE GASTRECTOMY: ICD-10-CM

## 2024-12-18 DIAGNOSIS — I10 ESSENTIAL HYPERTENSION: ICD-10-CM

## 2024-12-18 DIAGNOSIS — Z90.49 HISTORY OF CHOLECYSTECTOMY: ICD-10-CM

## 2024-12-18 PROCEDURE — 0358T BIA WHOLE BODY: CPT | Performed by: DIETITIAN, REGISTERED

## 2024-12-18 PROCEDURE — 97803 MED NUTRITION INDIV SUBSEQ: CPT | Performed by: DIETITIAN, REGISTERED

## 2024-12-18 PROCEDURE — 3008F BODY MASS INDEX DOCD: CPT | Performed by: DIETITIAN, REGISTERED

## 2024-12-18 NOTE — PATIENT INSTRUCTIONS
It was so nice meeting you today. Please reach out with any questions or concerns @ [email or web address]. Thank you for including me in your weight loss journey. Here's a review of today's visit:    Body Composition Analysis #2  1. Weight 152.7 lbs  2. BMI 24.7  3. BMR 1389 kcal  4. Percent body fat 31.9% (48.7 lbs)  5. Visceral fat level 10 (goal is <10)  6. ECW/TBW 0.405  7. SMM (skeletal muscle mass) 54.7 lbs               Lean body mass for arms (R & L) 4.76 lbs, 93.4% & 4.87 lbs, 95.7%               Fat body mass for arms (R & L) 3.5 lbs & 3.5 lbs               Lean body mass for trunk 42.4 lbs, 92.0%    Fat body mass for trunk 23.6 lbs               Lean body mass for legs (R & L) 15.94 lbs, 99.2% & 15.68 lbs, 97.6%    Fat body mass for legs (R & L) 7.9 lbs & 7.9 lbs  8. Body fat mass 48.7 lbs   9. Recommended Body fat amount loss 17.6 lb  10. Recommendations/Status adding allover resistance/strength training to increase muscle mass and BMR.        Body Composition Analysis #1 (2/11/21)  Body Composition Analysis done today, has good upper body and trunk muscle mass at 114.4% for the right arm, 114.4% for the left arm, and 105% for the trunk. Pt w/ poor lower body muscle mass at 86.7% for the right leg and 88.6% for the left leg. Pt w/ BMR of 1633 kcal per day and body fat percentage of 54.3%. Encouraged pt to continue resistance training to increase muscle mass in lower body.     Goals:  Keep a food log (My Net Diary, [employer]) as an effective tool to help track food choices  Focus on healthy plate; 1/2 plate vegetables, 1/4 plate protein, 1/4 plate carbohydrates  Avoid skipping meals - eat every 3-4 hours  Start each meal with protein first  S/P VSG 5/17/21: Aim for  grams protein/day or 20-30 grams protein/meal  Quick convenient protein options: tuna, deli meat (turkey, chicken, roast beef), pre-cooked shrimp or chicken, greek yogurt, cottage cheese, protein shakes  When choosing yogurt aim for  option consisting of 10-15 grams protein and  calories/serving  High protein yogurt examples: Siggi;s, Prydeinig, Two Good, Oikos Triple Zero, Dannon light and fit greek, YQ by Boaz Mary Chobani Less Sugar  Aim for <100 grams carbohydrates/day   Aim for 64 oz of water/day  Eat SLOWLY. Meal should take 20-30 minutes to even though you are eating very small amounts   Cut each bite into dime sized pieces  Chew each bite 20-30 x before swallowing  Do not eat and drink at the same time. Do not drink for 30 minutes before or after a meal  Avoid carbonation, straws, and chewing gum  Eliminate caffeine for at least 3 months and alcohol for at least 1 year after bariatric surgery  Take bariatric vitamins as recommended  Meal plan ahead of time  Use measuring cups and/or food scale for portion control    Exercise:  Stay active - focus on realistic activities that fit into your schedule  Plan ahead of exercise - treat as an appointment  When getting started with exercise, slowly build up duration and intensity  Aim for 30 minutes or more of moderate to vigorous exercise 5 x week  Incorporate strength training 3 x week for at least 10 minutes    Behavior Modification:  Set small goals for self and focus on realistic, attainable changes to work on long-term.

## 2024-12-23 RX ORDER — LISINOPRIL 10 MG/1
10 TABLET ORAL DAILY
Qty: 90 TABLET | Refills: 0 | Status: SHIPPED | OUTPATIENT
Start: 2024-12-23

## 2024-12-26 ENCOUNTER — APPOINTMENT (OUTPATIENT)
Dept: PHYSICAL THERAPY | Age: 59
End: 2024-12-26

## 2024-12-26 ENCOUNTER — LAB ENCOUNTER (OUTPATIENT)
Dept: LAB | Age: 59
End: 2024-12-26
Attending: NURSE PRACTITIONER
Payer: COMMERCIAL

## 2024-12-26 DIAGNOSIS — M54.50 ACUTE BILATERAL LOW BACK PAIN WITHOUT SCIATICA: ICD-10-CM

## 2024-12-26 DIAGNOSIS — E51.9 VITAMIN B1 DEFICIENCY: ICD-10-CM

## 2024-12-26 DIAGNOSIS — M54.6 ACUTE MIDLINE THORACIC BACK PAIN: Primary | ICD-10-CM

## 2024-12-26 PROCEDURE — 97112 NEUROMUSCULAR REEDUCATION: CPT

## 2024-12-26 PROCEDURE — 97014 ELECTRIC STIMULATION THERAPY: CPT

## 2024-12-26 PROCEDURE — 84425 ASSAY OF VITAMIN B-1: CPT | Performed by: NURSE PRACTITIONER

## 2024-12-30 ENCOUNTER — TELEPHONE (OUTPATIENT)
Dept: INTERNAL MEDICINE CLINIC | Facility: CLINIC | Age: 59
End: 2024-12-30

## 2024-12-30 LAB — VITAMIN B1 WHOLE BLD: 97.9 NMOL/L

## 2024-12-30 NOTE — TELEPHONE ENCOUNTER
PA was completed in [facility] for Wegovy today.    Key: [id number]

## 2025-01-04 DIAGNOSIS — G43.009 MIGRAINE WITHOUT AURA AND WITHOUT STATUS MIGRAINOSUS, NOT INTRACTABLE: ICD-10-CM

## 2025-01-06 ENCOUNTER — TELEPHONE (OUTPATIENT)
Dept: INTERNAL MEDICINE CLINIC | Facility: CLINIC | Age: 60
End: 2025-01-06

## 2025-01-06 RX ORDER — BUTALBITAL, ACETAMINOPHEN AND CAFFEINE 50; 325; 40 MG/1; MG/1; MG/1
1 TABLET ORAL EVERY 6 HOURS PRN
Qty: 30 TABLET | Refills: 0 | Status: SHIPPED | OUTPATIENT
Start: 2025-01-06

## 2025-01-07 ENCOUNTER — APPOINTMENT (OUTPATIENT)
Dept: PHYSICAL THERAPY | Age: 60
End: 2025-01-07

## 2025-01-07 ENCOUNTER — NURSE ONLY (OUTPATIENT)
Dept: INTERNAL MEDICINE CLINIC | Facility: CLINIC | Age: 60
End: 2025-01-07
Payer: COMMERCIAL

## 2025-01-07 DIAGNOSIS — M54.6 ACUTE MIDLINE THORACIC BACK PAIN: Primary | ICD-10-CM

## 2025-01-07 DIAGNOSIS — M54.50 ACUTE BILATERAL LOW BACK PAIN WITHOUT SCIATICA: ICD-10-CM

## 2025-01-07 DIAGNOSIS — E51.9 THIAMINE DEFICIENCY: Primary | ICD-10-CM

## 2025-01-07 PROCEDURE — 96372 THER/PROPH/DIAG INJ SC/IM: CPT | Performed by: NURSE PRACTITIONER

## 2025-01-07 PROCEDURE — 97112 NEUROMUSCULAR REEDUCATION: CPT

## 2025-01-07 RX ORDER — THIAMINE HYDROCHLORIDE 100 MG/ML
100 INJECTION, SOLUTION INTRAMUSCULAR; INTRAVENOUS ONCE
Status: COMPLETED | OUTPATIENT
Start: 2025-01-07 | End: 2025-01-07

## 2025-01-07 RX ORDER — CYANOCOBALAMIN 1000 UG/ML
1000 INJECTION, SOLUTION INTRAMUSCULAR; SUBCUTANEOUS ONCE
Status: DISCONTINUED | OUTPATIENT
Start: 2025-01-07 | End: 2025-01-07

## 2025-01-07 RX ADMIN — THIAMINE HYDROCHLORIDE 100 MG: 100 INJECTION, SOLUTION INTRAMUSCULAR; INTRAVENOUS at 11:50:00

## 2025-01-14 ENCOUNTER — LAB SERVICES (OUTPATIENT)
Dept: LAB | Age: 60
End: 2025-01-14

## 2025-01-14 ENCOUNTER — APPOINTMENT (OUTPATIENT)
Dept: PHYSICAL THERAPY | Age: 60
End: 2025-01-14

## 2025-01-14 DIAGNOSIS — M54.50 ACUTE BILATERAL LOW BACK PAIN WITHOUT SCIATICA: ICD-10-CM

## 2025-01-14 DIAGNOSIS — M54.6 ACUTE MIDLINE THORACIC BACK PAIN: Primary | ICD-10-CM

## 2025-01-14 DIAGNOSIS — E78.2 HYPERLIPIDEMIA, MIXED: ICD-10-CM

## 2025-01-14 LAB
ALBUMIN SERPL-MCNC: 3.9 G/DL (ref 3.4–5)
ALBUMIN/GLOB SERPL: 1.4 {RATIO} (ref 1–2.4)
ALP SERPL-CCNC: 51 UNITS/L (ref 45–117)
ALT SERPL-CCNC: 16 UNITS/L
ANION GAP SERPL CALC-SCNC: 12 MMOL/L (ref 7–19)
AST SERPL-CCNC: 14 UNITS/L
BILIRUB SERPL-MCNC: 0.4 MG/DL (ref 0.2–1)
BUN SERPL-MCNC: 14 MG/DL (ref 6–20)
BUN/CREAT SERPL: 16 (ref 7–25)
CALCIUM SERPL-MCNC: 9.5 MG/DL (ref 8.4–10.2)
CHLORIDE SERPL-SCNC: 106 MMOL/L (ref 97–110)
CHOLEST SERPL-MCNC: 164 MG/DL
CHOLEST/HDLC SERPL: 1.9 {RATIO}
CO2 SERPL-SCNC: 29 MMOL/L (ref 21–32)
CREAT SERPL-MCNC: 0.9 MG/DL (ref 0.51–0.95)
EGFRCR SERPLBLD CKD-EPI 2021: 74 ML/MIN/{1.73_M2}
FASTING DURATION TIME PATIENT: NORMAL H
GLOBULIN SER-MCNC: 2.8 G/DL (ref 2–4)
GLUCOSE SERPL-MCNC: 74 MG/DL (ref 70–99)
HDLC SERPL-MCNC: 87 MG/DL
LDLC SERPL CALC-MCNC: 69 MG/DL
NONHDLC SERPL-MCNC: 77 MG/DL
POTASSIUM SERPL-SCNC: 3.7 MMOL/L (ref 3.4–5.1)
PROT SERPL-MCNC: 6.7 G/DL (ref 6.4–8.2)
SODIUM SERPL-SCNC: 143 MMOL/L (ref 135–145)
TRIGL SERPL-MCNC: 40 MG/DL

## 2025-01-14 PROCEDURE — 36415 COLL VENOUS BLD VENIPUNCTURE: CPT | Performed by: INTERNAL MEDICINE

## 2025-01-14 PROCEDURE — 80061 LIPID PANEL: CPT | Performed by: INTERNAL MEDICINE

## 2025-01-14 PROCEDURE — 80053 COMPREHEN METABOLIC PANEL: CPT | Performed by: INTERNAL MEDICINE

## 2025-01-14 PROCEDURE — 97112 NEUROMUSCULAR REEDUCATION: CPT

## 2025-01-15 DIAGNOSIS — E78.2 HYPERLIPIDEMIA, MIXED: Primary | ICD-10-CM

## 2025-01-17 DIAGNOSIS — F43.9 STRESS: ICD-10-CM

## 2025-01-17 DIAGNOSIS — Z51.81 ENCOUNTER FOR THERAPEUTIC DRUG MONITORING: ICD-10-CM

## 2025-01-17 DIAGNOSIS — E66.3 OVERWEIGHT (BMI 25.0-29.9): ICD-10-CM

## 2025-01-20 NOTE — TELEPHONE ENCOUNTER
Requesting fluoxetine      Future Appointments   Date Time Provider Department Center   1/28/2025  4:30 PM Michelle Benitez APRN [id number]   2/4/2025 10:00 AM [facility] NURSE [facility]   2/6/2025  8:40 AM Daysi Mendez APRN [facility]   5/15/2025  8:40 AM Daysi Mendez APRN [facility] [address]   5/23/2025  8:30 AM Kina Dhillon RD [id number]   9/4/2025  8:40 AM Daysi Mendez APRN [facility] [address]     Denied per LOV note    Medication Plan: Continue Wegovy at current dose. Reduce Fluoxetine to 20 mg daily for 2 weeks and then can discontinue. Monitor mood over the next month after discontinuing Fluoxetine. Continue bariatric vitamin.     Patient no longer taking this medication

## 2025-01-21 ENCOUNTER — APPOINTMENT (OUTPATIENT)
Dept: GASTROENTEROLOGY | Age: 60
End: 2025-01-21

## 2025-01-21 ENCOUNTER — TELEPHONE (OUTPATIENT)
Dept: GASTROENTEROLOGY | Age: 60
End: 2025-01-21

## 2025-01-21 ENCOUNTER — E-ADVICE (OUTPATIENT)
Dept: GASTROENTEROLOGY | Age: 60
End: 2025-01-21

## 2025-01-21 DIAGNOSIS — Z98.84 HISTORY OF BARIATRIC SURGERY: ICD-10-CM

## 2025-01-21 DIAGNOSIS — R11.2 NAUSEA AND VOMITING, UNSPECIFIED VOMITING TYPE: Primary | ICD-10-CM

## 2025-01-21 DIAGNOSIS — Z87.19 HISTORY OF REPAIR OF HIATAL HERNIA: ICD-10-CM

## 2025-01-21 DIAGNOSIS — K22.70 BARRETT'S ESOPHAGUS WITHOUT DYSPLASIA: Primary | ICD-10-CM

## 2025-01-21 DIAGNOSIS — K22.70 BARRETT'S ESOPHAGUS WITHOUT DYSPLASIA: ICD-10-CM

## 2025-01-21 DIAGNOSIS — Z86.0100 HISTORY OF COLONIC POLYPS: ICD-10-CM

## 2025-01-21 DIAGNOSIS — K21.9 GERD WITHOUT ESOPHAGITIS: ICD-10-CM

## 2025-01-21 DIAGNOSIS — Z98.890 HISTORY OF REPAIR OF HIATAL HERNIA: ICD-10-CM

## 2025-01-21 DIAGNOSIS — R19.5 LOOSE STOOLS: ICD-10-CM

## 2025-01-21 PROCEDURE — 99204 OFFICE O/P NEW MOD 45 MIN: CPT

## 2025-01-21 RX ORDER — ONDANSETRON 4 MG/1
4 TABLET, ORALLY DISINTEGRATING ORAL EVERY 8 HOURS PRN
Qty: 30 TABLET | Refills: 0 | Status: SHIPPED | OUTPATIENT
Start: 2025-01-21

## 2025-01-22 ENCOUNTER — APPOINTMENT (OUTPATIENT)
Dept: LAB | Age: 60
End: 2025-01-22

## 2025-01-22 ENCOUNTER — APPOINTMENT (OUTPATIENT)
Dept: PHYSICAL THERAPY | Age: 60
End: 2025-01-22

## 2025-01-22 DIAGNOSIS — M54.50 ACUTE BILATERAL LOW BACK PAIN WITHOUT SCIATICA: ICD-10-CM

## 2025-01-22 DIAGNOSIS — M54.6 ACUTE MIDLINE THORACIC BACK PAIN: Primary | ICD-10-CM

## 2025-01-22 PROCEDURE — 97112 NEUROMUSCULAR REEDUCATION: CPT

## 2025-01-22 PROCEDURE — 97140 MANUAL THERAPY 1/> REGIONS: CPT

## 2025-01-28 SDOH — ECONOMIC STABILITY: FOOD INSECURITY: WITHIN THE PAST 12 MONTHS, THE FOOD YOU BOUGHT JUST DIDN'T LAST AND YOU DIDN'T HAVE MONEY TO GET MORE.: NEVER TRUE

## 2025-01-28 SDOH — ECONOMIC STABILITY: HOUSING INSECURITY: DO YOU HAVE PROBLEMS WITH ANY OF THE FOLLOWING?: NONE OF THE ABOVE

## 2025-01-28 SDOH — ECONOMIC STABILITY: GENERAL: WOULD YOU LIKE HELP WITH ANY OF THE FOLLOWING NEEDS?: I DON'T WANT HELP WITH ANY OF THESE

## 2025-01-28 SDOH — ECONOMIC STABILITY: TRANSPORTATION INSECURITY
IN THE PAST 12 MONTHS, HAS LACK OF RELIABLE TRANSPORTATION KEPT YOU FROM MEDICAL APPOINTMENTS, MEETINGS, WORK OR FROM GETTING THINGS NEEDED FOR DAILY LIVING?: YES

## 2025-01-28 SDOH — ECONOMIC STABILITY: HOUSING INSECURITY: WHAT IS YOUR LIVING SITUATION TODAY?: I HAVE A STEADY PLACE TO LIVE

## 2025-01-28 ASSESSMENT — SOCIAL DETERMINANTS OF HEALTH (SDOH): IN THE PAST 12 MONTHS, HAS THE ELECTRIC, GAS, OIL, OR WATER COMPANY THREATENED TO SHUT OFF SERVICE IN YOUR HOME?: NO

## 2025-01-30 ENCOUNTER — APPOINTMENT (OUTPATIENT)
Dept: PHYSICAL THERAPY | Age: 60
End: 2025-01-30

## 2025-01-30 DIAGNOSIS — M54.6 ACUTE MIDLINE THORACIC BACK PAIN: Primary | ICD-10-CM

## 2025-01-30 DIAGNOSIS — M54.50 ACUTE BILATERAL LOW BACK PAIN WITHOUT SCIATICA: ICD-10-CM

## 2025-01-30 PROCEDURE — 97140 MANUAL THERAPY 1/> REGIONS: CPT

## 2025-01-30 PROCEDURE — 97112 NEUROMUSCULAR REEDUCATION: CPT

## 2025-01-31 ENCOUNTER — IMAGING SERVICES (OUTPATIENT)
Dept: GENERAL RADIOLOGY | Age: 60
End: 2025-01-31
Attending: INTERNAL MEDICINE

## 2025-01-31 ENCOUNTER — APPOINTMENT (OUTPATIENT)
Dept: INTERNAL MEDICINE | Age: 60
End: 2025-01-31

## 2025-01-31 ENCOUNTER — LAB SERVICES (OUTPATIENT)
Dept: LAB | Age: 60
End: 2025-01-31

## 2025-01-31 VITALS
WEIGHT: 144 LBS | HEIGHT: 66 IN | DIASTOLIC BLOOD PRESSURE: 76 MMHG | OXYGEN SATURATION: 99 % | TEMPERATURE: 97.5 F | BODY MASS INDEX: 23.14 KG/M2 | SYSTOLIC BLOOD PRESSURE: 118 MMHG | HEART RATE: 58 BPM | RESPIRATION RATE: 18 BRPM

## 2025-01-31 DIAGNOSIS — M54.12 CERVICAL RADICULOPATHY: ICD-10-CM

## 2025-01-31 DIAGNOSIS — M54.2 NECK PAIN ON RIGHT SIDE: ICD-10-CM

## 2025-01-31 DIAGNOSIS — R63.4 WEIGHT LOSS: ICD-10-CM

## 2025-01-31 DIAGNOSIS — Z12.31 ENCOUNTER FOR SCREENING MAMMOGRAM FOR MALIGNANT NEOPLASM OF BREAST: ICD-10-CM

## 2025-01-31 DIAGNOSIS — R11.2 NAUSEA VOMITING AND DIARRHEA: ICD-10-CM

## 2025-01-31 DIAGNOSIS — R19.7 NAUSEA VOMITING AND DIARRHEA: ICD-10-CM

## 2025-01-31 DIAGNOSIS — R10.84 GENERALIZED ABDOMINAL PAIN: ICD-10-CM

## 2025-01-31 DIAGNOSIS — Z00.00 ANNUAL PHYSICAL EXAM: ICD-10-CM

## 2025-01-31 DIAGNOSIS — Z00.00 ANNUAL PHYSICAL EXAM: Primary | ICD-10-CM

## 2025-01-31 LAB
BASOPHILS # BLD: 0.1 K/MCL (ref 0–0.3)
BASOPHILS NFR BLD: 1 %
DEPRECATED RDW RBC: 44.4 FL (ref 39–50)
EOSINOPHIL # BLD: 0.2 K/MCL (ref 0–0.5)
EOSINOPHIL NFR BLD: 3 %
ERYTHROCYTE [DISTWIDTH] IN BLOOD: 13 % (ref 11–15)
HCT VFR BLD CALC: 36.3 % (ref 36–46.5)
HGB BLD-MCNC: 11.6 G/DL (ref 12–15.5)
IMM GRANULOCYTES # BLD AUTO: 0 K/MCL (ref 0–0.2)
IMM GRANULOCYTES # BLD: 0 %
LYMPHOCYTES # BLD: 2.1 K/MCL (ref 1–4)
LYMPHOCYTES NFR BLD: 42 %
MCH RBC QN AUTO: 30 PG (ref 26–34)
MCHC RBC AUTO-ENTMCNC: 32 G/DL (ref 32–36.5)
MCV RBC AUTO: 93.8 FL (ref 78–100)
MONOCYTES # BLD: 0.4 K/MCL (ref 0.3–0.9)
MONOCYTES NFR BLD: 7 %
NEUTROPHILS # BLD: 2.4 K/MCL (ref 1.8–7.7)
NEUTROPHILS NFR BLD: 47 %
NRBC BLD MANUAL-RTO: 0 /100 WBC
PLATELET # BLD AUTO: 320 K/MCL (ref 140–450)
RBC # BLD: 3.87 MIL/MCL (ref 4–5.2)
TSH SERPL-ACNC: 1.45 MCUNITS/ML (ref 0.35–5)
WBC # BLD: 5.2 K/MCL (ref 4.2–11)

## 2025-01-31 PROCEDURE — 85025 COMPLETE CBC W/AUTO DIFF WBC: CPT | Performed by: INTERNAL MEDICINE

## 2025-01-31 PROCEDURE — 74018 RADEX ABDOMEN 1 VIEW: CPT | Performed by: RADIOLOGY

## 2025-01-31 PROCEDURE — 36415 COLL VENOUS BLD VENIPUNCTURE: CPT | Performed by: INTERNAL MEDICINE

## 2025-01-31 PROCEDURE — 72050 X-RAY EXAM NECK SPINE 4/5VWS: CPT | Performed by: RADIOLOGY

## 2025-01-31 PROCEDURE — 84443 ASSAY THYROID STIM HORMONE: CPT | Performed by: INTERNAL MEDICINE

## 2025-01-31 RX ORDER — GABAPENTIN 100 MG/1
100 CAPSULE ORAL NIGHTLY
Qty: 90 CAPSULE | Refills: 0 | Status: SHIPPED | OUTPATIENT
Start: 2025-01-31

## 2025-02-01 ENCOUNTER — APPOINTMENT (OUTPATIENT)
Dept: LAB | Age: 60
End: 2025-02-01

## 2025-02-03 DIAGNOSIS — D64.9 NORMOCYTIC ANEMIA: Primary | ICD-10-CM

## 2025-02-03 DIAGNOSIS — M50.30 DDD (DEGENERATIVE DISC DISEASE), CERVICAL: Primary | ICD-10-CM

## 2025-02-04 ENCOUNTER — LAB SERVICES (OUTPATIENT)
Dept: LAB | Age: 60
End: 2025-02-04

## 2025-02-04 DIAGNOSIS — R00.1 BRADYCARDIA: ICD-10-CM

## 2025-02-04 DIAGNOSIS — E78.5 HYPERLIPIDEMIA, UNSPECIFIED HYPERLIPIDEMIA TYPE: ICD-10-CM

## 2025-02-04 DIAGNOSIS — R73.03 BORDERLINE DIABETES MELLITUS: ICD-10-CM

## 2025-02-04 DIAGNOSIS — I10 HYPERTENSION, UNSPECIFIED TYPE: ICD-10-CM

## 2025-02-04 DIAGNOSIS — D64.9 NORMOCYTIC ANEMIA: ICD-10-CM

## 2025-02-04 DIAGNOSIS — G47.33 SLEEP APNEA, OBSTRUCTIVE: ICD-10-CM

## 2025-02-04 LAB
ALBUMIN SERPL-MCNC: 3.8 G/DL (ref 3.4–5)
ALBUMIN/GLOB SERPL: 1.4 {RATIO} (ref 1–2.4)
ALP SERPL-CCNC: 49 UNITS/L (ref 45–117)
ALT SERPL-CCNC: 24 UNITS/L
ANION GAP SERPL CALC-SCNC: 10 MMOL/L (ref 7–19)
AST SERPL-CCNC: 16 UNITS/L
BILIRUB SERPL-MCNC: 0.4 MG/DL (ref 0.2–1)
BUN SERPL-MCNC: 14 MG/DL (ref 6–20)
BUN/CREAT SERPL: 15 (ref 7–25)
CALCIUM SERPL-MCNC: 9.4 MG/DL (ref 8.4–10.2)
CHLORIDE SERPL-SCNC: 105 MMOL/L (ref 97–110)
CHOLEST SERPL-MCNC: 164 MG/DL
CHOLEST/HDLC SERPL: 1.9 {RATIO}
CO2 SERPL-SCNC: 32 MMOL/L (ref 21–32)
CREAT SERPL-MCNC: 0.94 MG/DL (ref 0.51–0.95)
EGFRCR SERPLBLD CKD-EPI 2021: 70 ML/MIN/{1.73_M2}
FASTING DURATION TIME PATIENT: 10 HOURS (ref 0–999)
FERRITIN SERPL-MCNC: 393 NG/ML (ref 8–252)
FOLATE SERPL-MCNC: 18.6 NG/ML
GLOBULIN SER-MCNC: 2.8 G/DL (ref 2–4)
GLUCOSE SERPL-MCNC: 73 MG/DL (ref 70–99)
HDLC SERPL-MCNC: 87 MG/DL
IRON SERPL-MCNC: 65 MCG/DL (ref 50–170)
LDLC SERPL CALC-MCNC: 71 MG/DL
NONHDLC SERPL-MCNC: 77 MG/DL
POTASSIUM SERPL-SCNC: 4 MMOL/L (ref 3.4–5.1)
PROT SERPL-MCNC: 6.6 G/DL (ref 6.4–8.2)
SODIUM SERPL-SCNC: 143 MMOL/L (ref 135–145)
TRIGL SERPL-MCNC: 28 MG/DL
VIT B12 SERPL-MCNC: 1114 PG/ML (ref 211–911)

## 2025-02-04 PROCEDURE — 82746 ASSAY OF FOLIC ACID SERUM: CPT | Performed by: CLINICAL MEDICAL LABORATORY

## 2025-02-04 PROCEDURE — 80053 COMPREHEN METABOLIC PANEL: CPT | Performed by: INTERNAL MEDICINE

## 2025-02-04 PROCEDURE — 36415 COLL VENOUS BLD VENIPUNCTURE: CPT | Performed by: INTERNAL MEDICINE

## 2025-02-04 PROCEDURE — 82728 ASSAY OF FERRITIN: CPT | Performed by: INTERNAL MEDICINE

## 2025-02-04 PROCEDURE — 80061 LIPID PANEL: CPT | Performed by: INTERNAL MEDICINE

## 2025-02-04 PROCEDURE — 83540 ASSAY OF IRON: CPT | Performed by: INTERNAL MEDICINE

## 2025-02-04 PROCEDURE — 82607 VITAMIN B-12: CPT | Performed by: CLINICAL MEDICAL LABORATORY

## 2025-02-06 ENCOUNTER — OFFICE VISIT (OUTPATIENT)
Dept: INTERNAL MEDICINE CLINIC | Facility: CLINIC | Age: 60
End: 2025-02-06
Payer: COMMERCIAL

## 2025-02-06 VITALS
SYSTOLIC BLOOD PRESSURE: 110 MMHG | HEART RATE: 58 BPM | DIASTOLIC BLOOD PRESSURE: 68 MMHG | HEIGHT: 65 IN | BODY MASS INDEX: 23.82 KG/M2 | RESPIRATION RATE: 16 BRPM | WEIGHT: 143 LBS

## 2025-02-06 DIAGNOSIS — R20.2 PARESTHESIA OF BOTH FEET: ICD-10-CM

## 2025-02-06 DIAGNOSIS — K21.00 GASTROESOPHAGEAL REFLUX DISEASE WITH ESOPHAGITIS, UNSPECIFIED WHETHER HEMORRHAGE: ICD-10-CM

## 2025-02-06 DIAGNOSIS — Z51.81 ENCOUNTER FOR THERAPEUTIC DRUG MONITORING: Primary | ICD-10-CM

## 2025-02-06 DIAGNOSIS — Z90.3 HISTORY OF SLEEVE GASTRECTOMY: ICD-10-CM

## 2025-02-06 DIAGNOSIS — G43.909 MIGRAINE WITHOUT STATUS MIGRAINOSUS, NOT INTRACTABLE, UNSPECIFIED MIGRAINE TYPE: ICD-10-CM

## 2025-02-06 DIAGNOSIS — R79.89 ELEVATED FERRITIN: Primary | ICD-10-CM

## 2025-02-06 DIAGNOSIS — I10 ESSENTIAL HYPERTENSION: ICD-10-CM

## 2025-02-06 DIAGNOSIS — Z86.39 HISTORY OF MORBID OBESITY: ICD-10-CM

## 2025-02-06 DIAGNOSIS — E51.9 THIAMINE DEFICIENCY: ICD-10-CM

## 2025-02-06 DIAGNOSIS — M51.34 DEGENERATIVE DISC DISEASE, THORACIC: ICD-10-CM

## 2025-02-06 DIAGNOSIS — E66.3 OVERWEIGHT (BMI 25.0-29.9): ICD-10-CM

## 2025-02-06 DIAGNOSIS — E78.00 HYPERCHOLESTEROLEMIA: ICD-10-CM

## 2025-02-06 RX ORDER — GABAPENTIN 100 MG/1
100 CAPSULE ORAL NIGHTLY
COMMUNITY
Start: 2025-01-31

## 2025-02-06 RX ORDER — THIAMINE HYDROCHLORIDE 100 MG/ML
100 INJECTION, SOLUTION INTRAMUSCULAR; INTRAVENOUS ONCE
Status: COMPLETED | OUTPATIENT
Start: 2025-02-06 | End: 2025-02-06

## 2025-02-06 RX ORDER — ONDANSETRON 4 MG/1
1 TABLET, ORALLY DISINTEGRATING ORAL EVERY 8 HOURS PRN
COMMUNITY
Start: 2025-01-21

## 2025-02-06 RX ADMIN — THIAMINE HYDROCHLORIDE 100 MG: 100 INJECTION, SOLUTION INTRAMUSCULAR; INTRAVENOUS at 16:58:00

## 2025-02-06 NOTE — PROGRESS NOTES
Sindy Torres is a 59 year old female presents today for follow-up on medical weight loss program for the treatment of overweight, obesity, or morbid obesity with hx of VSG in 5/2021 and associated HTN, Hyperlipidemia, OA, GERD.    S:  Current weight   Wt Readings from Last 6 Encounters:   02/06/25 143 lb (64.9 kg)   12/18/24 152 lb (68.9 kg)   11/19/24 154 lb (69.9 kg)   09/04/24 167 lb (75.8 kg)   09/03/24 167 lb (75.8 kg)   07/02/24 176 lb (79.8 kg)    AND BMI Body mass index is 23.8 kg/m²..    Patient has lost 24# since LOV 5 months ago. She has been consistent with medication. Has upcoming endoscopy with endoflip in 3/2025 at [facility]. In PT for upper neck and low back pain. Findings of DDD on imaging reviewed. Paresthesia to bilateral feet remain, hands better. Due for last Vitamin B1 injection with repeat level improved via lab. Struggling with nausea and vomiting at times, no know triggers. Hence reason for upcoming testing/procedure.    Testing/consult completed since LOV: Dietician: Estimated caloric needs for weight loss: 1550 cals/d for 2 pounds/week weight loss.    [facility] Weight [facility] Follow Up    Question 2/1/2025  7:11 PM CST - Filed by Patient   Please describe a success moment:    Please describe a challenging moment/needs for improvement:    Please complete this 24 hour food journal, listing everything you had to eat in the past day. Include the average time of day you ate these meals at    List foods, qty and prep for breakfast:    List foods, qty and prep for lunch.    List foods, qty and prep for dinner.    List foods, qty and prep for snacks.    List the types and qty of fluids consumed    On average, how many meals did you eat out per week?    Exercise    How many days per week are you active or exercise    On average, how many days were anaerobic (strength/resistance) exercises performed?    On average, how many days were aerobic (cardio) exercises performed?    Perceived level  of exertion on a scale of 1-5, with 5 being very intense:    Stress    Average stress level on a scale of 1-10, with 10 being extremely stressed: 5   If greater than 5/1O how would you grade your coping mechanisms? moderate   Sleep hours and integrity    How many hours of uninterrupted sleep do you get a night: 6   Do you feel rested in the morning: No   If no, what may have been disrupting your sleep? Pain and soarness in body!   Please list any goal(s) for your next visit None     Social hx and PMH reviewed. Employed from home full time.  with 3 kids, 1 set of twins. Good spousal support.    REVIEW OF SYSTEMS:  GENERAL: feels well otherwise  LUNGS: denies shortness of breath with exertion  CARDIOVASCULAR: denies chest pain on exertion, denies palpitations or pedal edema  GI: denies abdominal pain.  No D/C. +GERD. See above  MUSCULOSKELETAL: see above  NEURO: denies headaches or dizziness. Migraines stable. Paresthesia to bilateral lower legs/feet. See above.  PSYCH: denies change in behavior or mood, denies feeling sad or depressed.      EXAM:  /68   Pulse 58   Resp 16   Ht 5' 5\" (1.651 m)   Wt 143 lb (64.9 kg)   LMP  (LMP Unknown)   BMI 23.80 kg/m²   GENERAL: well developed, well nourished, in no apparent distress  EYES: conjunctiva pink, sclera non icteric, PERRLA  LUNGS: CTA in all fields, breathing non labored  CARDIO: regular rhythm without murmur, normal S1 and S2 without clicks or gallops, no pedal edema.  GI: +BS  NEURO/MS: motor and sensory grossly intact  PSYCH: pleasant, cooperative, normal mood and affect    ASSESSMENT AND PLAN:  Encounter Diagnoses   Name Primary?    Encounter for therapeutic drug monitoring Yes    Overweight (BMI 25.0-29.9)     Hypercholesterolemia     Gastroesophageal reflux disease with esophagitis, unspecified whether hemorrhage     Essential hypertension     Thiamine deficiency     Paresthesia of both feet     Migraine without status migrainosus, not  intractable, unspecified migraine type     Degenerative disc disease, thoracic     History of morbid obesity     History of sleeve gastrectomy            No orders of the defined types were placed in this encounter.      Meds & Refills for this Visit:  Requested Prescriptions     Signed Prescriptions Disp Refills    semaglutide-weight management 1.7 MG/0.75ML Subcutaneous Solution Auto-injector 9 mL 1     Sig: Inject 0.75 mL (1.7 mg total) into the skin once a week.       Imaging & Consults:  ACUPUNCTURE [place] - INTERNAL REFERRAL      Plan:  Patient has lost 24# since LOV 5 months ago on Wegovy 1.7 mg weekly with a total weight loss of 124# since initial consult on 5/21/2019 with initial weight of 267#.  Weight loss goal:  lose 40# in 6 months, 75# in 1 year. BP controlled. CPM. Hx of Qsymia, Naltrexone, Fluoxetine. Vitamin B1 injection given by MA today. Recheck Vitamin B1 lab in 1-2 months. Continue bariatric vitamin daily.   on fitness. Refer to acupuncture. See patient instructions below for additional plans and patient counseling.    Patient Instructions   Continue making lifestyle changes that focus on good nutrition, regular exercise and stress management.    Medication Plan: Continue Wegovy at current dose.     Next steps to work on before next office visit include: Check non fasting lab Vitamin B1 level in about 1-2 months. Continue with planned GI procedure and follow up. Keep me posted with any changes. Recommend a variety of fitness 4 function. Discuss with your physical therapist appropriate options at this time and consider acupuncture with referral placed to [facility].    Developing a balanced fitness routine takes time. Begin to build the mentality of fitness 4 function! Start gradually and safely to continue to lose and maintain weight loss, build strength and prevent injury. Fitness not only supports a healthy body, but also a healthy mind with reducing stress and lifting your  mood. I recommend a routine of 3x/week of cardio with varying intensity, 3x/week of strength training and 1x/week of stretching/flexibility/balance- such as Yoga.  Three ingredients necessary for making a habit of exercise for a lifetime includes: convenience, budget friendly and most importantly FUN! Changing up your exercise routine seasonally can keep you motivated and expose you to new interests and challenges! Step outside your comfort zone and give it a try, you never know where the challenge will lead you and what changes you may see!    Learning to Apply the FITT Principle to Your Exercise Plan  One of the biggest challenges with exercise is knowing where to start and how to get better. To improve your fitness, you must self-monitor your workouts and make changes when necessary. One of the best tools you can use to help you is the FITT Principle.  FITT is an acronym that outlines the basic components of a successful exercise plan.  Frequency - How often you exercise  Intensity - How hard you exercise  Time - How long you exercise  Type - What kind of exercise you do  How Often Should You Exercise?  It is a myth that you must work out for extended periods every day to lose weight and keep it off. What is considered an “effective” exercise varies between people. Factors that affect this include age, fitness level, mobility, health conditions, etc.  Before you begin an exercise plan and decide how often to exercise, consider these key factors.  What is your current fitness level? What are you able to do?  What is your schedule? How much time do you have to exercise?  What health and fitness goals do you want to achieve?  Build your plan off of the answers to these questions. If you are a busy mom and you want to lose weight to gain more energy, you might not have a lot of time. Maybe your only form of exercise is keeping up with your kids. In this case, you may want to start small. For example, you could plan  workouts for weekend afternoons when your spouse can watch the kids.  How Hard Should You Exercise?  The best way to see how hard you are working is to monitor your heart rate. You can do this by wearing a fitness tracker, heart rate monitor or smart watch. You can also feel for your heartbeat and count it over a 15-second period.  Low-intensity - An activity level you can continue for a long time (walking)  Moderate-intensity - An activity level that will boost your heart rate and require effort to maintain (biking)  High-intensity - An activity level that feels like an all-out effort. Your heart rate is high and you can't speak complete sentences between breaths  How Long Should You Exercise?  The time you spend exercising will usually depend on what you are doing. Health experts recommend at least 30 minutes of cardio exercise each workout. However, if you are doing a strength-based exercise, you will likely pay more attention to your number of “sets” and “reps.” Regardless, many other factors are involved.  The amount of time you have  How long it takes you to feel fatigued  Weather, time of day  Health conditions  What Should You Do for Exercise?  Should you hit up the elliptical at the gym? Should you go for a hike? The type of exercise you do depends on what you like and what results you want.  For example, if you want to improve your cardio-vascular fitness and you love the outdoors, try exercises like hiking, swimming and biking. If you want to improve your muscle strength and you enjoy the convenience of the gym, try using free weights or machine weights. You can also use your body weight for exercises like push-ups, chin-ups, planks, etc.  The FITT Principle: Final Considerations  You can use the FITT Principle for cardio exercise, strength-based exercise, stretching and more. However, before you start any exercise plan, first consult with your healthcare provider. They will help you develop a plan that  is safe and effective. By using the FITT Principle, you can not only improve your fitness level with time, but you can also prevent serious injury.      Medication use and SEs reviewed with patient.    Return in about 3 months (around 5/6/2025) for weight management via clinic as scheduled.    Patient verbalizes understanding.    DOCUMENTATION OF TIME SPENT: Code selection for this visit was based on time spent : 30 minutes on date of service in preparing to see the patient, obtaining and/or reviewing separately obtained history, performing a medically appropriate examination, counseling and educating the patient/family/caregiver, ordering medications or testing, referring and communicating with other healthcare providers, documenting clinical information in the electronic medical record, independently interpreting results and communicating results to the patient/family/caregiver and care coordination with the patient's other providers.      Answers submitted by the patient for this visit:  Medical Weight Loss Follow Up (Submitted on 2/1/2025)  If greater than 5/1O how would you grade your coping mechanisms?: moderate

## 2025-02-07 ENCOUNTER — APPOINTMENT (OUTPATIENT)
Dept: PHYSICAL THERAPY | Age: 60
End: 2025-02-07

## 2025-02-07 DIAGNOSIS — Z51.81 ENCOUNTER FOR THERAPEUTIC DRUG MONITORING: ICD-10-CM

## 2025-02-07 DIAGNOSIS — E66.3 OVERWEIGHT (BMI 25.0-29.9): ICD-10-CM

## 2025-02-07 DIAGNOSIS — Z86.39 HISTORY OF MORBID OBESITY: ICD-10-CM

## 2025-02-07 DIAGNOSIS — E78.00 HYPERCHOLESTEROLEMIA: ICD-10-CM

## 2025-02-07 DIAGNOSIS — I10 ESSENTIAL HYPERTENSION: ICD-10-CM

## 2025-02-10 ENCOUNTER — HOSPITAL ENCOUNTER (OUTPATIENT)
Age: 60
Discharge: HOME | End: 2025-02-10
Attending: INTERNAL MEDICINE

## 2025-02-10 ENCOUNTER — APPOINTMENT (OUTPATIENT)
Dept: PHYSICAL THERAPY | Age: 60
End: 2025-02-10

## 2025-02-10 ENCOUNTER — LAB SERVICES (OUTPATIENT)
Dept: LAB | Age: 60
End: 2025-02-10

## 2025-02-10 DIAGNOSIS — R63.4 WEIGHT LOSS: ICD-10-CM

## 2025-02-10 DIAGNOSIS — R10.84 GENERALIZED ABDOMINAL PAIN: ICD-10-CM

## 2025-02-10 DIAGNOSIS — M54.2 NECK PAIN ON LEFT SIDE: ICD-10-CM

## 2025-02-10 DIAGNOSIS — M54.6 ACUTE MIDLINE THORACIC BACK PAIN: ICD-10-CM

## 2025-02-10 DIAGNOSIS — R19.5 LOOSE STOOLS: ICD-10-CM

## 2025-02-10 DIAGNOSIS — R19.7 NAUSEA VOMITING AND DIARRHEA: ICD-10-CM

## 2025-02-10 DIAGNOSIS — R11.2 NAUSEA VOMITING AND DIARRHEA: ICD-10-CM

## 2025-02-10 DIAGNOSIS — M62.81 MUSCLE WEAKNESS: Primary | ICD-10-CM

## 2025-02-10 DIAGNOSIS — M54.50 ACUTE BILATERAL LOW BACK PAIN WITHOUT SCIATICA: ICD-10-CM

## 2025-02-10 PROCEDURE — 97161 PT EVAL LOW COMPLEX 20 MIN: CPT

## 2025-02-10 PROCEDURE — 10002805 HB CONTRAST AGENT: Performed by: INTERNAL MEDICINE

## 2025-02-10 PROCEDURE — 74177 CT ABD & PELVIS W/CONTRAST: CPT

## 2025-02-10 PROCEDURE — 97140 MANUAL THERAPY 1/> REGIONS: CPT

## 2025-02-10 PROCEDURE — 97112 NEUROMUSCULAR REEDUCATION: CPT

## 2025-02-10 PROCEDURE — 87493 C DIFF AMPLIFIED PROBE: CPT | Performed by: INTERNAL MEDICINE

## 2025-02-10 RX ADMIN — IOHEXOL 25 ML: 350 INJECTION, SOLUTION INTRAVENOUS at 11:05

## 2025-02-10 RX ADMIN — IOHEXOL 73 ML: 350 INJECTION, SOLUTION INTRAVENOUS at 12:00

## 2025-02-10 ASSESSMENT — ENCOUNTER SYMPTOMS
ALLEVIATING FACTORS: REST
ALLEVIATING FACTORS: CHANGE IN POSITION
QUALITY: STIFF
PAIN FREQUENCY: INTERMITTENT
QUALITY: ACHE
ALLEVIATING FACTORS: AVOIDING MOVEMENT IN INVOLVED AREA
ALLEVIATING FACTOR: SEE NARRATIVE ABOVE FOR FURTHER INFORMATION
PAIN DESCRIPTION: SEE NARRATIVE ABOVE FOR FURTHER INFORMATION
QUALITY: SORE

## 2025-02-10 NOTE — PATIENT INSTRUCTIONS
Continue making lifestyle changes that focus on good nutrition, regular exercise and stress management.    Medication Plan: Continue Wegovy at current dose.     Next steps to work on before next office visit include: Check non fasting lab Vitamin B1 level in about 1-2 months. Continue with planned GI procedure and follow up. Keep me posted with any changes. Recommend a variety of fitness 4 function. Discuss with your physical therapist appropriate options at this time and consider acupuncture with referral placed to [facility].    Developing a balanced fitness routine takes time. Begin to build the mentality of fitness 4 function! Start gradually and safely to continue to lose and maintain weight loss, build strength and prevent injury. Fitness not only supports a healthy body, but also a healthy mind with reducing stress and lifting your mood. I recommend a routine of 3x/week of cardio with varying intensity, 3x/week of strength training and 1x/week of stretching/flexibility/balance- such as Yoga.  Three ingredients necessary for making a habit of exercise for a lifetime includes: convenience, budget friendly and most importantly FUN! Changing up your exercise routine seasonally can keep you motivated and expose you to new interests and challenges! Step outside your comfort zone and give it a try, you never know where the challenge will lead you and what changes you may see!    Learning to Apply the FITT Principle to Your Exercise Plan  One of the biggest challenges with exercise is knowing where to start and how to get better. To improve your fitness, you must self-monitor your workouts and make changes when necessary. One of the best tools you can use to help you is the FITT Principle.  FITT is an acronym that outlines the basic components of a successful exercise plan.  Frequency - How often you exercise  Intensity - How hard you exercise  Time - How long you exercise  Type - What kind of exercise you  do  How Often Should You Exercise?  It is a myth that you must work out for extended periods every day to lose weight and keep it off. What is considered an “effective” exercise varies between people. Factors that affect this include age, fitness level, mobility, health conditions, etc.  Before you begin an exercise plan and decide how often to exercise, consider these key factors.  What is your current fitness level? What are you able to do?  What is your schedule? How much time do you have to exercise?  What health and fitness goals do you want to achieve?  Build your plan off of the answers to these questions. If you are a busy mom and you want to lose weight to gain more energy, you might not have a lot of time. Maybe your only form of exercise is keeping up with your kids. In this case, you may want to start small. For example, you could plan workouts for weekend afternoons when your spouse can watch the kids.  How Hard Should You Exercise?  The best way to see how hard you are working is to monitor your heart rate. You can do this by wearing a fitness tracker, heart rate monitor or smart watch. You can also feel for your heartbeat and count it over a 15-second period.  Low-intensity - An activity level you can continue for a long time (walking)  Moderate-intensity - An activity level that will boost your heart rate and require effort to maintain (biking)  High-intensity - An activity level that feels like an all-out effort. Your heart rate is high and you can't speak complete sentences between breaths  How Long Should You Exercise?  The time you spend exercising will usually depend on what you are doing. Health experts recommend at least 30 minutes of cardio exercise each workout. However, if you are doing a strength-based exercise, you will likely pay more attention to your number of “sets” and “reps.” Regardless, many other factors are involved.  The amount of time you have  How long it takes you to feel  fatigued  Weather, time of day  Health conditions  What Should You Do for Exercise?  Should you hit up the elliptical at the gym? Should you go for a hike? The type of exercise you do depends on what you like and what results you want.  For example, if you want to improve your cardio-vascular fitness and you love the outdoors, try exercises like hiking, swimming and biking. If you want to improve your muscle strength and you enjoy the convenience of the gym, try using free weights or machine weights. You can also use your body weight for exercises like push-ups, chin-ups, planks, etc.  The FITT Principle: Final Considerations  You can use the FITT Principle for cardio exercise, strength-based exercise, stretching and more. However, before you start any exercise plan, first consult with your healthcare provider. They will help you develop a plan that is safe and effective. By using the FITT Principle, you can not only improve your fitness level with time, but you can also prevent serious injury.

## 2025-02-11 DIAGNOSIS — K42.9 UMBILICAL HERNIA WITHOUT OBSTRUCTION AND WITHOUT GANGRENE: Primary | ICD-10-CM

## 2025-02-11 LAB
C DIFF TOX B TCDB STL QL NAA+PROBE: NOT DETECTED
C JEJUNI+C COLI AG STL QL: NORMAL
FAT STL SUDAN IV STN: ABNORMAL
O+P STL MICRO: NEGATIVE

## 2025-02-12 ENCOUNTER — E-ADVICE (OUTPATIENT)
Dept: SURGERY | Age: 60
End: 2025-02-12

## 2025-02-12 LAB
BACTERIA STL CULT: NORMAL
E COLI SHIGA-LIKE TOXIN 1+2 STL QL IA: NORMAL
H PYLORI AG STL QL IA: NEGATIVE

## 2025-02-17 ENCOUNTER — APPOINTMENT (OUTPATIENT)
Dept: PHYSICAL THERAPY | Age: 60
End: 2025-02-17
Attending: INTERNAL MEDICINE

## 2025-02-18 ENCOUNTER — APPOINTMENT (OUTPATIENT)
Dept: PHYSICAL THERAPY | Age: 60
End: 2025-02-18

## 2025-02-18 ENCOUNTER — LAB SERVICES (OUTPATIENT)
Dept: LAB | Age: 60
End: 2025-02-18

## 2025-02-18 DIAGNOSIS — R79.89 ELEVATED FERRITIN: ICD-10-CM

## 2025-02-18 DIAGNOSIS — R19.5 LOOSE STOOLS: ICD-10-CM

## 2025-02-18 LAB — FERRITIN SERPL-MCNC: 376 NG/ML (ref 8–252)

## 2025-02-18 PROCEDURE — 82653 EL-1 FECAL QUANTITATIVE: CPT | Performed by: CLINICAL MEDICAL LABORATORY

## 2025-02-18 PROCEDURE — 86140 C-REACTIVE PROTEIN: CPT | Performed by: CLINICAL MEDICAL LABORATORY

## 2025-02-18 PROCEDURE — 36415 COLL VENOUS BLD VENIPUNCTURE: CPT | Performed by: INTERNAL MEDICINE

## 2025-02-18 PROCEDURE — 84466 ASSAY OF TRANSFERRIN: CPT | Performed by: CLINICAL MEDICAL LABORATORY

## 2025-02-18 PROCEDURE — 82728 ASSAY OF FERRITIN: CPT | Performed by: INTERNAL MEDICINE

## 2025-02-18 PROCEDURE — 83993 ASSAY FOR CALPROTECTIN FECAL: CPT | Performed by: CLINICAL MEDICAL LABORATORY

## 2025-02-19 ENCOUNTER — E-ADVICE (OUTPATIENT)
Dept: GASTROENTEROLOGY | Age: 60
End: 2025-02-19

## 2025-02-19 ENCOUNTER — TELEPHONE (OUTPATIENT)
Dept: INTERNAL MEDICINE CLINIC | Facility: CLINIC | Age: 60
End: 2025-02-19

## 2025-02-19 LAB
CALPROTECTIN STL-MCNT: 146 UG/G
ELASTASE PANC STL-MCNT: >800 UG/G

## 2025-02-19 NOTE — TELEPHONE ENCOUNTER
Brent with Dr. Galdamez's office LVM to call about mutual patient  They are [facility]  I called the office back and transferred twice  Last transfer to nurse line - no answer - rings without VM, back up to front and got Maylin.   They need a faxed form sent back that we are okay with her holding wegovy for her GI procedure.  No fax received.  They are refaxing now

## 2025-02-20 DIAGNOSIS — R19.5 LOOSE STOOLS: Primary | ICD-10-CM

## 2025-02-21 ENCOUNTER — TELEPHONE (OUTPATIENT)
Dept: INTERNAL MEDICINE | Age: 60
End: 2025-02-21

## 2025-02-21 ENCOUNTER — TELEPHONE (OUTPATIENT)
Dept: GASTROENTEROLOGY | Age: 60
End: 2025-02-21

## 2025-02-21 DIAGNOSIS — R79.89 ELEVATED FERRITIN: Primary | ICD-10-CM

## 2025-02-21 LAB
CRP SERPL-MCNC: <5 MG/L
TRANSFERRIN SERPL-MCNC: 215 MG/DL (ref 200–360)

## 2025-02-25 ENCOUNTER — APPOINTMENT (OUTPATIENT)
Dept: PHYSICAL THERAPY | Age: 60
End: 2025-02-25
Attending: INTERNAL MEDICINE

## 2025-02-25 ENCOUNTER — IMAGING SERVICES (OUTPATIENT)
Dept: GENERAL RADIOLOGY | Age: 60
End: 2025-02-25
Attending: SURGERY

## 2025-02-25 DIAGNOSIS — M50.30 DDD (DEGENERATIVE DISC DISEASE), CERVICAL: Primary | ICD-10-CM

## 2025-02-25 DIAGNOSIS — R14.0 BLOATING: ICD-10-CM

## 2025-02-25 PROCEDURE — 97112 NEUROMUSCULAR REEDUCATION: CPT

## 2025-02-25 PROCEDURE — 97530 THERAPEUTIC ACTIVITIES: CPT

## 2025-02-25 PROCEDURE — 74018 RADEX ABDOMEN 1 VIEW: CPT | Performed by: RADIOLOGY

## 2025-02-25 PROCEDURE — 97140 MANUAL THERAPY 1/> REGIONS: CPT

## 2025-02-26 ENCOUNTER — E-ADVICE (OUTPATIENT)
Dept: GASTROENTEROLOGY | Age: 60
End: 2025-02-26

## 2025-02-26 DIAGNOSIS — R19.5 ELEVATED FECAL CALPROTECTIN: ICD-10-CM

## 2025-02-26 DIAGNOSIS — Z86.0100 HISTORY OF COLONIC POLYPS: Primary | ICD-10-CM

## 2025-02-26 DIAGNOSIS — R79.89 ELEVATED FERRITIN: Primary | ICD-10-CM

## 2025-02-26 DIAGNOSIS — K52.9 CHRONIC DIARRHEA: ICD-10-CM

## 2025-02-26 RX ORDER — POLYETHYLENE GLYCOL 3350, SODIUM SULFATE, POTASSIUM CHLORIDE, MAGNESIUM SULFATE, AND SODIUM CHLORIDE FOR ORAL SOLUTION 178.7-7.3G
1 KIT ORAL SEE ADMIN INSTRUCTIONS
Qty: 1 EACH | Refills: 0 | Status: SHIPPED | OUTPATIENT
Start: 2025-02-26 | End: 2025-03-26 | Stop reason: ALTCHOICE

## 2025-02-26 RX ORDER — SIMETHICONE 125 MG
TABLET,CHEWABLE ORAL
Qty: 2 TABLET | Refills: 0 | Status: SHIPPED | OUTPATIENT
Start: 2025-02-26 | End: 2025-03-26 | Stop reason: ALTCHOICE

## 2025-02-26 RX ORDER — BISACODYL 5 MG/1
TABLET, DELAYED RELEASE ORAL
Qty: 2 TABLET | Refills: 0 | Status: SHIPPED | OUTPATIENT
Start: 2025-02-26 | End: 2025-03-26 | Stop reason: ALTCHOICE

## 2025-02-27 ENCOUNTER — LAB SERVICES (OUTPATIENT)
Dept: LAB | Age: 60
End: 2025-02-27

## 2025-02-27 ENCOUNTER — APPOINTMENT (OUTPATIENT)
Dept: SURGERY | Age: 60
End: 2025-02-27
Attending: INTERNAL MEDICINE

## 2025-02-27 VITALS — WEIGHT: 143 LBS | BODY MASS INDEX: 23.82 KG/M2 | HEIGHT: 65 IN | TEMPERATURE: 97.9 F

## 2025-02-27 DIAGNOSIS — R14.0 BLOATING: Primary | ICD-10-CM

## 2025-02-27 DIAGNOSIS — K42.9 UMBILICAL HERNIA WITHOUT OBSTRUCTION AND WITHOUT GANGRENE: ICD-10-CM

## 2025-02-27 DIAGNOSIS — R79.89 ELEVATED FERRITIN: ICD-10-CM

## 2025-02-27 DIAGNOSIS — K42.0 UMBILICAL HERNIA WITH OBSTRUCTION, WITHOUT GANGRENE: ICD-10-CM

## 2025-02-27 LAB
ERYTHROCYTE [SEDIMENTATION RATE] IN BLOOD BY WESTERGREN METHOD: <1 MM/HR (ref 0–20)
FERRITIN SERPL-MCNC: 358 NG/ML (ref 8–252)

## 2025-02-27 PROCEDURE — 99213 OFFICE O/P EST LOW 20 MIN: CPT | Performed by: SURGERY

## 2025-02-27 PROCEDURE — 36415 COLL VENOUS BLD VENIPUNCTURE: CPT | Performed by: INTERNAL MEDICINE

## 2025-02-27 PROCEDURE — 46600 DIAGNOSTIC ANOSCOPY SPX: CPT | Performed by: SURGERY

## 2025-02-27 PROCEDURE — 82728 ASSAY OF FERRITIN: CPT | Performed by: INTERNAL MEDICINE

## 2025-02-27 PROCEDURE — 85652 RBC SED RATE AUTOMATED: CPT | Performed by: INTERNAL MEDICINE

## 2025-03-03 ENCOUNTER — APPOINTMENT (OUTPATIENT)
Dept: CARDIOLOGY | Age: 60
End: 2025-03-03

## 2025-03-03 ENCOUNTER — ANCILLARY PROCEDURE (OUTPATIENT)
Dept: CARDIOLOGY | Age: 60
End: 2025-03-03
Attending: INTERNAL MEDICINE

## 2025-03-03 VITALS
HEART RATE: 60 BPM | HEIGHT: 65 IN | WEIGHT: 143 LBS | DIASTOLIC BLOOD PRESSURE: 78 MMHG | SYSTOLIC BLOOD PRESSURE: 108 MMHG | OXYGEN SATURATION: 100 % | BODY MASS INDEX: 23.82 KG/M2

## 2025-03-03 DIAGNOSIS — R00.1 BRADYCARDIA: ICD-10-CM

## 2025-03-03 DIAGNOSIS — R73.03 BORDERLINE DIABETES MELLITUS: ICD-10-CM

## 2025-03-03 DIAGNOSIS — I10 HYPERTENSION, UNSPECIFIED TYPE: ICD-10-CM

## 2025-03-03 DIAGNOSIS — E78.5 HYPERLIPIDEMIA, UNSPECIFIED HYPERLIPIDEMIA TYPE: ICD-10-CM

## 2025-03-03 DIAGNOSIS — R42 LIGHTHEADEDNESS: ICD-10-CM

## 2025-03-03 DIAGNOSIS — G47.33 SLEEP APNEA, OBSTRUCTIVE: ICD-10-CM

## 2025-03-03 DIAGNOSIS — R42 LIGHTHEADEDNESS: Primary | ICD-10-CM

## 2025-03-03 PROCEDURE — 3078F DIAST BP <80 MM HG: CPT | Performed by: INTERNAL MEDICINE

## 2025-03-03 PROCEDURE — 93000 ELECTROCARDIOGRAM COMPLETE: CPT | Performed by: INTERNAL MEDICINE

## 2025-03-03 PROCEDURE — 99214 OFFICE O/P EST MOD 30 MIN: CPT | Performed by: INTERNAL MEDICINE

## 2025-03-03 PROCEDURE — 3074F SYST BP LT 130 MM HG: CPT | Performed by: INTERNAL MEDICINE

## 2025-03-03 RX ORDER — LISINOPRIL 10 MG/1
5 TABLET ORAL DAILY
Status: SHIPPED | COMMUNITY
Start: 2025-03-03

## 2025-03-03 ASSESSMENT — PATIENT HEALTH QUESTIONNAIRE - PHQ9
SUM OF ALL RESPONSES TO PHQ9 QUESTIONS 1 AND 2: 0
CLINICAL INTERPRETATION OF PHQ2 SCORE: NO FURTHER SCREENING NEEDED
2. FEELING DOWN, DEPRESSED OR HOPELESS: NOT AT ALL
SUM OF ALL RESPONSES TO PHQ9 QUESTIONS 1 AND 2: 0
1. LITTLE INTEREST OR PLEASURE IN DOING THINGS: NOT AT ALL

## 2025-03-04 ENCOUNTER — APPOINTMENT (OUTPATIENT)
Dept: PHYSICAL THERAPY | Age: 60
End: 2025-03-04
Attending: INTERNAL MEDICINE

## 2025-03-04 DIAGNOSIS — M54.2 NECK PAIN ON LEFT SIDE: ICD-10-CM

## 2025-03-04 DIAGNOSIS — M54.6 ACUTE MIDLINE THORACIC BACK PAIN: ICD-10-CM

## 2025-03-04 DIAGNOSIS — M54.50 ACUTE BILATERAL LOW BACK PAIN WITHOUT SCIATICA: ICD-10-CM

## 2025-03-04 DIAGNOSIS — M62.81 MUSCLE WEAKNESS: Primary | ICD-10-CM

## 2025-03-11 ENCOUNTER — TELEPHONE (OUTPATIENT)
Dept: GASTROENTEROLOGY | Age: 60
End: 2025-03-11

## 2025-03-11 ENCOUNTER — APPOINTMENT (OUTPATIENT)
Dept: PHYSICAL THERAPY | Age: 60
End: 2025-03-11
Attending: INTERNAL MEDICINE

## 2025-03-11 ENCOUNTER — IMAGING SERVICES (OUTPATIENT)
Dept: MAMMOGRAPHY | Age: 60
End: 2025-03-11
Attending: INTERNAL MEDICINE

## 2025-03-11 DIAGNOSIS — M54.6 ACUTE MIDLINE THORACIC BACK PAIN: Primary | ICD-10-CM

## 2025-03-11 DIAGNOSIS — Z12.31 ENCOUNTER FOR SCREENING MAMMOGRAM FOR MALIGNANT NEOPLASM OF BREAST: ICD-10-CM

## 2025-03-11 DIAGNOSIS — M54.50 ACUTE BILATERAL LOW BACK PAIN WITHOUT SCIATICA: ICD-10-CM

## 2025-03-11 PROCEDURE — 97140 MANUAL THERAPY 1/> REGIONS: CPT

## 2025-03-11 PROCEDURE — 97112 NEUROMUSCULAR REEDUCATION: CPT

## 2025-03-11 PROCEDURE — 77063 BREAST TOMOSYNTHESIS BI: CPT | Performed by: RADIOLOGY

## 2025-03-11 PROCEDURE — 77067 SCR MAMMO BI INCL CAD: CPT | Performed by: RADIOLOGY

## 2025-03-12 DIAGNOSIS — R92.30 DENSE BREASTS: Primary | ICD-10-CM

## 2025-03-12 DIAGNOSIS — K22.719 BARRETT'S ESOPHAGUS WITH DYSPLASIA: ICD-10-CM

## 2025-03-12 RX ORDER — PANTOPRAZOLE SODIUM 40 MG/1
40 TABLET, DELAYED RELEASE ORAL 2 TIMES DAILY
Qty: 180 TABLET | Refills: 1 | Status: SHIPPED | OUTPATIENT
Start: 2025-03-12

## 2025-03-13 ENCOUNTER — ANESTHESIA (OUTPATIENT)
Dept: GASTROENTEROLOGY | Age: 60
End: 2025-03-13

## 2025-03-13 ENCOUNTER — ANESTHESIA EVENT (OUTPATIENT)
Dept: GASTROENTEROLOGY | Age: 60
End: 2025-03-13

## 2025-03-13 ENCOUNTER — HOSPITAL ENCOUNTER (OUTPATIENT)
Dept: GASTROENTEROLOGY | Age: 60
Discharge: HOME | End: 2025-03-13
Attending: INTERNAL MEDICINE

## 2025-03-13 VITALS
OXYGEN SATURATION: 98 % | TEMPERATURE: 97 F | DIASTOLIC BLOOD PRESSURE: 72 MMHG | SYSTOLIC BLOOD PRESSURE: 100 MMHG | BODY MASS INDEX: 22.6 KG/M2 | HEIGHT: 66 IN | WEIGHT: 140.65 LBS | RESPIRATION RATE: 12 BRPM | HEART RATE: 69 BPM

## 2025-03-13 DIAGNOSIS — Z86.0100 HISTORY OF COLONIC POLYPS: ICD-10-CM

## 2025-03-13 DIAGNOSIS — R19.5 ELEVATED FECAL CALPROTECTIN: ICD-10-CM

## 2025-03-13 DIAGNOSIS — K52.9 CHRONIC DIARRHEA: ICD-10-CM

## 2025-03-13 DIAGNOSIS — K22.70 BARRETT'S ESOPHAGUS WITHOUT DYSPLASIA: ICD-10-CM

## 2025-03-13 PROCEDURE — 13000029 HB GI MAJOR COMPLEX CASE EA ADD MINUTE

## 2025-03-13 PROCEDURE — 91040 ESOPH BALLOON DISTENSION TST: CPT | Performed by: INTERNAL MEDICINE

## 2025-03-13 PROCEDURE — G0105 COLORECTAL SCRN; HI RISK IND: HCPCS | Performed by: INTERNAL MEDICINE

## 2025-03-13 PROCEDURE — 10002800 HB RX 250 W HCPCS: Performed by: ANESTHESIOLOGY

## 2025-03-13 PROCEDURE — 10002807 HB RX 258: Performed by: INTERNAL MEDICINE

## 2025-03-13 PROCEDURE — 13000001 HB PHASE II RECOVERY EA 30 MINUTES

## 2025-03-13 PROCEDURE — 13000008 HB ANESTHESIA MAC OUTSIDE OR

## 2025-03-13 PROCEDURE — 10006023 HB SUPPLY 272

## 2025-03-13 PROCEDURE — 88305 TISSUE EXAM BY PATHOLOGIST: CPT | Performed by: INTERNAL MEDICINE

## 2025-03-13 PROCEDURE — 13000028 HB GI MAJOR COMPLEX CASE S/U + 1ST 15 MIN

## 2025-03-13 PROCEDURE — 10004451 HB PACU RECOVERY 1ST 30 MINUTES

## 2025-03-13 PROCEDURE — C1889 IMPLANT/INSERT DEVICE, NOC: HCPCS

## 2025-03-13 PROCEDURE — 91040 ESOPH BALLOON DISTENSION TST: CPT

## 2025-03-13 PROCEDURE — 43239 EGD BIOPSY SINGLE/MULTIPLE: CPT | Performed by: INTERNAL MEDICINE

## 2025-03-13 PROCEDURE — C1726 CATH, BAL DIL, NON-VASCULAR: HCPCS

## 2025-03-13 RX ORDER — SODIUM CHLORIDE 9 MG/ML
INJECTION, SOLUTION INTRAVENOUS CONTINUOUS
Status: DISCONTINUED | OUTPATIENT
Start: 2025-03-13 | End: 2025-03-15 | Stop reason: HOSPADM

## 2025-03-13 RX ADMIN — SODIUM CHLORIDE: 9 INJECTION, SOLUTION INTRAVENOUS at 08:45

## 2025-03-13 RX ADMIN — PROPOFOL INJECTABLE EMULSION 100 MCG/KG/MIN: 10 INJECTION, EMULSION INTRAVENOUS at 08:49

## 2025-03-13 SDOH — SOCIAL STABILITY: SOCIAL NETWORK
HOW OFTEN DO YOU SEE OR TALK TO PEOPLE THAT YOU CARE ABOUT AND FEEL CLOSE TO? (FOR EXAMPLE: TALKING TO FRIENDS ON THE PHONE, VISITING FRIENDS OR FAMILY, GOING TO CHURCH OR CLUB MEETINGS): 3 TO 5 TIMES A WEEK

## 2025-03-13 SDOH — SOCIAL STABILITY: SOCIAL INSECURITY: HOW OFTEN DOES ANYONE, INCLUDING FAMILY AND FRIENDS, PHYSICALLY HURT YOU?: NEVER

## 2025-03-13 SDOH — SOCIAL STABILITY: SOCIAL INSECURITY: HOW OFTEN DOES ANYONE, INCLUDING FAMILY AND FRIENDS, INSULT OR TALK DOWN TO YOU?: NEVER

## 2025-03-13 SDOH — SOCIAL STABILITY: SOCIAL INSECURITY: HOW OFTEN DOES ANYONE, INCLUDING FAMILY AND FRIENDS, THREATEN YOU WITH HARM?: NEVER

## 2025-03-13 SDOH — SOCIAL STABILITY: SOCIAL INSECURITY: HOW OFTEN DOES ANYONE, INCLUDING FAMILY AND FRIENDS, SCREAM OR CURSE AT YOU?: NEVER

## 2025-03-13 ASSESSMENT — PAIN SCALES - GENERAL
PAINLEVEL_OUTOF10: 0

## 2025-03-13 ASSESSMENT — ACTIVITIES OF DAILY LIVING (ADL)
RECENT_DECLINE_ADL: NO
ADL_SHORT_OF_BREATH: NO
ADL_SCORE: 12
ADL_BEFORE_ADMISSION: INDEPENDENT

## 2025-03-13 ASSESSMENT — LIFESTYLE VARIABLES
ALCOHOL_USE_STATUS: NO OR LOW RISK WITH VALIDATED TOOL
HOW OFTEN DO YOU HAVE A DRINK CONTAINING ALCOHOL: NEVER
HOW OFTEN DO YOU HAVE 6 OR MORE DRINKS ON ONE OCCASION: NEVER
HOW MANY STANDARD DRINKS CONTAINING ALCOHOL DO YOU HAVE ON A TYPICAL DAY: 0,1 OR 2
AUDIT-C TOTAL SCORE: 0

## 2025-03-13 ASSESSMENT — PAIN SCALES - WONG BAKER: WONGBAKER_NUMERICALRESPONSE: 0

## 2025-03-13 ASSESSMENT — ENCOUNTER SYMPTOMS: HEADACHES: 1

## 2025-03-15 ENCOUNTER — APPOINTMENT (OUTPATIENT)
Dept: MAMMOGRAPHY | Age: 60
End: 2025-03-15
Attending: INTERNAL MEDICINE

## 2025-03-17 LAB
ASR DISCLAIMER: NORMAL
CASE RPRT: NORMAL
CLINICAL INFO: NORMAL
PATH REPORT.FINAL DX SPEC: NORMAL
PATH REPORT.GROSS SPEC: NORMAL

## 2025-03-18 ENCOUNTER — APPOINTMENT (OUTPATIENT)
Dept: PHYSICAL THERAPY | Age: 60
End: 2025-03-18
Attending: INTERNAL MEDICINE

## 2025-03-18 DIAGNOSIS — M54.2 NECK PAIN ON LEFT SIDE: ICD-10-CM

## 2025-03-18 DIAGNOSIS — M54.6 ACUTE MIDLINE THORACIC BACK PAIN: ICD-10-CM

## 2025-03-18 DIAGNOSIS — M62.81 MUSCLE WEAKNESS: Primary | ICD-10-CM

## 2025-03-18 DIAGNOSIS — M54.50 ACUTE BILATERAL LOW BACK PAIN WITHOUT SCIATICA: ICD-10-CM

## 2025-03-18 PROCEDURE — 97530 THERAPEUTIC ACTIVITIES: CPT

## 2025-03-18 PROCEDURE — 97140 MANUAL THERAPY 1/> REGIONS: CPT

## 2025-03-20 DIAGNOSIS — G43.009 MIGRAINE WITHOUT AURA AND WITHOUT STATUS MIGRAINOSUS, NOT INTRACTABLE: ICD-10-CM

## 2025-03-21 ENCOUNTER — IMAGING SERVICES (OUTPATIENT)
Dept: ULTRASOUND IMAGING | Age: 60
End: 2025-03-21
Attending: INTERNAL MEDICINE

## 2025-03-21 DIAGNOSIS — R92.8 ABNORMAL ULTRASOUND OF BREAST: Primary | ICD-10-CM

## 2025-03-21 DIAGNOSIS — R92.30 DENSE BREASTS: ICD-10-CM

## 2025-03-21 PROCEDURE — 76641 ULTRASOUND BREAST COMPLETE: CPT | Performed by: RADIOLOGY

## 2025-03-21 RX ORDER — BUTALBITAL, ACETAMINOPHEN AND CAFFEINE 50; 325; 40 MG/1; MG/1; MG/1
1 TABLET ORAL EVERY 6 HOURS PRN
Qty: 30 TABLET | Refills: 0 | Status: SHIPPED | OUTPATIENT
Start: 2025-03-21

## 2025-03-22 ENCOUNTER — TELEPHONE (OUTPATIENT)
Dept: INTERNAL MEDICINE | Age: 60
End: 2025-03-22

## 2025-03-22 DIAGNOSIS — R92.8 ABNORMAL ULTRASOUND OF BREAST: Primary | ICD-10-CM

## 2025-03-24 ENCOUNTER — E-ADVICE (OUTPATIENT)
Dept: CARDIOLOGY | Age: 60
End: 2025-03-24

## 2025-03-25 ENCOUNTER — APPOINTMENT (OUTPATIENT)
Dept: PHYSICAL THERAPY | Age: 60
End: 2025-03-25
Attending: INTERNAL MEDICINE

## 2025-03-25 DIAGNOSIS — M54.2 NECK PAIN ON LEFT SIDE: ICD-10-CM

## 2025-03-25 DIAGNOSIS — M54.6 ACUTE MIDLINE THORACIC BACK PAIN: ICD-10-CM

## 2025-03-25 DIAGNOSIS — M54.50 ACUTE BILATERAL LOW BACK PAIN WITHOUT SCIATICA: ICD-10-CM

## 2025-03-25 DIAGNOSIS — M62.81 MUSCLE WEAKNESS: Primary | ICD-10-CM

## 2025-03-25 PROCEDURE — 97112 NEUROMUSCULAR REEDUCATION: CPT

## 2025-03-25 PROCEDURE — 97140 MANUAL THERAPY 1/> REGIONS: CPT

## 2025-03-25 RX ORDER — LISINOPRIL 10 MG/1
10 TABLET ORAL DAILY
Qty: 90 TABLET | Refills: 0 | Status: SHIPPED | OUTPATIENT
Start: 2025-03-25 | End: 2025-03-26 | Stop reason: DRUGHIGH

## 2025-03-25 RX ORDER — ROSUVASTATIN CALCIUM 10 MG/1
10 TABLET, COATED ORAL NIGHTLY
Qty: 90 TABLET | Refills: 1 | Status: SHIPPED | OUTPATIENT
Start: 2025-03-25

## 2025-03-26 ENCOUNTER — LAB SERVICES (OUTPATIENT)
Dept: LAB | Age: 60
End: 2025-03-26

## 2025-03-26 ENCOUNTER — APPOINTMENT (OUTPATIENT)
Dept: INTERNAL MEDICINE | Age: 60
End: 2025-03-26

## 2025-03-26 ENCOUNTER — IMAGING SERVICES (OUTPATIENT)
Dept: GENERAL RADIOLOGY | Age: 60
End: 2025-03-26
Attending: SURGERY

## 2025-03-26 ENCOUNTER — TELEPHONE (OUTPATIENT)
Dept: PSYCHOLOGY | Age: 60
End: 2025-03-26

## 2025-03-26 VITALS
RESPIRATION RATE: 18 BRPM | BODY MASS INDEX: 23.63 KG/M2 | WEIGHT: 141.8 LBS | HEART RATE: 65 BPM | SYSTOLIC BLOOD PRESSURE: 106 MMHG | DIASTOLIC BLOOD PRESSURE: 72 MMHG | TEMPERATURE: 97.3 F | HEIGHT: 65 IN | OXYGEN SATURATION: 99 %

## 2025-03-26 DIAGNOSIS — M54.12 CERVICAL RADICULOPATHY: ICD-10-CM

## 2025-03-26 DIAGNOSIS — B02.9 HERPES ZOSTER WITHOUT COMPLICATION: ICD-10-CM

## 2025-03-26 DIAGNOSIS — M54.2 NECK PAIN ON RIGHT SIDE: ICD-10-CM

## 2025-03-26 DIAGNOSIS — R41.3 MEMORY IMPAIRMENT: ICD-10-CM

## 2025-03-26 DIAGNOSIS — R19.7 NAUSEA VOMITING AND DIARRHEA: ICD-10-CM

## 2025-03-26 DIAGNOSIS — I10 PRIMARY HYPERTENSION: ICD-10-CM

## 2025-03-26 DIAGNOSIS — R14.0 BLOATING: ICD-10-CM

## 2025-03-26 DIAGNOSIS — R11.2 NAUSEA VOMITING AND DIARRHEA: ICD-10-CM

## 2025-03-26 DIAGNOSIS — R41.3 MEMORY IMPAIRMENT: Primary | ICD-10-CM

## 2025-03-26 LAB
FOLATE SERPL-MCNC: 15.4 NG/ML
VIT B12 SERPL-MCNC: 816 PG/ML (ref 211–911)

## 2025-03-26 PROCEDURE — 74018 RADEX ABDOMEN 1 VIEW: CPT | Performed by: RADIOLOGY

## 2025-03-26 PROCEDURE — 3078F DIAST BP <80 MM HG: CPT | Performed by: INTERNAL MEDICINE

## 2025-03-26 PROCEDURE — 3074F SYST BP LT 130 MM HG: CPT | Performed by: INTERNAL MEDICINE

## 2025-03-26 PROCEDURE — 82746 ASSAY OF FOLIC ACID SERUM: CPT | Performed by: CLINICAL MEDICAL LABORATORY

## 2025-03-26 PROCEDURE — 82607 VITAMIN B-12: CPT | Performed by: CLINICAL MEDICAL LABORATORY

## 2025-03-26 PROCEDURE — 99214 OFFICE O/P EST MOD 30 MIN: CPT | Performed by: INTERNAL MEDICINE

## 2025-03-26 PROCEDURE — 36415 COLL VENOUS BLD VENIPUNCTURE: CPT | Performed by: INTERNAL MEDICINE

## 2025-03-26 PROCEDURE — 86592 SYPHILIS TEST NON-TREP QUAL: CPT | Performed by: CLINICAL MEDICAL LABORATORY

## 2025-03-26 RX ORDER — LISINOPRIL 5 MG/1
5 TABLET ORAL DAILY
Qty: 90 TABLET | Refills: 1 | Status: SHIPPED | OUTPATIENT
Start: 2025-03-26

## 2025-03-26 RX ORDER — GABAPENTIN 100 MG/1
200 CAPSULE ORAL NIGHTLY
Qty: 180 CAPSULE | Refills: 1 | Status: SHIPPED | OUTPATIENT
Start: 2025-03-26

## 2025-03-26 ASSESSMENT — PATIENT HEALTH QUESTIONNAIRE - PHQ9
1. LITTLE INTEREST OR PLEASURE IN DOING THINGS: NOT AT ALL
SUM OF ALL RESPONSES TO PHQ9 QUESTIONS 1 AND 2: 0
2. FEELING DOWN, DEPRESSED OR HOPELESS: NOT AT ALL
SUM OF ALL RESPONSES TO PHQ9 QUESTIONS 1 AND 2: 0
CLINICAL INTERPRETATION OF PHQ2 SCORE: NO FURTHER SCREENING NEEDED

## 2025-03-27 ENCOUNTER — APPOINTMENT (OUTPATIENT)
Dept: SURGERY | Age: 60
End: 2025-03-27

## 2025-03-27 VITALS — WEIGHT: 141 LBS | HEIGHT: 65 IN | TEMPERATURE: 96.6 F | BODY MASS INDEX: 23.49 KG/M2

## 2025-03-27 DIAGNOSIS — R06.83 SNORING: ICD-10-CM

## 2025-03-27 DIAGNOSIS — R14.0 BLOATING: Primary | ICD-10-CM

## 2025-03-27 DIAGNOSIS — G47.39 SLEEP APNEA-LIKE BEHAVIOR: ICD-10-CM

## 2025-03-27 LAB — RPR SER QL: NONREACTIVE

## 2025-03-31 ENCOUNTER — APPOINTMENT (OUTPATIENT)
Dept: PHYSICAL THERAPY | Age: 60
End: 2025-03-31
Attending: INTERNAL MEDICINE

## 2025-03-31 DIAGNOSIS — M62.81 MUSCLE WEAKNESS: Primary | ICD-10-CM

## 2025-03-31 DIAGNOSIS — M54.2 NECK PAIN ON LEFT SIDE: ICD-10-CM

## 2025-03-31 DIAGNOSIS — M54.50 ACUTE BILATERAL LOW BACK PAIN WITHOUT SCIATICA: ICD-10-CM

## 2025-03-31 DIAGNOSIS — M54.6 ACUTE MIDLINE THORACIC BACK PAIN: ICD-10-CM

## 2025-03-31 PROCEDURE — 97112 NEUROMUSCULAR REEDUCATION: CPT

## 2025-03-31 PROCEDURE — 97140 MANUAL THERAPY 1/> REGIONS: CPT

## 2025-04-01 ENCOUNTER — E-ADVICE (OUTPATIENT)
Dept: BEHAVIORAL HEALTH | Age: 60
End: 2025-04-01

## 2025-04-11 ENCOUNTER — APPOINTMENT (OUTPATIENT)
Dept: PHYSICAL THERAPY | Age: 60
End: 2025-04-11
Attending: INTERNAL MEDICINE

## 2025-04-11 DIAGNOSIS — M54.2 NECK PAIN ON LEFT SIDE: ICD-10-CM

## 2025-04-11 DIAGNOSIS — M54.6 ACUTE MIDLINE THORACIC BACK PAIN: ICD-10-CM

## 2025-04-11 DIAGNOSIS — M62.81 MUSCLE WEAKNESS: Primary | ICD-10-CM

## 2025-04-11 DIAGNOSIS — M54.50 ACUTE BILATERAL LOW BACK PAIN WITHOUT SCIATICA: ICD-10-CM

## 2025-04-14 ENCOUNTER — TELEPHONE (OUTPATIENT)
Dept: INTERNAL MEDICINE | Age: 60
End: 2025-04-14

## 2025-04-14 DIAGNOSIS — M54.12 CERVICAL RADICULOPATHY: ICD-10-CM

## 2025-04-14 DIAGNOSIS — M54.2 NECK PAIN ON RIGHT SIDE: Primary | ICD-10-CM

## 2025-04-14 DIAGNOSIS — B02.9 HERPES ZOSTER WITHOUT COMPLICATION: ICD-10-CM

## 2025-04-18 ENCOUNTER — APPOINTMENT (OUTPATIENT)
Dept: PHYSICAL THERAPY | Age: 60
End: 2025-04-18
Attending: INTERNAL MEDICINE

## 2025-04-18 DIAGNOSIS — M62.81 MUSCLE WEAKNESS: Primary | ICD-10-CM

## 2025-04-18 DIAGNOSIS — M54.50 ACUTE BILATERAL LOW BACK PAIN WITHOUT SCIATICA: ICD-10-CM

## 2025-04-18 DIAGNOSIS — M54.2 NECK PAIN ON LEFT SIDE: ICD-10-CM

## 2025-04-18 DIAGNOSIS — M54.6 ACUTE MIDLINE THORACIC BACK PAIN: ICD-10-CM

## 2025-04-18 PROCEDURE — 97112 NEUROMUSCULAR REEDUCATION: CPT

## 2025-04-18 PROCEDURE — 97140 MANUAL THERAPY 1/> REGIONS: CPT

## 2025-04-23 ENCOUNTER — IMAGING SERVICES (OUTPATIENT)
Dept: GENERAL RADIOLOGY | Age: 60
End: 2025-04-23
Attending: SURGERY

## 2025-04-23 DIAGNOSIS — R14.0 BLOATING: ICD-10-CM

## 2025-04-23 PROCEDURE — 74018 RADEX ABDOMEN 1 VIEW: CPT | Performed by: RADIOLOGY

## 2025-04-24 ENCOUNTER — RESULTS FOLLOW-UP (OUTPATIENT)
Dept: SURGERY | Age: 60
End: 2025-04-24

## 2025-04-24 ENCOUNTER — APPOINTMENT (OUTPATIENT)
Dept: SURGERY | Age: 60
End: 2025-04-24

## 2025-04-24 VITALS — TEMPERATURE: 96.4 F | HEIGHT: 65 IN | WEIGHT: 139 LBS | BODY MASS INDEX: 23.16 KG/M2

## 2025-04-24 DIAGNOSIS — R14.0 BLOATING: Primary | ICD-10-CM

## 2025-04-24 PROCEDURE — 99212 OFFICE O/P EST SF 10 MIN: CPT | Performed by: SURGERY

## 2025-04-25 ENCOUNTER — OFFICE VISIT (OUTPATIENT)
Dept: SLEEP MEDICINE | Age: 60
End: 2025-04-25

## 2025-04-25 VITALS
DIASTOLIC BLOOD PRESSURE: 72 MMHG | HEART RATE: 55 BPM | BODY MASS INDEX: 22.34 KG/M2 | TEMPERATURE: 97.8 F | HEIGHT: 66 IN | OXYGEN SATURATION: 100 % | WEIGHT: 139 LBS | SYSTOLIC BLOOD PRESSURE: 105 MMHG

## 2025-04-25 DIAGNOSIS — G47.33 OBSTRUCTIVE SLEEP APNEA (ADULT) (PEDIATRIC): Primary | ICD-10-CM

## 2025-04-25 PROCEDURE — 99204 OFFICE O/P NEW MOD 45 MIN: CPT

## 2025-04-25 PROCEDURE — 99202 OFFICE O/P NEW SF 15 MIN: CPT

## 2025-04-25 ASSESSMENT — ENCOUNTER SYMPTOMS
EYES NEGATIVE: 1
GASTROINTESTINAL NEGATIVE: 1
EXCESSIVE DAYTIME SLEEPINESS: 1
ENDOCRINE NEGATIVE: 1
INSOMNIA: 1
ALLERGIC/IMMUNOLOGIC NEGATIVE: 1
SLEEP DISTURBANCES DUE TO BREATHING: 1

## 2025-04-28 ENCOUNTER — APPOINTMENT (OUTPATIENT)
Dept: PHYSICAL THERAPY | Age: 60
End: 2025-04-28
Attending: INTERNAL MEDICINE

## 2025-04-28 DIAGNOSIS — M54.50 ACUTE BILATERAL LOW BACK PAIN WITHOUT SCIATICA: ICD-10-CM

## 2025-04-28 DIAGNOSIS — M62.81 MUSCLE WEAKNESS: Primary | ICD-10-CM

## 2025-04-28 DIAGNOSIS — M54.6 ACUTE MIDLINE THORACIC BACK PAIN: ICD-10-CM

## 2025-04-28 DIAGNOSIS — M54.2 NECK PAIN ON LEFT SIDE: ICD-10-CM

## 2025-04-28 PROCEDURE — 97112 NEUROMUSCULAR REEDUCATION: CPT

## 2025-04-28 PROCEDURE — 97140 MANUAL THERAPY 1/> REGIONS: CPT

## 2025-05-02 ENCOUNTER — TELEPHONE (OUTPATIENT)
Dept: CARDIOLOGY | Age: 60
End: 2025-05-02

## 2025-05-02 ENCOUNTER — RESULTS FOLLOW-UP (OUTPATIENT)
Dept: CARDIOLOGY | Age: 60
End: 2025-05-02

## 2025-05-09 ENCOUNTER — APPOINTMENT (OUTPATIENT)
Dept: PHYSICAL THERAPY | Age: 60
End: 2025-05-09
Attending: INTERNAL MEDICINE

## 2025-05-09 DIAGNOSIS — M54.50 ACUTE BILATERAL LOW BACK PAIN WITHOUT SCIATICA: ICD-10-CM

## 2025-05-09 DIAGNOSIS — M54.6 ACUTE MIDLINE THORACIC BACK PAIN: ICD-10-CM

## 2025-05-09 DIAGNOSIS — M54.2 NECK PAIN ON LEFT SIDE: ICD-10-CM

## 2025-05-09 DIAGNOSIS — M62.81 MUSCLE WEAKNESS: Primary | ICD-10-CM

## 2025-05-14 NOTE — PROGRESS NOTES
POCT COVID in process.   Sindy Torres is a 59 year old female presents today for follow-up on medical weight loss program for the treatment of overweight, obesity, or morbid obesity with hx of VSG in 5/2021 and associated HTN, Hyperlipidemia, OA, GERD.    S:  Current weight   Wt Readings from Last 6 Encounters:   05/15/25 137 lb (62.1 kg)   02/06/25 143 lb (64.9 kg)   12/18/24 152 lb (68.9 kg)   11/19/24 154 lb (69.9 kg)   09/04/24 167 lb (75.8 kg)   09/03/24 167 lb (75.8 kg)    AND BMI Body mass index is 22.8 kg/m²..    Patient has lost 6# since LOV 3 months ago. She has been consistent with medication. Continues to work with Dyyno for chronic nausea. Reduced paresthesia to bilateral feet and hands. Seeing pain specialist for neck and back pain. Has not yet repeated Vitamin B1 level. Has home sleep study scheduled. She reports her weight has maintained for the past 2 months.    Testing/consult completed since LOV: Dietician: Estimated caloric needs for weight loss: 1550 cals/d for 2 pounds/week weight loss.    Social hx and PMH reviewed. Employed from home full time.  with 3 kids, 1 set of twins. Good spousal support.    [facility] Follow Up    Question 5/15/2025  6:51 AM CDT - Filed by Patient   Please describe a success moment: Eating better   Please describe a challenging moment/needs for improvement: More veggies   Please complete this 24 hour food journal, listing everything you had to eat in the past day. Include the average time of day you ate these meals at    List foods, qty and prep for breakfast: Health bar   List foods, qty and prep for lunch. Fruit   List foods, qty and prep for dinner. Chicken and broccoli   List foods, qty and prep for snacks. Fruits   List the types and qty of fluids consumed Water   On average, how many meals did you eat out per week? 2   Exercise    How many days per week are you active or exercise 2   On average, how many days were anaerobic (strength/resistance) exercises  performed? 5   On average, how many days were aerobic (cardio) exercises performed? 1   Perceived level of exertion on a scale of 1-5, with 5 being very intense: 2   Stress    Average stress level on a scale of 1-10, with 10 being extremely stressed: 5   If greater than 5/1O how would you grade your coping mechanisms? moderate   Sleep hours and integrity    How many hours of uninterrupted sleep do you get a night: 6   Do you feel rested in the morning: No   If no, what may have been disrupting your sleep? Bathroom, and shoulder/neck pain   Please list any goal(s) for your next visit Maintain weight       REVIEW OF SYSTEMS:  GENERAL: feels well otherwise  LUNGS: denies shortness of breath with exertion  CARDIOVASCULAR: denies chest pain on exertion, denies palpitations or pedal edema  GI: denies abdominal pain.  No D/C. +GERD. See above  MUSCULOSKELETAL: see above  NEURO: denies headaches or dizziness. Migraines stable. Paresthesia to bilateral lower legs/feet. See above.  PSYCH: denies change in behavior or mood, denies feeling sad or depressed.    EXAM:  /70   Pulse 64   Resp 16   Ht 5' 5\" (1.651 m)   Wt 137 lb (62.1 kg)   LMP  (LMP Unknown)   BMI 22.80 kg/m²   GENERAL: well developed, well nourished, in no apparent distress  EYES: conjunctiva pink, sclera non icteric, PERRLA  LUNGS: CTA in all fields, breathing non labored  CARDIO: regular rhythm without murmur, normal S1 and S2 without clicks or gallops, no pedal edema.  GI: +BS  NEURO/MS: motor and sensory grossly intact  PSYCH: pleasant, cooperative, normal mood and affect    ASSESSMENT AND PLAN:  Encounter Diagnoses   Name Primary?    Encounter for therapeutic drug monitoring Yes    Overweight (BMI 25.0-29.9)     Hypercholesterolemia     Essential hypertension     History of morbid obesity              No orders of the defined types were placed in this encounter.      Meds & Refills for this Visit:  Requested Prescriptions      No prescriptions  requested or ordered in this encounter       Imaging & Consults:  None      Plan:  Patient has lost 6# since LOV 3 months ago on Wegovy 1.7 mg weekly with a total weight loss of 130# since initial consult on 5/21/2019 with initial weight of 267#.  Weight loss goal:  lose 40# in 6 months, 75# in 1 year. BP controlled. CPM- maintaining weight loss. Discussed switching to Zepbound 5 mg weekly in the future. Hx of Qsymia, Naltrexone, Fluoxetine. Vitamin B1 injection given by MA today. Recheck Vitamin B1 lab as previously ordered. Continue bariatric vitamin daily.   meditation to help support overall health. See patient instructions below for additional plans and patient counseling.    Patient Instructions   Continue making lifestyle changes that focus on good nutrition, regular exercise and stress management.    Medication Plan: Continue Wegovy at current dose.     Next steps to work on before next office visit include: Check non fasting lab Vitamin B1 level.     Biological Consciousness: Stress Management through Mindfulness and Body Awareness    by Maeve Rivas, MEd, NFPT, 200-YTT  OAC Winter 2016 @ [email or web address]    Stress can adversely affect our health and wellbeing, but what is actually taking place in the body that makes it so worrisome for our health? What exactly is stress, and how does it influence our biological systems? How can a feeling cause an injury or illness? Most importantly, how can chronic stress be managed in order to prevent or reverse negative health effects?    Through mindfulness and breath awareness, the functioning of our entire biological system is shifted, enhancing our mood, decision making skills and improving our chance of experiencing stress. It also improves our perception of stress, or our “health locus of control.”    Nearly all individuals experience acute or chronic  mental stress and accept it as a normal part of living in the current day and age. According to the American Psychological Association and the American [place] of Stress, the amount of perceived stress individuals face has increased exponentially in the past five years. Among the top stressors are money, work and personal health. In fact, 52 percent of Americans rated their health as a cause of stress.    Not only has the overall health of Americans steadily declined --as shown by recent research that indicates only 40 percent of Americans rated their health as very good or excellent -- but the level of perceived stress has increased three-fold. We have learned we have the greatest influence on our health, happiness and perceived stress than ever thought before.    Chronic stress has the ability to shift our biological functions from a state of wellness and balance, to a state of potential injury, illness and even chronic disease. Stress is commonly thought of as a negative occurrence leading to a range of uncomfortable emotions and symptoms. However, stress can also be the foundation for productivity and innovation. The main distinction lies within our perception and choice responses to the stress.    What is Stress then, If it Can be Both Helpful and Harmful?  Stress can be defined as any situation that disturbs the equilibrium of a living organism (including plants and animals) and forces them to adapt or change. Mental stress can be any situation in which environmental demands - internal demands or both - tax or exceed the adaptive resources of an individual, social system or physical tissue system.    Mental Stress  Mental stress is an internal experience; an experience that interferes with the entirety of our physiological makeup. Not only does stress challenge our mental capacity to adapt, as well as our current state of happiness, it also directly influences our homeostasis (balance). An individual’s  belief in the dominance they have over stress also plays a major factor in mental, physical and emotional responses to stress.    These beliefs are what form an individual’s “health locus of control” (the perceived control over health outcomes). Those with a high external health locus of control perceive stress and their response to stress as being out of their control, whereas those with a high internal health locus of control believe that stress responses are influenced at their will. Studies indicate that individuals who have a high external locus of control experience worse health outcomes than those who feel that their health is within their control.    In order for the physical effects of mental stress to be understood, we must start within our internal make up, originating with the tiniest of cells. The human body is made up of trillions of cells, each conducting a magnetic field of energy. The energetic pulse of excitable tissue (i.e. nerves, muscles, etc.) is essentially what brings life to our bodies. Internally, this force field stimulates the function of our metabolism, digestive system, heart rate, hormone production, immunity and even our nervous system’s responses.    The consequences of chronic stress on our energy systems can lead to unfavorable health effects. Symptoms of stress such as elevated heart rate, increased blood pressure, slowed metabolism, tension headaches and anxiety can lead to more severe chronic illnesses. Maintaining a constant level of stress for an extended period of time can trigger adverse health outcomes such as heart disease, weight gain, chronic migraines and even cancer, to name a few.    Physical Stress  So, how does a feeling resonate physically in the body, and what is taking place to decrease our health? The beginning of our internal reactions to stress originates in the brain. The hypothalamus, located in the lower midbrain, is responsible for our primal and  instinctual responses. When under stress, the hypothalamus first alarms the body of potential harm by sending nerve signals to the adrenal glands, located above the kidneys. These glands then release a surge of hormones including adrenaline (epinephrine), and cortisol. This communication prompts a series of physical responses including elevated heart rate, increased skeletal muscle blood flow and behavior activation.    Our primal instincts driven by the lower-mid brain take over functioning and instinctual decision making through the influence of the sympathetic nervous system (SNS) (which is responsible for the ‘fight or flight’ response). When operating through the SNS, numerous bodily systems are shut down to focus only on necessary functions. For instance, thyroid functioning is working at a slower pace, insulin production is decreased and blood flow to the digestive system is less, resulting in slower metabolism and reduced nutritional intake. When cortisol is being released during this state, fat storage is increased in order to ensure survival.    This phenomenon is a factor contributing to the current epidemic of obesity. In females, stress has also been linked with the disruption of the normal menstrual cycle. Prolonged exposure to stress can lead to severe sexual malfunction including the inability to ovulate, menstrual irregularities, infertility and the complete impairment of reproductive functions.    Managing Your Stress with Mindfulness  So, how can stress be managed, reduced or even eliminated, in order to reverse or halt the harmful effects of it? The power to influence your reactions to stress and build resilience can be achieved through mindfulness, self-awareness, acknowledging our own intuitive guidance system and practicing stress management techniques such as yoga and meditation, amongst others. Mind and body awareness allows individuals to influence their susceptibility to stress or trauma  on the physical body.    Understanding and expanding our health locus of control by challenging negative thoughts has long been used to decrease stress and improve quality of life. Fortunately, mindful awareness can be practiced and learned no matter where you are in your current state of stress! In this context, mindfulness refers in part to the heightened awareness of an outside stress as the first step toward relaxing, in a way that can minimize the effects of that stress on the body.    Practicing Breath Awareness  The foundation for a healthier condition of living can be achieved most readily through breath awareness. Simply taking the time to breathe with awareness has the power to shift our functioning from the SNS to the parasympathetic nervous system (PNS), the system responsible for the ‘rest and digest’ activity within the body. When in this state, the body is able to restore its normal function, which stops the production of stress hormones, lowers blood pressure, increases lung capacity and calms the nervous system.    The taxing demands of our environment make it difficult to believe that something as simple as breath focus can influence so powerfully our state of mind. However, breath awareness and meditation are some of the most solid tools available to conquer stress. Mindfulness involves stopping, paying attention and becoming aware of the present moment’s realities in a non-judgmental way.    Practicing deep breathing improves blood flow to the intuitive prefrontal cortex of the brain, thereby enhancing your mood and decision making skills instantaneously. When we are able to think more clearly, and reroute our energy in a positive direction, we experience the power of our own mental capacity and are offered a moment to choose a reaction to a given situation. This simple practice is unfortunately underutilized, even though it is available to everyone, every day. The self-awareness found in the  breath offers opportunities for reflection, contemplation, and guidance towards our own moral compass. When we’re self-aware, we understand the role we play in choosing our perceptions, and the deliberate creation of our own reality.    As we learn to shift our focus to what is under our control, rather than what is not, we enhance our internal health locus of control. This empowering shift reveals the choice we have concerning how long we decide to be confined by the emotional effects of stress, and is also determines the severity of our biological responses to the stress. Breath is the optimal place to begin when cultivating a greater sense of self, because it brings your mind into the present moment and away from fear of the future. Accompanied with exercise, positive social circles and nutritionally balanced diets, mindfulness fosters a greater quality of life.    Conclusion  The connection between stress and the onset or manifestation of physical ailments has more recently been accepted in Western culture. By expanding our health locus of control, the biological responses to stress and our influence of these responses through mindful breath awareness can significantly reduce our risks for the adverse health effects of stress. However, stress need not be present in order to increase your quality of life through mindfulness.    Mindfulness, mediation, a healthy active lifestyle and compassion for yourself encourages a joyful experience in life, and can be practiced during even the busiest times in your life. We are in control of how we perceive and respond to situations, and by practicing the shift of our focus from negative to positive while remaining in the present moment, we’ll see our lives be enriched through the power of choice with happiness beyond our greatest expectations.    Parker.      Medication use and SEs reviewed with patient.    Return in about 4 months (around 9/15/2025) for as  scheduled.    Patient verbalizes understanding.    DOCUMENTATION OF TIME SPENT: Code selection for this visit was based on time spent : 25 minutes on date of service in preparing to see the patient, obtaining and/or reviewing separately obtained history, performing a medically appropriate examination, counseling and educating the patient/family/caregiver, ordering medications or testing, referring and communicating with other healthcare providers, documenting clinical information in the electronic medical record, independently interpreting results and communicating results to the patient/family/caregiver and care coordination with the patient's other providers.      Answers submitted by the patient for this visit:  Medical Weight Loss Follow Up (Submitted on 5/15/2025)  If greater than 5/1O how would you grade your coping mechanisms?: moderate

## 2025-05-15 ENCOUNTER — OFFICE VISIT (OUTPATIENT)
Dept: INTERNAL MEDICINE CLINIC | Facility: CLINIC | Age: 60
End: 2025-05-15
Payer: COMMERCIAL

## 2025-05-15 VITALS
WEIGHT: 137 LBS | RESPIRATION RATE: 16 BRPM | SYSTOLIC BLOOD PRESSURE: 108 MMHG | HEIGHT: 65 IN | BODY MASS INDEX: 22.82 KG/M2 | HEART RATE: 64 BPM | DIASTOLIC BLOOD PRESSURE: 70 MMHG

## 2025-05-15 DIAGNOSIS — Z51.81 ENCOUNTER FOR THERAPEUTIC DRUG MONITORING: Primary | ICD-10-CM

## 2025-05-15 DIAGNOSIS — Z86.39 HISTORY OF MORBID OBESITY: ICD-10-CM

## 2025-05-15 DIAGNOSIS — I10 ESSENTIAL HYPERTENSION: ICD-10-CM

## 2025-05-15 DIAGNOSIS — E78.00 HYPERCHOLESTEROLEMIA: ICD-10-CM

## 2025-05-15 DIAGNOSIS — E66.3 OVERWEIGHT (BMI 25.0-29.9): ICD-10-CM

## 2025-05-15 PROCEDURE — 3008F BODY MASS INDEX DOCD: CPT | Performed by: NURSE PRACTITIONER

## 2025-05-15 PROCEDURE — 3078F DIAST BP <80 MM HG: CPT | Performed by: NURSE PRACTITIONER

## 2025-05-15 PROCEDURE — 3074F SYST BP LT 130 MM HG: CPT | Performed by: NURSE PRACTITIONER

## 2025-05-15 PROCEDURE — 99213 OFFICE O/P EST LOW 20 MIN: CPT | Performed by: NURSE PRACTITIONER

## 2025-05-15 RX ORDER — LISINOPRIL 5 MG/1
5 TABLET ORAL DAILY
COMMUNITY
Start: 2025-03-26

## 2025-05-15 NOTE — PATIENT INSTRUCTIONS
Continue making lifestyle changes that focus on good nutrition, regular exercise and stress management.    Medication Plan: Continue Wegovy at current dose.     Next steps to work on before next office visit include: Check non fasting lab Vitamin B1 level.     Biological Consciousness: Stress Management through Mindfulness and Body Awareness    by Maeve Rivas, MEd, NFPT, 200-YTT  OAC Winter 2016 @ [email or web address]    Stress can adversely affect our health and wellbeing, but what is actually taking place in the body that makes it so worrisome for our health? What exactly is stress, and how does it influence our biological systems? How can a feeling cause an injury or illness? Most importantly, how can chronic stress be managed in order to prevent or reverse negative health effects?    Through mindfulness and breath awareness, the functioning of our entire biological system is shifted, enhancing our mood, decision making skills and improving our chance of experiencing stress. It also improves our perception of stress, or our “health locus of control.”    Nearly all individuals experience acute or chronic mental stress and accept it as a normal part of living in the current day and age. According to the American Psychological Association and the American [place] of Stress, the amount of perceived stress individuals face has increased exponentially in the past five years. Among the top stressors are money, work and personal health. In fact, 52 percent of Americans rated their health as a cause of stress.    Not only has the overall health of Americans steadily declined --as shown by recent research that indicates only 40 percent of Americans rated their health as very good or excellent -- but the level of perceived stress has increased three-fold. We have learned we have the greatest influence on our health,  happiness and perceived stress than ever thought before.    Chronic stress has the ability to shift our biological functions from a state of wellness and balance, to a state of potential injury, illness and even chronic disease. Stress is commonly thought of as a negative occurrence leading to a range of uncomfortable emotions and symptoms. However, stress can also be the foundation for productivity and innovation. The main distinction lies within our perception and choice responses to the stress.    What is Stress then, If it Can be Both Helpful and Harmful?  Stress can be defined as any situation that disturbs the equilibrium of a living organism (including plants and animals) and forces them to adapt or change. Mental stress can be any situation in which environmental demands - internal demands or both - tax or exceed the adaptive resources of an individual, social system or physical tissue system.    Mental Stress  Mental stress is an internal experience; an experience that interferes with the entirety of our physiological makeup. Not only does stress challenge our mental capacity to adapt, as well as our current state of happiness, it also directly influences our homeostasis (balance). An individual’s belief in the dominance they have over stress also plays a major factor in mental, physical and emotional responses to stress.    These beliefs are what form an individual’s “health locus of control” (the perceived control over health outcomes). Those with a high external health locus of control perceive stress and their response to stress as being out of their control, whereas those with a high internal health locus of control believe that stress responses are influenced at their will. Studies indicate that individuals who have a high external locus of control experience worse health outcomes than those who feel that their health is within their control.    In order for the physical effects of mental stress to be  understood, we must start within our internal make up, originating with the tiniest of cells. The human body is made up of trillions of cells, each conducting a magnetic field of energy. The energetic pulse of excitable tissue (i.e. nerves, muscles, etc.) is essentially what brings life to our bodies. Internally, this force field stimulates the function of our metabolism, digestive system, heart rate, hormone production, immunity and even our nervous system’s responses.    The consequences of chronic stress on our energy systems can lead to unfavorable health effects. Symptoms of stress such as elevated heart rate, increased blood pressure, slowed metabolism, tension headaches and anxiety can lead to more severe chronic illnesses. Maintaining a constant level of stress for an extended period of time can trigger adverse health outcomes such as heart disease, weight gain, chronic migraines and even cancer, to name a few.    Physical Stress  So, how does a feeling resonate physically in the body, and what is taking place to decrease our health? The beginning of our internal reactions to stress originates in the brain. The hypothalamus, located in the lower midbrain, is responsible for our primal and instinctual responses. When under stress, the hypothalamus first alarms the body of potential harm by sending nerve signals to the adrenal glands, located above the kidneys. These glands then release a surge of hormones including adrenaline (epinephrine), and cortisol. This communication prompts a series of physical responses including elevated heart rate, increased skeletal muscle blood flow and behavior activation.    Our primal instincts driven by the lower-mid brain take over functioning and instinctual decision making through the influence of the sympathetic nervous system (SNS) (which is responsible for the ‘fight or flight’ response). When operating through the SNS, numerous bodily systems are shut down to focus only  on necessary functions. For instance, thyroid functioning is working at a slower pace, insulin production is decreased and blood flow to the digestive system is less, resulting in slower metabolism and reduced nutritional intake. When cortisol is being released during this state, fat storage is increased in order to ensure survival.    This phenomenon is a factor contributing to the current epidemic of obesity. In females, stress has also been linked with the disruption of the normal menstrual cycle. Prolonged exposure to stress can lead to severe sexual malfunction including the inability to ovulate, menstrual irregularities, infertility and the complete impairment of reproductive functions.    Managing Your Stress with Mindfulness  So, how can stress be managed, reduced or even eliminated, in order to reverse or halt the harmful effects of it? The power to influence your reactions to stress and build resilience can be achieved through mindfulness, self-awareness, acknowledging our own intuitive guidance system and practicing stress management techniques such as yoga and meditation, amongst others. Mind and body awareness allows individuals to influence their susceptibility to stress or trauma on the physical body.    Understanding and expanding our health locus of control by challenging negative thoughts has long been used to decrease stress and improve quality of life. Fortunately, mindful awareness can be practiced and learned no matter where you are in your current state of stress! In this context, mindfulness refers in part to the heightened awareness of an outside stress as the first step toward relaxing, in a way that can minimize the effects of that stress on the body.    Practicing Breath Awareness  The foundation for a healthier condition of living can be achieved most readily through breath awareness. Simply taking the time to breathe with awareness has the power to shift our functioning from the SNS to  the parasympathetic nervous system (PNS), the system responsible for the ‘rest and digest’ activity within the body. When in this state, the body is able to restore its normal function, which stops the production of stress hormones, lowers blood pressure, increases lung capacity and calms the nervous system.    The taxing demands of our environment make it difficult to believe that something as simple as breath focus can influence so powerfully our state of mind. However, breath awareness and meditation are some of the most solid tools available to conquer stress. Mindfulness involves stopping, paying attention and becoming aware of the present moment’s realities in a non-judgmental way.    Practicing deep breathing improves blood flow to the intuitive prefrontal cortex of the brain, thereby enhancing your mood and decision making skills instantaneously. When we are able to think more clearly, and reroute our energy in a positive direction, we experience the power of our own mental capacity and are offered a moment to choose a reaction to a given situation. This simple practice is unfortunately underutilized, even though it is available to everyone, every day. The self-awareness found in the breath offers opportunities for reflection, contemplation, and guidance towards our own moral compass. When we’re self-aware, we understand the role we play in choosing our perceptions, and the deliberate creation of our own reality.    As we learn to shift our focus to what is under our control, rather than what is not, we enhance our internal health locus of control. This empowering shift reveals the choice we have concerning how long we decide to be confined by the emotional effects of stress, and is also determines the severity of our biological responses to the stress. Breath is the optimal place to begin when cultivating a greater sense of self, because it brings your mind into the present moment and away from fear of the  future. Accompanied with exercise, positive social circles and nutritionally balanced diets, mindfulness fosters a greater quality of life.    Conclusion  The connection between stress and the onset or manifestation of physical ailments has more recently been accepted in Western culture. By expanding our health locus of control, the biological responses to stress and our influence of these responses through mindful breath awareness can significantly reduce our risks for the adverse health effects of stress. However, stress need not be present in order to increase your quality of life through mindfulness.    Mindfulness, mediation, a healthy active lifestyle and compassion for yourself encourages a joyful experience in life, and can be practiced during even the busiest times in your life. We are in control of how we perceive and respond to situations, and by practicing the shift of our focus from negative to positive while remaining in the present moment, we’ll see our lives be enriched through the power of choice with happiness beyond our greatest expectations.    Parker.

## 2025-05-16 ENCOUNTER — APPOINTMENT (OUTPATIENT)
Dept: PHYSICAL THERAPY | Age: 60
End: 2025-05-16
Attending: INTERNAL MEDICINE

## 2025-05-16 DIAGNOSIS — M54.6 ACUTE MIDLINE THORACIC BACK PAIN: ICD-10-CM

## 2025-05-16 DIAGNOSIS — M54.2 NECK PAIN ON LEFT SIDE: ICD-10-CM

## 2025-05-16 DIAGNOSIS — M62.81 MUSCLE WEAKNESS: Primary | ICD-10-CM

## 2025-05-16 DIAGNOSIS — M54.50 ACUTE BILATERAL LOW BACK PAIN WITHOUT SCIATICA: ICD-10-CM

## 2025-05-22 ENCOUNTER — RESULTS FOLLOW-UP (OUTPATIENT)
Dept: SURGERY | Age: 60
End: 2025-05-22

## 2025-05-22 ENCOUNTER — APPOINTMENT (OUTPATIENT)
Dept: SURGERY | Age: 60
End: 2025-05-22

## 2025-05-22 ENCOUNTER — IMAGING SERVICES (OUTPATIENT)
Dept: GENERAL RADIOLOGY | Age: 60
End: 2025-05-22
Attending: SURGERY

## 2025-05-22 VITALS — HEIGHT: 66 IN | BODY MASS INDEX: 22.02 KG/M2 | WEIGHT: 137 LBS | TEMPERATURE: 96.5 F

## 2025-05-22 DIAGNOSIS — R14.0 BLOATING: ICD-10-CM

## 2025-05-22 DIAGNOSIS — R14.0 BLOATING: Primary | ICD-10-CM

## 2025-05-22 PROCEDURE — 74018 RADEX ABDOMEN 1 VIEW: CPT | Performed by: RADIOLOGY

## 2025-05-22 PROCEDURE — 99212 OFFICE O/P EST SF 10 MIN: CPT | Performed by: SURGERY

## 2025-05-23 ENCOUNTER — APPOINTMENT (OUTPATIENT)
Dept: PAIN MANAGEMENT | Age: 60
End: 2025-05-23

## 2025-05-23 ENCOUNTER — OFFICE VISIT (OUTPATIENT)
Dept: SLEEP MEDICINE | Age: 60
End: 2025-05-23

## 2025-05-23 VITALS — HEIGHT: 66 IN | BODY MASS INDEX: 22.02 KG/M2 | WEIGHT: 137 LBS

## 2025-05-23 DIAGNOSIS — G89.29 CHRONIC RIGHT SHOULDER PAIN: ICD-10-CM

## 2025-05-23 DIAGNOSIS — M79.12 MYALGIA OF AUXILIARY MUSCLES, HEAD AND NECK: ICD-10-CM

## 2025-05-23 DIAGNOSIS — G47.19 EXCESSIVE DAYTIME SLEEPINESS: Primary | ICD-10-CM

## 2025-05-23 DIAGNOSIS — M54.12 CERVICAL RADICULOPATHY: Primary | ICD-10-CM

## 2025-05-23 DIAGNOSIS — M25.511 CHRONIC RIGHT SHOULDER PAIN: ICD-10-CM

## 2025-05-23 DIAGNOSIS — G47.33 OBSTRUCTIVE SLEEP APNEA (ADULT) (PEDIATRIC): ICD-10-CM

## 2025-05-23 PROCEDURE — 95800 SLP STDY UNATTENDED: CPT | Performed by: SPECIALIST

## 2025-05-23 PROCEDURE — 20552 NJX 1/MLT TRIGGER POINT 1/2: CPT | Performed by: NURSE PRACTITIONER

## 2025-05-23 PROCEDURE — 99203 OFFICE O/P NEW LOW 30 MIN: CPT | Performed by: NURSE PRACTITIONER

## 2025-05-23 RX ORDER — TRIAMCINOLONE ACETONIDE 40 MG/ML
40 INJECTION, SUSPENSION INTRA-ARTICULAR; INTRAMUSCULAR ONCE
Status: COMPLETED | OUTPATIENT
Start: 2025-05-23 | End: 2025-05-23

## 2025-05-23 RX ADMIN — TRIAMCINOLONE ACETONIDE 40 MG: 40 INJECTION, SUSPENSION INTRA-ARTICULAR; INTRAMUSCULAR at 09:35

## 2025-05-28 ENCOUNTER — RESULTS FOLLOW-UP (OUTPATIENT)
Dept: PAIN MANAGEMENT | Age: 60
End: 2025-05-28

## 2025-05-28 ENCOUNTER — HOSPITAL ENCOUNTER (OUTPATIENT)
Age: 60
Discharge: HOME | End: 2025-05-28
Attending: NURSE PRACTITIONER

## 2025-05-28 DIAGNOSIS — M54.12 CERVICAL RADICULOPATHY: ICD-10-CM

## 2025-05-28 PROCEDURE — 72141 MRI NECK SPINE W/O DYE: CPT

## 2025-05-29 ENCOUNTER — APPOINTMENT (OUTPATIENT)
Dept: PHYSICAL THERAPY | Age: 60
End: 2025-05-29
Attending: INTERNAL MEDICINE

## 2025-05-29 DIAGNOSIS — M54.6 ACUTE MIDLINE THORACIC BACK PAIN: ICD-10-CM

## 2025-05-29 DIAGNOSIS — M54.50 ACUTE BILATERAL LOW BACK PAIN WITHOUT SCIATICA: ICD-10-CM

## 2025-05-29 DIAGNOSIS — M54.2 NECK PAIN ON LEFT SIDE: ICD-10-CM

## 2025-05-29 DIAGNOSIS — M62.81 MUSCLE WEAKNESS: Primary | ICD-10-CM

## 2025-05-30 ENCOUNTER — OFFICE VISIT (OUTPATIENT)
Dept: SLEEP MEDICINE | Age: 60
End: 2025-05-30

## 2025-05-30 VITALS
WEIGHT: 136 LBS | OXYGEN SATURATION: 100 % | HEIGHT: 66 IN | DIASTOLIC BLOOD PRESSURE: 66 MMHG | HEART RATE: 57 BPM | TEMPERATURE: 97.9 F | BODY MASS INDEX: 21.86 KG/M2 | SYSTOLIC BLOOD PRESSURE: 99 MMHG

## 2025-05-30 DIAGNOSIS — G47.33 OBSTRUCTIVE SLEEP APNEA (ADULT) (PEDIATRIC): Primary | ICD-10-CM

## 2025-05-30 PROCEDURE — 99211 OFF/OP EST MAY X REQ PHY/QHP: CPT

## 2025-05-30 PROCEDURE — 99214 OFFICE O/P EST MOD 30 MIN: CPT

## 2025-05-30 RX ORDER — SEMAGLUTIDE 1.7 MG/.75ML
INJECTION, SOLUTION SUBCUTANEOUS
COMMUNITY
Start: 2025-05-15

## 2025-05-30 ASSESSMENT — SLEEP AND FATIGUE QUESTIONNAIRES
HOW LIKELY ARE YOU TO NOD OFF OR FALL ASLEEP WHILE SITTING QUIETLY AFTER LUNCH WITHOUT ALCOHOL: SLIGHT CHANCE OF DOZING
HOW LIKELY ARE YOU TO NOD OFF OR FALL ASLEEP WHILE SITTING INACTIVE IN A PUBLIC PLACE: SLIGHT CHANCE OF DOZING
HOW LIKELY ARE YOU TO NOD OFF OR FALL ASLEEP WHILE SITTING AND TALKING TO SOMEONE: SLIGHT CHANCE OF DOZING
HOW LIKELY ARE YOU TO NOD OFF OR FALL ASLEEP WHILE LYING DOWN TO REST IN THE AFTERNOON WHEN CIRCUMSTANCES PERMIT: SLIGHT CHANCE OF DOZING
ESS TOTAL SCORE: 11
HOW LIKELY ARE YOU TO NOD OFF OR FALL ASLEEP IN A CAR, WHILE STOPPED FOR A FEW MINUTES IN TRAFFIC: SLIGHT CHANCE OF DOZING
HOW LIKELY ARE YOU TO NOD OFF OR FALL ASLEEP WHEN YOU ARE A PASSENGER IN A CAR FOR AN HOUR WITHOUT A BREAK: HIGH CHANCE OF DOZING
HOW LIKELY ARE YOU TO NOD OFF OR FALL ASLEEP WHILE WATCHING TV: MODERATE CHANCE OF DOZING
HOW LIKELY ARE YOU TO NOD OFF OR FALL ASLEEP WHILE SITTING AND READING: SLIGHT CHANCE OF DOZING

## 2025-06-05 DIAGNOSIS — G43.009 MIGRAINE WITHOUT AURA AND WITHOUT STATUS MIGRAINOSUS, NOT INTRACTABLE: ICD-10-CM

## 2025-06-06 RX ORDER — BUTALBITAL, ACETAMINOPHEN AND CAFFEINE 50; 325; 40 MG/1; MG/1; MG/1
1 TABLET ORAL EVERY 6 HOURS PRN
Qty: 30 TABLET | Refills: 0 | Status: SHIPPED | OUTPATIENT
Start: 2025-06-06

## 2025-06-10 ENCOUNTER — APPOINTMENT (OUTPATIENT)
Dept: PAIN MANAGEMENT | Age: 60
End: 2025-06-10

## 2025-06-10 VITALS — WEIGHT: 130 LBS | HEIGHT: 66 IN | BODY MASS INDEX: 20.89 KG/M2

## 2025-06-10 DIAGNOSIS — M54.2 NECK PAIN: Primary | ICD-10-CM

## 2025-06-10 DIAGNOSIS — M79.601 RIGHT ARM PAIN: ICD-10-CM

## 2025-06-10 DIAGNOSIS — G89.29 CHRONIC RIGHT SHOULDER PAIN: ICD-10-CM

## 2025-06-10 DIAGNOSIS — M25.511 CHRONIC RIGHT SHOULDER PAIN: ICD-10-CM

## 2025-06-10 DIAGNOSIS — M54.12 CERVICAL RADICULOPATHY: ICD-10-CM

## 2025-06-11 ENCOUNTER — APPOINTMENT (OUTPATIENT)
Dept: PHYSICAL THERAPY | Age: 60
End: 2025-06-11
Attending: INTERNAL MEDICINE

## 2025-06-11 DIAGNOSIS — M54.2 NECK PAIN ON LEFT SIDE: ICD-10-CM

## 2025-06-11 DIAGNOSIS — M54.50 ACUTE BILATERAL LOW BACK PAIN WITHOUT SCIATICA: ICD-10-CM

## 2025-06-11 DIAGNOSIS — M62.81 MUSCLE WEAKNESS: Primary | ICD-10-CM

## 2025-06-11 DIAGNOSIS — M54.6 ACUTE MIDLINE THORACIC BACK PAIN: ICD-10-CM

## 2025-06-17 ENCOUNTER — OFFICE VISIT (OUTPATIENT)
Dept: SURGERY | Facility: CLINIC | Age: 60
End: 2025-06-17
Payer: COMMERCIAL

## 2025-06-17 VITALS — HEIGHT: 66 IN | WEIGHT: 128.31 LBS | BODY MASS INDEX: 20.62 KG/M2

## 2025-06-17 DIAGNOSIS — Z86.39 HISTORY OF MORBID OBESITY: Primary | ICD-10-CM

## 2025-06-17 DIAGNOSIS — Z90.3 HISTORY OF SLEEVE GASTRECTOMY: ICD-10-CM

## 2025-06-17 NOTE — PATIENT INSTRUCTIONS
Recommendations/goals:      1.  Restart the food record, My Net Diary, select the macros dashboard.  2.  Add some protein at breakfast like cheese with berries or at a snack. Strive to consume at least 4-6 meals/snacks per day.  Include protein and produce when you eat.  Aim for 75-93 grams of protein per day.  Try to keep the carbohydrates at 100-120 grams per day or less.    3.  Continue to practice eating strategies, eat separately from drinking-wait 30 minutes after eating to drink, avoid straws and chewing gum, use smaller plates/bowls/utensils, chew food 20-30 times before swallowing.  Make the meals last 30 minutes.  4.  Continue to aim for 64 oz per day of water.  (Try adding lemon, mint or fruit to water, Protein 2 O water, add True Lemon, Crystal Light, use a measured container).  5.  Continue to avoid caffeine and carbonation.  6.  Exercise with a goal of 30 minutes per day for exercise (for example,walking).  (I burn 4.2 calories per minute of walking.)  7.  Continue to strength training at least 10 minutes 3 days per week.  (7 minute workout song on YouTube) or (Gym Rat no more-18 at home exercises) Focus on overall body especially arms and trunk.  8.  Switch over to Exie with 1200 -1500 mg calcium citrate per day.

## 2025-06-17 NOTE — PROGRESS NOTES
[facility]  [address]  [place] [address]  Dept: [phone]  Loc: [phone]  Body Composition Analysis #3     1. Weight 128.2 lbs     2. BMI 20.7     3. BMR 1288 kcal     4. Percent body fat 26.9% (34.6 lbs)     5. Visceral fat level 7 (gaol is less than 10)     6. ECW/TBW 0.400     7. SMM (skeletal muscle mass) 49.4 lbs                  Lean body mass for arms (R & L) 4.10 lbs, 87.6% & 4.23 lbs, 90.4%                  Lean body mass for trunk 39.1 lbs, 91.8%                   Lean body mass for legs (R & L) 14.79 lbs, 99.8% & 14.84 lbs, 100%     8. Body fat mass 34.6 lbs      9. Recommended Body fat amount loss 4 lbs/lean body mass gain of 8.8 lbs    10. Recommendations/Status see below       06/17/25      Bariatric Follow-up Nutrition Session    Sindy Torres is a 59 year old female.     Assessment     Procedure:  Vertical Sleeve Gastrectomy  Here for medical weight management: yes  Revision:  no  Surgery Date:  5/17/24  Medical Diagnosis:  healthy weight     Labs:  Lab Results   Component Value Date    GLU 64 (A) 11/19/2024    BUN 9 05/24/2022    BUNCREA 18.6 05/18/2021    CREATSERUM 0.71 05/24/2022    ANIONGAP 6 05/24/2022    GFRNAA 96 05/24/2022    GFRAA 111 05/24/2022    CA 9.2 05/24/2022    OSMOCALC 289 05/24/2022    ALKPHO 65 05/24/2022    AST 13 (L) 05/24/2022    ALT 20 05/24/2022    BILT 0.4 05/24/2022    TP 6.7 05/24/2022    ALB 3.4 05/24/2022    GLOBULIN 3.3 05/24/2022     05/24/2022    K 3.8 05/24/2022     05/24/2022    CO2 25.0 05/24/2022     Lab Results   Component Value Date    MG 2.0 08/26/2021      Phosphorus (mg/dL)   Date Value   08/26/2021 3.7       Thyroid:    Lab Results   Component Value Date    TSH 1.280 05/24/2022    TSH 1.940 08/26/2021    T4F 0.8 05/24/2022    T4F 0.9 08/26/2021       Iron Panel:  Lab Results   Component Value Date    IRON 58 05/24/2022    TRANSFERRIN 235 05/24/2022    TIBCP 350  05/24/2022    SAT 17 05/24/2022       CBC:  Lab Results   Component Value Date    WBC 6.3 05/24/2022    WBC 5.8 08/26/2021    WBC 8.1 05/18/2021     Lab Results   Component Value Date    HGB 11.7 (L) 05/24/2022    HGB 12.0 08/26/2021    HGB 10.6 (L) 05/18/2021      Lab Results   Component Value Date    .0 05/24/2022    .0 08/26/2021    .0 05/18/2021       Diabetes:    Lab Results   Component Value Date     08/26/2021    A1C 5.5 08/26/2021       Lipid Panel:  Lab Results   Component Value Date    CHOLEST 151 11/19/2024    TRIG 45 11/19/2024    HDL 75 (A) 11/19/2024     11/19/2024    VLDL 12 08/26/2021    NONHDLC 136 (H) 08/26/2021    CHOLHDLRATIO 2 11/19/2024        Vitamins/Minerals:  Lab Results   Component Value Date    B12 1,491 (H) 05/24/2022     Lab Results   Component Value Date    VITD 61.6 05/24/2022     Lab Results   Component Value Date/Time    THIAMINE 143 05/24/2022 11:14 AM      Lab Results   Component Value Date/Time    VITB1 97.9 12/26/2024 10:31 AM     Lab Results   Component Value Date/Time    FOLIC >100.0 05/24/2022 11:14 AM        Meds:   Medications - Current[1]    Height:    Ht Readings from Last 1 Encounters:   05/15/25 5' 5\" (1.651 m)     Weight:    Wt Readings from Last 6 Encounters:   05/15/25 137 lb   02/06/25 143 lb   12/18/24 152 lb   11/19/24 154 lb   09/04/24 167 lb   09/03/24 167 lb       Weight change:  down 145.7 lbs  Post-Op Excess Body Weight Loss: 120 % EBWL  BMI: There is no height or weight on file to calculate BMI.    Protein Intake: ~37 grams/day  Fluid intake:  64+ ounces/day  Works 2:30-11 pm  Diet history:       Breakfast      AM Snack       Lunch     PM Snack     Dinner   9-10 am- chicken sausage with berries or Kellogs multigrain bar-11 gm protein skips 1-2 /7 days  skips 2 pm- turkey slices and cheese and Kellog bar or dinner from night before  spaghetti with grd turkey sauce    Skips or fruit or Protein bar and Water with metamucil and  miralax 8-9 pm-1/4 c rice with 1.5 oz chicken or fish and vegetable kabobs                Total Calories:  unsure not currently keeping a food record  Excessive in: nothing  Inadequate in:  protein, vegetables, and fiber     Patient has made some modifications and adjustments to diet: yes, per pt is eating separately from drinking-waiting 30 minutes after eating to drink, is chewing food well before swallowing, eating more slowly, is eating smaller portions, is eating more protein, is eating at least 3-4 times per day,  is eating 2-3 servings of fruit per day and 1 serving of vegetables per day.   Food intolerances:   lettuce, carrots, celery - will vomit   Vitamin/mineral supplements:  Other: Barimelts  Protein supplements:  Other no-make her sick     Activity Level: active  Type: walk and strength training with physical therapy once per week  45 minutes and plans to work with  again  Duration: 10 minutes  Frequency: per day    Other:   Met with pt for 4 year post op visit.  Pt is 120% EBWL s/p VSG and H/H repair  with Dr. Mariscal.  Pt is being followed by Daysi Mendez. Per pt is not currently taking any medication for weight loss, had been taking wegovy but stopped due to constipation.  Pt reminded to get B12 lab. Pt verbalized understanding, will get after July 1st . Per pt is not currently keeping a food record. Per pt is consuming ~37 gm protein per day.   Current diet is not meeting protein needs and is low in vegetables.  Discussed benefits of including protein and produce at all meals and snacks.  Provided ideas and recipes.  Pt verbalized understanding.  Per pt is taking bariatric vitamin daily, Barimelts. Encouraged pt to switch over to [facility] with additional calcium citrate.  Pt verbalized understanding.  Per pt is tolerating all foods, except lettuce, carrots and celery causes her to vomit.  Per pt is drinking at least 64 oz of water per day.  Per pt is exercising regularly,  getting back to walking-is at about 10 minutes per day.  Per pt is doing strength training during physical therapy 45 minutes per week with goal to return to  once per week. Reviewed body comp analysis.  Encouraged pt tp focus on overall body especially arms and trunk.  Pt verbalized understanding.  Pt with follow up visit scheduled for 6 months to ensure meeting goals.       Nutrition Diagnosis     Nutrition Diagnosis: Morbid obesity: Improvement shown     Intervention     Recommendations/goals:      1.  Restart the food record, My Net Diary, select the macros dashboard.  2.  Add some protein at breakfast like cheese with berries or at a snack. Strive to consume at least 4-6 meals/snacks per day.  Include protein and produce when you eat.  Aim for 75-93 grams of protein per day.  Try to keep the carbohydrates at 100-120 grams per day or less.    3.  Continue to practice eating strategies, eat separately from drinking-wait 30 minutes after eating to drink, avoid straws and chewing gum, use smaller plates/bowls/utensils, chew food 20-30 times before swallowing.  Make the meals last 30 minutes.  4.  Continue to aim for 64 oz per day of water.  (Try adding lemon, mint or fruit to water, Protein 2 O water, add True Lemon, Crystal Light, use a measured container).  5.  Continue to avoid caffeine and carbonation.  6.  Exercise with a goal of 30 minutes per day for exercise (for example,walking).  (I burn 4.2 calories per minute of walking.)  7.  Continue to strength training at least 10 minutes 3 days per week.  (7 minute workout song on YouTube) or (Gym Rat no more-18 at home exercises) Focus on overall body especially arms and trunk.  8.  Switch over to [employer] with 1200 -1500 mg calcium citrate.      Monitor/Evaluate     Anthropometric measurements, Food/fluid intake/choices, Food intolerances, Activity level, Vitamin/mineral supplementation, Reinforce goals, and Calorie/protein  intake  Additional RD visits required to review concepts? yes  Patient understands protein requirements? yes  Patient understand fluid requirements (amount and method of intake)? yes  Patient understands post-operative diet? yes  Patient keeping consistent food records? no  Patient ready for Liquid Protein Education? N/a      Lakshmi Ferreira, RD, LDN    Face-to-face time spent with pt: 61 minutes             [1]   Current Outpatient Medications:     lisinopril 5 MG Oral Tab, Take 1 tablet (5 mg total) by mouth daily., Disp: , Rfl:     semaglutide-weight management 1.7 MG/0.75ML Subcutaneous Solution Auto-injector, Inject 0.75 mL (1.7 mg total) into the skin once a week., Disp: 9 mL, Rfl: 1    gabapentin 100 MG Oral Cap, Take 1 capsule (100 mg total) by mouth nightly., Disp: , Rfl:     ondansetron 4 MG Oral Tablet Dispersible, Place 1 tablet (4 mg total) under the tongue every 8 (eight) hours as needed., Disp: , Rfl:     tiZANidine 2 MG Oral Tab, Take 1 tablet (2 mg total) by mouth nightly as needed., Disp: , Rfl:     butalbital-acetaminophen-caffeine -40 MG Oral Tab, Take 1 tablet by mouth every 6 (six) hours as needed., Disp: , Rfl:     cetirizine 10 MG Oral Tab, Take 1 tablet (10 mg total) by mouth As Directed., Disp: , Rfl:     rosuvastatin 10 MG Oral Tab, , Disp: , Rfl:     Pantoprazole Sodium 40 MG Oral Tab EC, Take 1 tablet (40 mg total) by mouth every morning before breakfast., Disp: 90 tablet, Rfl: 0    Multiple Vitamins-Minerals (BARIATRIC FUSION) Oral Chew Tab, Chew by mouth., Disp: , Rfl:

## 2025-06-27 ENCOUNTER — APPOINTMENT (OUTPATIENT)
Dept: PHYSICAL THERAPY | Age: 60
End: 2025-06-27
Attending: INTERNAL MEDICINE

## 2025-06-27 DIAGNOSIS — M54.6 ACUTE MIDLINE THORACIC BACK PAIN: ICD-10-CM

## 2025-06-27 DIAGNOSIS — M54.2 NECK PAIN ON LEFT SIDE: ICD-10-CM

## 2025-06-27 DIAGNOSIS — M54.50 ACUTE BILATERAL LOW BACK PAIN WITHOUT SCIATICA: ICD-10-CM

## 2025-06-27 DIAGNOSIS — M62.81 MUSCLE WEAKNESS: Primary | ICD-10-CM

## 2025-07-07 SDOH — ECONOMIC STABILITY: HOUSING INSECURITY: DO YOU HAVE PROBLEMS WITH ANY OF THE FOLLOWING?: NONE OF THE ABOVE

## 2025-07-07 SDOH — ECONOMIC STABILITY: FOOD INSECURITY: WITHIN THE PAST 12 MONTHS, THE FOOD YOU BOUGHT JUST DIDN'T LAST AND YOU DIDN'T HAVE MONEY TO GET MORE.: NEVER TRUE

## 2025-07-07 SDOH — ECONOMIC STABILITY: HOUSING INSECURITY: WHAT IS YOUR LIVING SITUATION TODAY?: I HAVE A STEADY PLACE TO LIVE

## 2025-07-07 ASSESSMENT — SOCIAL DETERMINANTS OF HEALTH (SDOH): IN THE PAST 12 MONTHS, HAS THE ELECTRIC, GAS, OIL, OR WATER COMPANY THREATENED TO SHUT OFF SERVICE IN YOUR HOME?: NO

## 2025-07-09 ENCOUNTER — APPOINTMENT (OUTPATIENT)
Dept: INTERNAL MEDICINE | Age: 60
End: 2025-07-09

## 2025-07-09 ENCOUNTER — RESULTS FOLLOW-UP (OUTPATIENT)
Dept: INTERNAL MEDICINE | Age: 60
End: 2025-07-09

## 2025-07-09 ENCOUNTER — LAB SERVICES (OUTPATIENT)
Dept: LAB | Age: 60
End: 2025-07-09

## 2025-07-09 VITALS
BODY MASS INDEX: 20.73 KG/M2 | SYSTOLIC BLOOD PRESSURE: 90 MMHG | DIASTOLIC BLOOD PRESSURE: 60 MMHG | WEIGHT: 129 LBS | HEIGHT: 66 IN | RESPIRATION RATE: 16 BRPM | OXYGEN SATURATION: 98 % | TEMPERATURE: 96.7 F | HEART RATE: 52 BPM

## 2025-07-09 DIAGNOSIS — R42 DIZZINESS: Primary | ICD-10-CM

## 2025-07-09 DIAGNOSIS — R42 DIZZINESS: ICD-10-CM

## 2025-07-09 DIAGNOSIS — F32.89 OTHER DEPRESSION: ICD-10-CM

## 2025-07-09 DIAGNOSIS — D64.9 NORMOCYTIC ANEMIA: Primary | ICD-10-CM

## 2025-07-09 DIAGNOSIS — D64.9 NORMOCYTIC ANEMIA: ICD-10-CM

## 2025-07-09 DIAGNOSIS — E78.2 HYPERLIPIDEMIA, MIXED: ICD-10-CM

## 2025-07-09 LAB
ALBUMIN SERPL-MCNC: 3.6 G/DL (ref 3.4–5)
ALBUMIN/GLOB SERPL: 1.3 {RATIO} (ref 1–2.4)
ALP SERPL-CCNC: 52 UNITS/L (ref 45–117)
ALT SERPL-CCNC: 29 UNITS/L
ANION GAP SERPL CALC-SCNC: 13 MMOL/L (ref 7–19)
AST SERPL-CCNC: 18 UNITS/L
BASOPHILS # BLD: 0.1 K/MCL (ref 0–0.3)
BASOPHILS NFR BLD: 1 %
BILIRUB SERPL-MCNC: 0.3 MG/DL (ref 0.2–1)
BUN SERPL-MCNC: 16 MG/DL (ref 6–20)
BUN/CREAT SERPL: 17 (ref 7–25)
CALCIUM SERPL-MCNC: 9.3 MG/DL (ref 8.4–10.2)
CHLORIDE SERPL-SCNC: 107 MMOL/L (ref 97–110)
CHOLEST SERPL-MCNC: 152 MG/DL
CHOLEST/HDLC SERPL: 1.8 {RATIO}
CO2 SERPL-SCNC: 30 MMOL/L (ref 21–32)
CREAT SERPL-MCNC: 0.96 MG/DL (ref 0.51–0.95)
DEPRECATED RDW RBC: 44.7 FL (ref 39–50)
EGFRCR SERPLBLD CKD-EPI 2021: 69 ML/MIN/{1.73_M2}
EOSINOPHIL # BLD: 0.1 K/MCL (ref 0–0.5)
EOSINOPHIL NFR BLD: 3 %
ERYTHROCYTE [DISTWIDTH] IN BLOOD: 12.9 % (ref 11–15)
FASTING DURATION TIME PATIENT: ABNORMAL H
GLOBULIN SER-MCNC: 2.8 G/DL (ref 2–4)
GLUCOSE SERPL-MCNC: 72 MG/DL (ref 70–99)
HBA1C MFR BLD: 5.1 % (ref 4.5–5.6)
HCT VFR BLD CALC: 35.5 % (ref 36–46.5)
HDLC SERPL-MCNC: 84 MG/DL
HGB BLD-MCNC: 11.5 G/DL (ref 12–15.5)
IMM GRANULOCYTES # BLD AUTO: 0 K/MCL (ref 0–0.2)
IMM GRANULOCYTES # BLD: 0 %
LDLC SERPL CALC-MCNC: 63 MG/DL
LYMPHOCYTES # BLD: 2.6 K/MCL (ref 1–4)
LYMPHOCYTES NFR BLD: 47 %
MCH RBC QN AUTO: 30.7 PG (ref 26–34)
MCHC RBC AUTO-ENTMCNC: 32.4 G/DL (ref 32–36.5)
MCV RBC AUTO: 94.9 FL (ref 78–100)
MONOCYTES # BLD: 0.4 K/MCL (ref 0.3–0.9)
MONOCYTES NFR BLD: 7 %
NEUTROPHILS # BLD: 2.3 K/MCL (ref 1.8–7.7)
NEUTROPHILS NFR BLD: 42 %
NONHDLC SERPL-MCNC: 68 MG/DL
NRBC BLD MANUAL-RTO: 0 /100 WBC
PLATELET # BLD AUTO: 316 K/MCL (ref 140–450)
POTASSIUM SERPL-SCNC: 4.5 MMOL/L (ref 3.4–5.1)
PROT SERPL-MCNC: 6.4 G/DL (ref 6.4–8.2)
RBC # BLD: 3.74 MIL/MCL (ref 4–5.2)
SODIUM SERPL-SCNC: 145 MMOL/L (ref 135–145)
TRIGL SERPL-MCNC: 26 MG/DL
TSH SERPL-ACNC: 1.98 MCUNITS/ML (ref 0.35–5)
WBC # BLD: 5.4 K/MCL (ref 4.2–11)

## 2025-07-09 PROCEDURE — 80061 LIPID PANEL: CPT | Performed by: INTERNAL MEDICINE

## 2025-07-09 PROCEDURE — 82728 ASSAY OF FERRITIN: CPT | Performed by: INTERNAL MEDICINE

## 2025-07-09 PROCEDURE — 82746 ASSAY OF FOLIC ACID SERUM: CPT | Performed by: CLINICAL MEDICAL LABORATORY

## 2025-07-09 PROCEDURE — 82607 VITAMIN B-12: CPT | Performed by: CLINICAL MEDICAL LABORATORY

## 2025-07-09 PROCEDURE — 83036 HEMOGLOBIN GLYCOSYLATED A1C: CPT | Performed by: INTERNAL MEDICINE

## 2025-07-09 PROCEDURE — 80050 GENERAL HEALTH PANEL: CPT | Performed by: INTERNAL MEDICINE

## 2025-07-09 PROCEDURE — 99214 OFFICE O/P EST MOD 30 MIN: CPT | Performed by: INTERNAL MEDICINE

## 2025-07-09 PROCEDURE — 83550 IRON BINDING TEST: CPT | Performed by: INTERNAL MEDICINE

## 2025-07-09 PROCEDURE — 83540 ASSAY OF IRON: CPT | Performed by: INTERNAL MEDICINE

## 2025-07-09 PROCEDURE — 36415 COLL VENOUS BLD VENIPUNCTURE: CPT | Performed by: INTERNAL MEDICINE

## 2025-07-09 RX ORDER — BUPROPION HYDROCHLORIDE 150 MG/1
150 TABLET ORAL DAILY
Qty: 30 TABLET | Refills: 1 | Status: SHIPPED | OUTPATIENT
Start: 2025-07-09

## 2025-07-09 SDOH — HEALTH STABILITY: PHYSICAL HEALTH: ON AVERAGE, HOW MANY MINUTES DO YOU ENGAGE IN EXERCISE AT THIS LEVEL?: 30 MIN

## 2025-07-09 SDOH — HEALTH STABILITY: PHYSICAL HEALTH: ON AVERAGE, HOW MANY DAYS PER WEEK DO YOU ENGAGE IN MODERATE TO STRENUOUS EXERCISE (LIKE A BRISK WALK)?: 2 DAYS

## 2025-07-09 ASSESSMENT — PATIENT HEALTH QUESTIONNAIRE - PHQ9
5. POOR APPETITE, WEIGHT LOSS, OR OVEREATING: NOT AT ALL
SUM OF ALL RESPONSES TO PHQ QUESTIONS 1-9: 10
4. FEELING TIRED OR HAVING LITTLE ENERGY: NEARLY EVERY DAY
CLINICAL INTERPRETATION OF PHQ2 SCORE: FURTHER SCREENING NEEDED
SUM OF ALL RESPONSES TO PHQ9 QUESTIONS 1 AND 2: 3
6. FEELING BAD ABOUT YOURSELF - OR THAT YOU ARE A FAILURE OR HAVE LET YOURSELF OR YOUR FAMILY DOWN: MORE THAN HALF THE DAYS
2. FEELING DOWN, DEPRESSED OR HOPELESS: NEARLY EVERY DAY
SUM OF ALL RESPONSES TO PHQ9 QUESTIONS 1 AND 2: 3
3. TROUBLE FALLING OR STAYING ASLEEP OR SLEEPING TOO MUCH: SEVERAL DAYS
1. LITTLE INTEREST OR PLEASURE IN DOING THINGS: NOT AT ALL
10. IF YOU CHECKED OFF ANY PROBLEMS, HOW DIFFICULT HAVE THESE PROBLEMS MADE IT FOR YOU TO DO YOUR WORK, TAKE CARE OF THINGS AT HOME, OR GET ALONG WITH OTHER PEOPLE: SOMEWHAT DIFFICULT
7. TROUBLE CONCENTRATING ON THINGS, SUCH AS READING THE NEWSPAPER OR WATCHING TELEVISION: NOT AT ALL
CLINICAL INTERPRETATION OF PHQ9 SCORE: MODERATE DEPRESSION
9. THOUGHTS THAT YOU WOULD BE BETTER OFF DEAD, OR OF HURTING YOURSELF: NOT AT ALL
8. MOVING OR SPEAKING SO SLOWLY THAT OTHER PEOPLE COULD HAVE NOTICED. OR THE OPPOSITE, BEING SO FIGETY OR RESTLESS THAT YOU HAVE BEEN MOVING AROUND A LOT MORE THAN USUAL: SEVERAL DAYS

## 2025-07-10 ENCOUNTER — IMAGING SERVICES (OUTPATIENT)
Dept: GENERAL RADIOLOGY | Age: 60
End: 2025-07-10
Attending: SURGERY

## 2025-07-10 ENCOUNTER — APPOINTMENT (OUTPATIENT)
Dept: SURGERY | Age: 60
End: 2025-07-10

## 2025-07-10 VITALS — HEIGHT: 66 IN | WEIGHT: 129 LBS | BODY MASS INDEX: 20.73 KG/M2 | TEMPERATURE: 96.6 F

## 2025-07-10 DIAGNOSIS — R14.0 BLOATING: ICD-10-CM

## 2025-07-10 DIAGNOSIS — K42.0 UMBILICAL HERNIA WITH OBSTRUCTION, WITHOUT GANGRENE: Primary | ICD-10-CM

## 2025-07-10 LAB
FERRITIN SERPL-MCNC: 324 NG/ML (ref 8–252)
FOLATE SERPL-MCNC: 10 NG/ML
IRON SATN MFR SERPL: 18 % (ref 15–45)
IRON SERPL-MCNC: 45 MCG/DL (ref 50–170)
TIBC SERPL-MCNC: 251 MCG/DL (ref 250–450)
VIT B12 SERPL-MCNC: 607 PG/ML (ref 211–911)

## 2025-07-10 PROCEDURE — 74018 RADEX ABDOMEN 1 VIEW: CPT | Performed by: RADIOLOGY

## 2025-07-10 PROCEDURE — 99212 OFFICE O/P EST SF 10 MIN: CPT | Performed by: SURGERY

## 2025-07-11 ENCOUNTER — APPOINTMENT (OUTPATIENT)
Dept: PHYSICAL THERAPY | Age: 60
End: 2025-07-11
Attending: INTERNAL MEDICINE

## 2025-07-11 DIAGNOSIS — M54.50 ACUTE BILATERAL LOW BACK PAIN WITHOUT SCIATICA: ICD-10-CM

## 2025-07-11 DIAGNOSIS — M62.81 MUSCLE WEAKNESS: Primary | ICD-10-CM

## 2025-07-11 DIAGNOSIS — M54.6 ACUTE MIDLINE THORACIC BACK PAIN: ICD-10-CM

## 2025-07-11 DIAGNOSIS — M54.2 NECK PAIN ON LEFT SIDE: ICD-10-CM

## 2025-07-25 ENCOUNTER — APPOINTMENT (OUTPATIENT)
Dept: INTERNAL MEDICINE | Age: 60
End: 2025-07-25

## 2025-07-25 ENCOUNTER — APPOINTMENT (OUTPATIENT)
Dept: PHYSICAL THERAPY | Age: 60
End: 2025-07-25
Attending: INTERNAL MEDICINE

## 2025-07-25 DIAGNOSIS — M54.6 ACUTE MIDLINE THORACIC BACK PAIN: ICD-10-CM

## 2025-07-25 DIAGNOSIS — M62.81 MUSCLE WEAKNESS: Primary | ICD-10-CM

## 2025-07-25 DIAGNOSIS — M54.50 ACUTE BILATERAL LOW BACK PAIN WITHOUT SCIATICA: ICD-10-CM

## 2025-07-25 DIAGNOSIS — M54.2 NECK PAIN ON LEFT SIDE: ICD-10-CM

## 2025-07-28 ENCOUNTER — APPOINTMENT (OUTPATIENT)
Dept: FAMILY MEDICINE | Age: 60
End: 2025-07-28
Attending: PHYSICAL MEDICINE & REHABILITATION

## 2025-07-28 VITALS — HEIGHT: 66 IN | BODY MASS INDEX: 21.29 KG/M2 | WEIGHT: 132.5 LBS

## 2025-07-28 DIAGNOSIS — M99.01 SOMATIC DYSFUNCTION OF CERVICAL REGION: Primary | ICD-10-CM

## 2025-07-28 DIAGNOSIS — M99.02 SOMATIC DYSFUNCTION OF THORACIC REGION: ICD-10-CM

## 2025-08-04 ENCOUNTER — APPOINTMENT (OUTPATIENT)
Dept: FAMILY MEDICINE | Age: 60
End: 2025-08-04

## 2025-08-04 DIAGNOSIS — M99.01 SOMATIC DYSFUNCTION OF CERVICAL REGION: Primary | ICD-10-CM

## 2025-08-04 DIAGNOSIS — M99.02 SOMATIC DYSFUNCTION OF THORACIC REGION: ICD-10-CM

## 2025-08-07 ENCOUNTER — APPOINTMENT (OUTPATIENT)
Dept: SURGERY | Age: 60
End: 2025-08-07

## 2025-08-07 ENCOUNTER — IMAGING SERVICES (OUTPATIENT)
Dept: GENERAL RADIOLOGY | Age: 60
End: 2025-08-07
Attending: SURGERY

## 2025-08-07 VITALS — WEIGHT: 128 LBS | TEMPERATURE: 96.4 F | BODY MASS INDEX: 20.57 KG/M2 | HEIGHT: 66 IN

## 2025-08-07 DIAGNOSIS — K42.0 UMBILICAL HERNIA WITH OBSTRUCTION, WITHOUT GANGRENE: Primary | ICD-10-CM

## 2025-08-07 DIAGNOSIS — K42.0 UMBILICAL HERNIA WITH OBSTRUCTION, WITHOUT GANGRENE: ICD-10-CM

## 2025-08-07 DIAGNOSIS — K43.6 VENTRAL HERNIA WITH OBSTRUCTION AND WITHOUT GANGRENE: ICD-10-CM

## 2025-08-07 PROCEDURE — 74018 RADEX ABDOMEN 1 VIEW: CPT | Performed by: RADIOLOGY

## 2025-08-07 PROCEDURE — 99212 OFFICE O/P EST SF 10 MIN: CPT | Performed by: SURGERY

## 2025-08-08 ENCOUNTER — APPOINTMENT (OUTPATIENT)
Dept: PHYSICAL THERAPY | Age: 60
End: 2025-08-08
Attending: INTERNAL MEDICINE

## 2025-08-08 DIAGNOSIS — M54.2 NECK PAIN ON LEFT SIDE: ICD-10-CM

## 2025-08-08 DIAGNOSIS — M54.6 ACUTE MIDLINE THORACIC BACK PAIN: ICD-10-CM

## 2025-08-08 DIAGNOSIS — M62.81 MUSCLE WEAKNESS: Primary | ICD-10-CM

## 2025-08-08 DIAGNOSIS — M54.50 ACUTE BILATERAL LOW BACK PAIN WITHOUT SCIATICA: ICD-10-CM

## 2025-08-09 DIAGNOSIS — G43.009 MIGRAINE WITHOUT AURA AND WITHOUT STATUS MIGRAINOSUS, NOT INTRACTABLE: ICD-10-CM

## 2025-08-10 DIAGNOSIS — Z51.81 ENCOUNTER FOR THERAPEUTIC DRUG MONITORING: ICD-10-CM

## 2025-08-10 DIAGNOSIS — I10 ESSENTIAL HYPERTENSION: ICD-10-CM

## 2025-08-10 DIAGNOSIS — E78.00 HYPERCHOLESTEROLEMIA: ICD-10-CM

## 2025-08-10 DIAGNOSIS — Z86.39 HISTORY OF MORBID OBESITY: ICD-10-CM

## 2025-08-10 DIAGNOSIS — E66.3 OVERWEIGHT (BMI 25.0-29.9): ICD-10-CM

## 2025-08-11 ENCOUNTER — APPOINTMENT (OUTPATIENT)
Dept: INTERNAL MEDICINE | Age: 60
End: 2025-08-11

## 2025-08-11 RX ORDER — BUTALBITAL, ACETAMINOPHEN AND CAFFEINE 50; 325; 40 MG/1; MG/1; MG/1
1 TABLET ORAL EVERY 6 HOURS PRN
Qty: 30 TABLET | Refills: 0 | Status: SHIPPED | OUTPATIENT
Start: 2025-08-11

## 2025-08-13 DIAGNOSIS — I10 ESSENTIAL HYPERTENSION: ICD-10-CM

## 2025-08-13 DIAGNOSIS — Z51.81 ENCOUNTER FOR THERAPEUTIC DRUG MONITORING: ICD-10-CM

## 2025-08-13 DIAGNOSIS — E78.00 HYPERCHOLESTEROLEMIA: ICD-10-CM

## 2025-08-13 DIAGNOSIS — Z86.39 HISTORY OF MORBID OBESITY: ICD-10-CM

## 2025-08-13 DIAGNOSIS — E66.3 OVERWEIGHT (BMI 25.0-29.9): ICD-10-CM

## 2025-08-13 RX ORDER — SEMAGLUTIDE 1.7 MG/.75ML
1.7 INJECTION, SOLUTION SUBCUTANEOUS WEEKLY
Qty: 9 ML | Refills: 0 | Status: SHIPPED | OUTPATIENT
Start: 2025-08-13

## 2025-08-14 ENCOUNTER — PATIENT MESSAGE (OUTPATIENT)
Dept: INTERNAL MEDICINE CLINIC | Facility: CLINIC | Age: 60
End: 2025-08-14

## 2025-08-14 RX ORDER — SEMAGLUTIDE 1.7 MG/.75ML
1.7 INJECTION, SOLUTION SUBCUTANEOUS WEEKLY
Qty: 9 ML | Refills: 0 | OUTPATIENT
Start: 2025-08-14

## 2025-08-17 SDOH — ECONOMIC STABILITY: HOUSING INSECURITY: DO YOU HAVE PROBLEMS WITH ANY OF THE FOLLOWING?: NONE OF THE ABOVE

## 2025-08-17 SDOH — ECONOMIC STABILITY: HOUSING INSECURITY: WHAT IS YOUR LIVING SITUATION TODAY?: I HAVE A STEADY PLACE TO LIVE

## 2025-08-17 SDOH — ECONOMIC STABILITY: FOOD INSECURITY: WITHIN THE PAST 12 MONTHS, THE FOOD YOU BOUGHT JUST DIDN'T LAST AND YOU DIDN'T HAVE MONEY TO GET MORE.: NEVER TRUE

## 2025-08-17 ASSESSMENT — SOCIAL DETERMINANTS OF HEALTH (SDOH): IN THE PAST 12 MONTHS, HAS THE ELECTRIC, GAS, OIL, OR WATER COMPANY THREATENED TO SHUT OFF SERVICE IN YOUR HOME?: NO

## 2025-08-18 ENCOUNTER — APPOINTMENT (OUTPATIENT)
Dept: FAMILY MEDICINE | Age: 60
End: 2025-08-18

## 2025-08-18 DIAGNOSIS — M99.01 SOMATIC DYSFUNCTION OF CERVICAL REGION: Primary | ICD-10-CM

## 2025-08-18 DIAGNOSIS — M99.02 SOMATIC DYSFUNCTION OF THORACIC REGION: ICD-10-CM

## 2025-08-19 ENCOUNTER — OFFICE VISIT (OUTPATIENT)
Dept: INTERNAL MEDICINE | Age: 60
End: 2025-08-19

## 2025-08-19 ENCOUNTER — APPOINTMENT (OUTPATIENT)
Dept: INTERNAL MEDICINE | Age: 60
End: 2025-08-19

## 2025-08-19 VITALS
SYSTOLIC BLOOD PRESSURE: 96 MMHG | HEART RATE: 74 BPM | DIASTOLIC BLOOD PRESSURE: 60 MMHG | WEIGHT: 129 LBS | HEIGHT: 66 IN | BODY MASS INDEX: 20.73 KG/M2 | TEMPERATURE: 97.1 F | OXYGEN SATURATION: 99 % | RESPIRATION RATE: 18 BRPM

## 2025-08-19 DIAGNOSIS — F32.89 OTHER DEPRESSION: ICD-10-CM

## 2025-08-19 DIAGNOSIS — N95.2 ATROPHIC VAGINITIS: Primary | ICD-10-CM

## 2025-08-19 PROCEDURE — 99214 OFFICE O/P EST MOD 30 MIN: CPT | Performed by: INTERNAL MEDICINE

## 2025-08-19 RX ORDER — BUPROPION HYDROCHLORIDE 150 MG/1
150 TABLET ORAL DAILY
Qty: 90 TABLET | Refills: 1 | Status: SHIPPED | OUTPATIENT
Start: 2025-08-19

## 2025-08-19 RX ORDER — ESTRADIOL 0.1 MG/G
CREAM VAGINAL
Qty: 42.5 G | Refills: 1 | Status: SHIPPED | OUTPATIENT
Start: 2025-08-19

## 2025-08-19 ASSESSMENT — PATIENT HEALTH QUESTIONNAIRE - PHQ9
1. LITTLE INTEREST OR PLEASURE IN DOING THINGS: NOT AT ALL
2. FEELING DOWN, DEPRESSED OR HOPELESS: NOT AT ALL
SUM OF ALL RESPONSES TO PHQ9 QUESTIONS 1 AND 2: 0
SUM OF ALL RESPONSES TO PHQ9 QUESTIONS 1 AND 2: 0
CLINICAL INTERPRETATION OF PHQ2 SCORE: NO FURTHER SCREENING NEEDED

## 2025-08-24 DIAGNOSIS — M54.2 NECK PAIN ON RIGHT SIDE: ICD-10-CM

## 2025-08-24 DIAGNOSIS — M54.12 CERVICAL RADICULOPATHY: ICD-10-CM

## 2025-08-25 ENCOUNTER — APPOINTMENT (OUTPATIENT)
Dept: FAMILY MEDICINE | Age: 60
End: 2025-08-25

## 2025-08-25 DIAGNOSIS — M99.01 SOMATIC DYSFUNCTION OF CERVICAL REGION: Primary | ICD-10-CM

## 2025-08-25 DIAGNOSIS — M99.02 SOMATIC DYSFUNCTION OF THORACIC REGION: ICD-10-CM

## 2025-08-26 ENCOUNTER — APPOINTMENT (OUTPATIENT)
Dept: PHYSICAL THERAPY | Age: 60
End: 2025-08-26
Attending: INTERNAL MEDICINE

## 2025-08-26 DIAGNOSIS — M54.50 ACUTE BILATERAL LOW BACK PAIN WITHOUT SCIATICA: ICD-10-CM

## 2025-08-26 DIAGNOSIS — M54.2 NECK PAIN ON LEFT SIDE: ICD-10-CM

## 2025-08-26 DIAGNOSIS — M62.81 MUSCLE WEAKNESS: Primary | ICD-10-CM

## 2025-08-26 DIAGNOSIS — M54.6 ACUTE MIDLINE THORACIC BACK PAIN: ICD-10-CM

## 2025-08-26 RX ORDER — GABAPENTIN 100 MG/1
200 CAPSULE ORAL NIGHTLY
Qty: 180 CAPSULE | Refills: 1 | OUTPATIENT
Start: 2025-08-26

## 2025-09-08 ENCOUNTER — APPOINTMENT (OUTPATIENT)
Dept: SURGERY | Age: 60
End: 2025-09-08

## 2025-09-08 ENCOUNTER — APPOINTMENT (OUTPATIENT)
Dept: FAMILY MEDICINE | Age: 60
End: 2025-09-08

## 2025-09-15 ENCOUNTER — APPOINTMENT (OUTPATIENT)
Dept: PHYSICAL THERAPY | Age: 60
End: 2025-09-15
Attending: INTERNAL MEDICINE

## 2025-09-15 DIAGNOSIS — M62.81 MUSCLE WEAKNESS: Primary | ICD-10-CM

## 2025-09-15 DIAGNOSIS — M54.6 ACUTE MIDLINE THORACIC BACK PAIN: ICD-10-CM

## 2025-09-15 DIAGNOSIS — M54.2 NECK PAIN ON LEFT SIDE: ICD-10-CM

## 2025-09-15 DIAGNOSIS — M54.50 ACUTE BILATERAL LOW BACK PAIN WITHOUT SCIATICA: ICD-10-CM

## 2025-09-16 ENCOUNTER — APPOINTMENT (OUTPATIENT)
Dept: INTERNAL MEDICINE | Age: 60
End: 2025-09-16

## 2025-09-22 ENCOUNTER — APPOINTMENT (OUTPATIENT)
Dept: ULTRASOUND IMAGING | Age: 60
End: 2025-09-22
Attending: INTERNAL MEDICINE

## 2025-09-22 ENCOUNTER — APPOINTMENT (OUTPATIENT)
Dept: PHYSICAL THERAPY | Age: 60
End: 2025-09-22
Attending: INTERNAL MEDICINE

## 2025-09-22 DIAGNOSIS — M54.50 ACUTE BILATERAL LOW BACK PAIN WITHOUT SCIATICA: ICD-10-CM

## 2025-09-22 DIAGNOSIS — M62.81 MUSCLE WEAKNESS: Primary | ICD-10-CM

## 2025-09-22 DIAGNOSIS — M54.2 NECK PAIN ON LEFT SIDE: ICD-10-CM

## 2025-09-22 DIAGNOSIS — M54.6 ACUTE MIDLINE THORACIC BACK PAIN: ICD-10-CM

## 2026-02-02 ENCOUNTER — APPOINTMENT (OUTPATIENT)
Dept: INTERNAL MEDICINE | Age: 61
End: 2026-02-02

## 2026-03-02 ENCOUNTER — APPOINTMENT (OUTPATIENT)
Dept: CARDIOLOGY | Age: 61
End: 2026-03-02

## (undated) DEVICE — UNDYED BRAIDED (POLYGLACTIN 910), SYNTHETIC ABSORBABLE SUTURE: Brand: COATED VICRYL

## (undated) DEVICE — LAP CHOLE: Brand: MEDLINE INDUSTRIES, INC.

## (undated) DEVICE — GAMMEX® PI HYBRID SIZE 7, STERILE POWDER-FREE SURGICAL GLOVE, POLYISOPRENE AND NEOPRENE BLEND: Brand: GAMMEX

## (undated) DEVICE — DRAPE SHEET LG

## (undated) DEVICE — ENCORE® LATEX MICRO SIZE 7.5, STERILE LATEX POWDER-FREE SURGICAL GLOVE: Brand: ENCORE

## (undated) DEVICE — TROCAR: Brand: KII FIOS FIRST ENTRY

## (undated) DEVICE — SUTURE PASSOR WITH GUIDE

## (undated) DEVICE — DERMABOND LIQUID ADHESIVE

## (undated) DEVICE — VISIGI 3D®  CALIBRATION SYSTEM  SIZE 40FR STD W/ BULB: Brand: BOEHRINGER® VISIGI 3D™ SLEEVE GASTRECTOMY CALIBRATION SYSTEM, SIZE 40FR W/BULB

## (undated) DEVICE — LIGASURE LAP MARYLAND 44CM

## (undated) DEVICE — HOVERMATT 34IN SINGLE USE

## (undated) DEVICE — SUTURING DEVICE: Brand: ENDO STITCH

## (undated) DEVICE — DRAPE SHEET LAPCHOLE 124X100X7

## (undated) DEVICE — ENDOSTITCH SURGIDAC 0

## (undated) DEVICE — TROCAR: Brand: KII® SLEEVE

## (undated) DEVICE — SOL H2O 1000ML BTL

## (undated) DEVICE — SEAM GUARD BLACK

## (undated) DEVICE — 3M™ STERI-DRAPE™ INSTRUMENT POUCH 1018L: Brand: STERI-DRAPE™

## (undated) DEVICE — SEAM GUARD PURPLE

## (undated) DEVICE — [HIGH FLOW INSUFFLATOR,  DO NOT USE IF PACKAGE IS DAMAGED,  KEEP DRY,  KEEP AWAY FROM SUNLIGHT,  PROTECT FROM HEAT AND RADIOACTIVE SOURCES.]: Brand: PNEUMOSURE

## (undated) DEVICE — SUTURE PDS II 1 CT-1

## (undated) DEVICE — Device: Brand: JELCO

## (undated) DEVICE — TROCARS: Brand: KII® OPTICAL ACCESS SYSTEM

## (undated) DEVICE — PROXIMATE SKIN STAPLERS (35 WIDE) CONTAINS 35 STAINLESS STEEL STAPLES (FIXED HEAD): Brand: PROXIMATE

## (undated) DEVICE — SOL  .9 3000ML

## (undated) DEVICE — KIT SLEEVE BARIATRIC BYPASS

## (undated) NOTE — MR AVS SNAPSHOT
[address]  [phone]               Thank you for choosing us for your health care visit with Williams Da Silva MD.  We are glad to serve you and happy to provide you with this summary of Take 1 tablet (100 mg total) by mouth 2 (two) times daily. Commonly known as:  Topamax                 MyChart    Visit MyChart  You can access your MyChart to more actively manage your health care and view more details from this visit by going to https://

## (undated) NOTE — LETTER
[facility] [address], [address], [facility] [address]  [facility]  [phone]            January 13, 2022      [facility] [address] [place] [id number]      Dear Ms. Timmy Macias

## (undated) NOTE — Clinical Note
Thank you for referring Caprice Griffin to the [facility]. I met with her in consultation today. I have ordered labs, set up a nutrition consultation with our dietician.   She is in the decision process of switching to our program from current [facility]

## (undated) NOTE — LETTER
[address], [address] [facility], [address] [place] [address]            June 26, 2019      [address]  [address] [id number]      Dear Ms. [employer]